# Patient Record
Sex: MALE | Race: BLACK OR AFRICAN AMERICAN | NOT HISPANIC OR LATINO | Employment: OTHER | URBAN - METROPOLITAN AREA
[De-identification: names, ages, dates, MRNs, and addresses within clinical notes are randomized per-mention and may not be internally consistent; named-entity substitution may affect disease eponyms.]

---

## 2018-12-13 ENCOUNTER — APPOINTMENT (OUTPATIENT)
Dept: LAB | Facility: HOSPITAL | Age: 68
End: 2018-12-13
Payer: COMMERCIAL

## 2018-12-13 ENCOUNTER — TELEPHONE (OUTPATIENT)
Dept: INTERNAL MEDICINE CLINIC | Facility: CLINIC | Age: 68
End: 2018-12-13

## 2018-12-13 ENCOUNTER — OFFICE VISIT (OUTPATIENT)
Dept: INTERNAL MEDICINE CLINIC | Facility: CLINIC | Age: 68
End: 2018-12-13
Payer: COMMERCIAL

## 2018-12-13 VITALS
TEMPERATURE: 97.7 F | HEIGHT: 65 IN | SYSTOLIC BLOOD PRESSURE: 130 MMHG | DIASTOLIC BLOOD PRESSURE: 100 MMHG | BODY MASS INDEX: 30.19 KG/M2 | WEIGHT: 181.22 LBS | HEART RATE: 80 BPM

## 2018-12-13 DIAGNOSIS — Z12.5 PROSTATE CANCER SCREENING: ICD-10-CM

## 2018-12-13 DIAGNOSIS — R03.0 ELEVATED BLOOD PRESSURE READING IN OFFICE WITHOUT DIAGNOSIS OF HYPERTENSION: ICD-10-CM

## 2018-12-13 DIAGNOSIS — R10.11 RIGHT UPPER QUADRANT PAIN: Primary | ICD-10-CM

## 2018-12-13 DIAGNOSIS — R73.01 ELEVATED FASTING GLUCOSE: Primary | ICD-10-CM

## 2018-12-13 DIAGNOSIS — E78.5 BORDERLINE HYPERLIPIDEMIA: ICD-10-CM

## 2018-12-13 DIAGNOSIS — E66.09 CLASS 1 OBESITY DUE TO EXCESS CALORIES WITHOUT SERIOUS COMORBIDITY WITH BODY MASS INDEX (BMI) OF 30.0 TO 30.9 IN ADULT: ICD-10-CM

## 2018-12-13 PROBLEM — E66.811 CLASS 1 OBESITY DUE TO EXCESS CALORIES WITHOUT SERIOUS COMORBIDITY WITH BODY MASS INDEX (BMI) OF 30.0 TO 30.9 IN ADULT: Status: ACTIVE | Noted: 2018-12-13

## 2018-12-13 LAB
ALBUMIN SERPL BCP-MCNC: 4 G/DL (ref 3.5–5)
ALP SERPL-CCNC: 50 U/L (ref 46–116)
ALT SERPL W P-5'-P-CCNC: 26 U/L (ref 12–78)
ANION GAP SERPL CALCULATED.3IONS-SCNC: 5 MMOL/L (ref 4–13)
AST SERPL W P-5'-P-CCNC: 19 U/L (ref 5–45)
BILIRUB SERPL-MCNC: 0.73 MG/DL (ref 0.2–1)
BUN SERPL-MCNC: 12 MG/DL (ref 5–25)
CALCIUM SERPL-MCNC: 9.8 MG/DL (ref 8.3–10.1)
CHLORIDE SERPL-SCNC: 104 MMOL/L (ref 100–108)
CHOLEST SERPL-MCNC: 196 MG/DL (ref 50–200)
CO2 SERPL-SCNC: 29 MMOL/L (ref 21–32)
CREAT SERPL-MCNC: 1.12 MG/DL (ref 0.6–1.3)
GFR SERPL CREATININE-BSD FRML MDRD: 79 ML/MIN/1.73SQ M
GLUCOSE P FAST SERPL-MCNC: 105 MG/DL (ref 65–99)
HDLC SERPL-MCNC: 40 MG/DL (ref 40–60)
LDLC SERPL CALC-MCNC: 126 MG/DL (ref 0–100)
POTASSIUM SERPL-SCNC: 4.4 MMOL/L (ref 3.5–5.3)
PROT SERPL-MCNC: 8.5 G/DL (ref 6.4–8.2)
PSA SERPL-MCNC: 2.2 NG/ML (ref 0–4)
SODIUM SERPL-SCNC: 138 MMOL/L (ref 136–145)
TRIGL SERPL-MCNC: 149 MG/DL

## 2018-12-13 PROCEDURE — 80053 COMPREHEN METABOLIC PANEL: CPT | Performed by: PHYSICIAN ASSISTANT

## 2018-12-13 PROCEDURE — 99204 OFFICE O/P NEW MOD 45 MIN: CPT | Performed by: PHYSICIAN ASSISTANT

## 2018-12-13 PROCEDURE — 36415 COLL VENOUS BLD VENIPUNCTURE: CPT | Performed by: PHYSICIAN ASSISTANT

## 2018-12-13 PROCEDURE — 80061 LIPID PANEL: CPT | Performed by: PHYSICIAN ASSISTANT

## 2018-12-13 PROCEDURE — G0103 PSA SCREENING: HCPCS

## 2018-12-13 NOTE — PATIENT INSTRUCTIONS
Get the labs completed  Sched the ultrasound for pain Right side  As we discussed today there is a possibility that this recurrent pain could be related to gallbladder issues  The ultrasound will determine if this is a concern  As discussed limit all caffeine and salty foods and Blood pressure slightly high in the office today  Will re evaluate when return for testing results    As discussed you have declined all vaccines  Heart Healthy Diet   AMBULATORY CARE:   A heart healthy diet  is an eating plan low in total fat, unhealthy fats, and sodium (salt)  A heart healthy diet helps decrease your risk for heart disease and stroke  Limit the amount of fat you eat to 25% to 35% of your total daily calories  Limit sodium to less than 2,300 mg each day  Healthy fats:  Healthy fats can help improve cholesterol levels  The risk for heart disease is decreased when cholesterol levels are normal  Choose healthy fats, such as the following:  · Unsaturated fat  is found in foods such as soybean, canola, olive, corn, and safflower oils  It is also found in soft tub margarine that is made with liquid vegetable oil  · Omega-3 fat  is found in certain fish, such as salmon, tuna, and trout, and in walnuts and flaxseed  Unhealthy fats:  Unhealthy fats can cause unhealthy cholesterol levels in your blood and increase your risk of heart disease  Limit unhealthy fats, such as the following:  · Cholesterol  is found in animal foods, such as eggs and lobster, and in dairy products made from whole milk  Limit cholesterol to less than 300 milligrams (mg) each day  You may need to limit cholesterol to 200 mg each day if you have heart disease  · Saturated fat  is found in meats, such as becerril and hamburger  It is also found in chicken or turkey skin, whole milk, and butter  Limit saturated fat to less than 7% of your total daily calories   Limit saturated fat to less than 6% if you have heart disease or are at increased risk for it  · Trans fat  is found in packaged foods, such as potato chips and cookies  It is also in hard margarine, some fried foods, and shortening  Avoid trans fats as much as possible    Heart healthy foods and drinks to include:  Ask your dietitian or healthcare provider how many servings to have from each of the following food groups:  · Grains:      ¨ Whole-wheat breads, cereals, and pastas, and brown rice    ¨ Low-fat, low-sodium crackers and chips    · Vegetables:      ¨ Broccoli, green beans, green peas, and spinach    ¨ Collards, kale, and lima beans    ¨ Carrots, sweet potatoes, tomatoes, and peppers    ¨ Canned vegetables with no salt added    · Fruits:      ¨ Bananas, peaches, pears, and pineapple    ¨ Grapes, raisins, and dates    ¨ Oranges, tangerines, grapefruit, orange juice, and grapefruit juice    ¨ Apricots, mangoes, melons, and papaya    ¨ Raspberries and strawberries    ¨ Canned fruit with no added sugar    · Low-fat dairy products:      ¨ Nonfat (skim) milk, 1% milk, and low-fat almond, cashew, or soy milks fortified with calcium    ¨ Low-fat cheese, regular or frozen yogurt, and cottage cheese    · Meats and proteins , such as lean cuts of beef and pork (loin, leg, round), skinless chicken and turkey, legumes, soy products, egg whites, and nuts  Foods and drinks to limit or avoid:  Ask your dietitian or healthcare provider about these and other foods that are high in unhealthy fat, sodium, and sugar:  · Snack or packaged foods , such as frozen dinners, cookies, macaroni and cheese, and cereals with more than 300 mg of sodium per serving    · Canned or dry mixes  for cakes, soups, sauces, or gravies    · Vegetables with added sodium , such as instant potatoes, vegetables with added sauces, or regular canned vegetables    · Other foods high in sodium , such as ketchup, barbecue sauce, salad dressing, pickles, olives, soy sauce, and miso    · High-fat dairy foods  such as whole or 2% milk, cream cheese, or sour cream, and cheeses     · High-fat protein foods  such as high-fat cuts of beef (T-bone steaks, ribs), chicken or turkey with skin, and organ meats, such as liver    · Cured or smoked meats , such as hot dogs, becerril, and sausage    · Unhealthy fats and oils , such as butter, stick margarine, shortening, and cooking oils such as coconut or palm oil    · Food and drinks high in sugar , such as soft drinks (soda), sports drinks, sweetened tea, candy, cake, cookies, pies, and doughnuts  Other diet guidelines to follow:   · Eat more foods containing omega-3 fats  Eat fish high in omega-3 fats at least 2 times a week  · Limit alcohol  Too much alcohol can damage your heart and raise your blood pressure  Women should limit alcohol to 1 drink a day  Men should limit alcohol to 2 drinks a day  A drink of alcohol is 12 ounces of beer, 5 ounces of wine, or 1½ ounces of liquor  · Choose low-sodium foods  High-sodium foods can lead to high blood pressure  Add little or no salt to food you prepare  Use herbs and spices in place of salt  · Eat more fiber  to help lower cholesterol levels  Eat at least 5 servings of fruits and vegetables each day  Eat 3 ounces of whole-grain foods each day  Legumes (beans) are also a good source of fiber  Lifestyle guidelines:   · Do not smoke  Nicotine and other chemicals in cigarettes and cigars can cause lung and heart damage  Ask your healthcare provider for information if you currently smoke and need help to quit  E-cigarettes or smokeless tobacco still contain nicotine  Talk to your healthcare provider before you use these products  · Exercise regularly  to help you maintain a healthy weight and improve your blood pressure and cholesterol levels  Ask your healthcare provider about the best exercise plan for you  Do not start an exercise program without asking your healthcare provider     Follow up with your healthcare provider as directed:  Write down your questions so you remember to ask them during your visits  © 2017 [employer] Information is for End User's use only and may not be sold, redistributed or otherwise used for commercial purposes  All illustrations and images included in CareNotes® are the copyrighted property of [employer]  or Nir [employer]  The above information is an  only  It is not intended as medical advice for individual conditions or treatments  Talk to your doctor, nurse or pharmacist before following any medical regimen to see if it is safe and effective for you

## 2018-12-13 NOTE — PROGRESS NOTES
Assessment/Plan:    No problem-specific Assessment & Plan notes found for this encounter  Diagnoses and all orders for this visit:    Right upper quadrant pain  -     US right upper quadrant; Future    Elevated blood pressure reading in office without diagnosis of hypertension    Class 1 obesity due to excess calories without serious comorbidity with body mass index (BMI) of 30 0 to 30 9 in adult  -     Comprehensive metabolic panel  -     Lipid Panel with Direct LDL reflex    Borderline hyperlipidemia  -     Comprehensive metabolic panel  -     Lipid Panel with Direct LDL reflex    Prostate cancer screening  -     PSA, Total Screen; Future          Subjective:      Patient ID: Israel Kwan is a 79 y o  male  Pt here as new patient  Reports no medical care in about 14 years  Last was for muscle pain from lifting heavy boxes thinks  was 2003  Pt denies any prior / known chronic medical conditions    Positive FH 2 siblings with DM    Never a smoker, no drug or alcohol  Never had colonoscopy   No FH colon or prostate cancer    C/O RUQ pain on off past 3 months will get about 3x a week  Will last about 5 minutes  Then goes away  Denies any N/V  No constipation or diarrhea    States sometime if stretches helps the pain  Has not noticed if associated with eating or not eating  Describes as cramp not stabbing  Sometimes arthritic pain to fingers knees has always done physical work and played sports not limiting ADL    Discussed slightly elevated diastolic blood pressure reading  Patient states he has checked his blood pressure in the stores and he reports the 1st number ranges 120s to 130s and states that his bottom number is typically 80s  He denies ever having any readings above 90    Patient declines all vaccines  When asked why he states that he rarely gets sick and if he does he gets well very quickly    Discussed that while that is very good for him we want to make sure that we prevent any chronic conditions or complications however patient declines vaccines  The following portions of the patient's history were reviewed and updated as appropriate: allergies, current medications, past family history, past medical history, past social history, past surgical history and problem list     Review of Systems   Constitutional: Negative  Negative for chills, fatigue and unexpected weight change  HENT: Negative  No dental care in decades, denies any pain   Eyes: Negative  Reading glasses last eye exam never   Respiratory: Negative  Negative for cough, chest tightness and shortness of breath  Cardiovascular: Negative  Negative for chest pain and palpitations  Gastrointestinal: Positive for abdominal pain  Negative for abdominal distention, constipation, diarrhea, nausea and vomiting  Patient reporting pain RUQ on/ off past 3 months   Endocrine: Negative for cold intolerance and heat intolerance  Genitourinary: Negative  Negative for dysuria, frequency and urgency  No nocturia   Musculoskeletal: Positive for arthralgias  Some generalized arthritis  Denies trauma or injury   Skin: Negative  Negative for rash  Allergic/Immunologic: Negative  Neurological: Negative  Negative for dizziness, seizures and weakness  Psychiatric/Behavioral: Negative  Objective:      /100 (BP Location: Left arm, Patient Position: Sitting, Cuff Size: Standard)   Pulse 80   Temp 97 7 °F (36 5 °C) (Oral)   Ht 5' 4 5" (1 638 m)   Wt 82 2 kg (181 lb 3 5 oz)   BMI 30 63 kg/m²          Physical Exam   Constitutional: He is oriented to person, place, and time  He appears well-developed and well-nourished  HENT:   Head: Normocephalic and atraumatic  Fair dentition   Eyes: Pupils are equal, round, and reactive to light  Conjunctivae are normal    Neck: Normal range of motion  Neck supple  Carotid bruit is not present  No thyromegaly present     Cardiovascular: Normal rate and regular rhythm  No murmur heard  Pulmonary/Chest: Effort normal and breath sounds normal  He has no wheezes  Abdominal: Soft  Bowel sounds are normal  He exhibits no distension  There is no tenderness  There is no rigidity, no rebound, no guarding and negative Weathers's sign  No hernia  Diastasis recti   Musculoskeletal: Normal range of motion  He exhibits no edema or tenderness  Neurological: He is alert and oriented to person, place, and time  Skin: Skin is warm and dry  No rash noted  Generalized dry skin   Psychiatric: He has a normal mood and affect  His behavior is normal      BMI Counseling: Body mass index is 30 63 kg/m²  Discussed the patient's BMI with him  The BMI is above average  BMI counseling and education was provided to the patient  Nutrition recommendations include decreasing overall calorie intake, 3-5 servings of fruits/vegetables daily, reducing fast food intake, decreasing soda and/or juice intake, moderation in carbohydrate intake, increasing intake of lean protein and reducing intake of cholesterol

## 2018-12-19 ENCOUNTER — HOSPITAL ENCOUNTER (OUTPATIENT)
Dept: RADIOLOGY | Facility: HOSPITAL | Age: 68
Discharge: HOME | End: 2018-12-19
Payer: COMMERCIAL

## 2018-12-19 DIAGNOSIS — R10.11 RIGHT UPPER QUADRANT PAIN: ICD-10-CM

## 2018-12-19 PROCEDURE — 76705 ECHO EXAM OF ABDOMEN: CPT

## 2019-01-15 NOTE — PROGRESS NOTES
OVERDUE RESULTS REMINDER  Left message for patient reminding him to get labs done ordered in December by Alvaro Kaminski and to call if has questions       HEMOGLOBIN A1C; GLUCOSE, FASTING

## 2019-04-16 ENCOUNTER — TELEPHONE (OUTPATIENT)
Dept: INTERNAL MEDICINE CLINIC | Facility: CLINIC | Age: 69
End: 2019-04-16

## 2019-04-16 ENCOUNTER — APPOINTMENT (OUTPATIENT)
Dept: LAB | Facility: CLINIC | Age: 69
End: 2019-04-16
Payer: COMMERCIAL

## 2019-04-16 DIAGNOSIS — R73.01 ELEVATED FASTING GLUCOSE: ICD-10-CM

## 2019-04-16 LAB
EST. AVERAGE GLUCOSE BLD GHB EST-MCNC: 177 MG/DL
GLUCOSE P FAST SERPL-MCNC: 110 MG/DL (ref 65–99)
HBA1C MFR BLD: 7.8 % (ref 4.2–6.3)

## 2019-04-16 PROCEDURE — 82947 ASSAY GLUCOSE BLOOD QUANT: CPT

## 2019-04-16 PROCEDURE — 36415 COLL VENOUS BLD VENIPUNCTURE: CPT

## 2019-04-16 PROCEDURE — 83036 HEMOGLOBIN GLYCOSYLATED A1C: CPT

## 2019-04-17 ENCOUNTER — OFFICE VISIT (OUTPATIENT)
Dept: INTERNAL MEDICINE CLINIC | Facility: CLINIC | Age: 69
End: 2019-04-17

## 2019-04-17 ENCOUNTER — OFFICE VISIT (OUTPATIENT)
Dept: LAB | Facility: HOSPITAL | Age: 69
End: 2019-04-17
Payer: COMMERCIAL

## 2019-04-17 ENCOUNTER — APPOINTMENT (OUTPATIENT)
Dept: LAB | Facility: HOSPITAL | Age: 69
End: 2019-04-17
Payer: COMMERCIAL

## 2019-04-17 VITALS
HEART RATE: 60 BPM | TEMPERATURE: 97.3 F | WEIGHT: 181.22 LBS | SYSTOLIC BLOOD PRESSURE: 146 MMHG | HEIGHT: 64 IN | BODY MASS INDEX: 30.94 KG/M2 | DIASTOLIC BLOOD PRESSURE: 84 MMHG

## 2019-04-17 DIAGNOSIS — Z12.11 SCREENING FOR COLON CANCER: ICD-10-CM

## 2019-04-17 DIAGNOSIS — I10 ESSENTIAL HYPERTENSION: Chronic | ICD-10-CM

## 2019-04-17 DIAGNOSIS — K76.0 HEPATIC STEATOSIS: Chronic | ICD-10-CM

## 2019-04-17 DIAGNOSIS — E66.09 CLASS 1 OBESITY DUE TO EXCESS CALORIES WITHOUT SERIOUS COMORBIDITY WITH BODY MASS INDEX (BMI) OF 30.0 TO 30.9 IN ADULT: ICD-10-CM

## 2019-04-17 DIAGNOSIS — E78.00 ELEVATED LDL CHOLESTEROL LEVEL: Chronic | ICD-10-CM

## 2019-04-17 DIAGNOSIS — E11.65 UNCONTROLLED TYPE 2 DIABETES MELLITUS WITH HYPERGLYCEMIA (HCC): Primary | Chronic | ICD-10-CM

## 2019-04-17 PROBLEM — R10.11 RIGHT UPPER QUADRANT PAIN: Status: RESOLVED | Noted: 2018-12-13 | Resolved: 2019-04-17

## 2019-04-17 PROBLEM — E78.5 BORDERLINE HYPERLIPIDEMIA: Status: RESOLVED | Noted: 2018-12-13 | Resolved: 2019-04-17

## 2019-04-17 PROBLEM — R03.0 ELEVATED BLOOD PRESSURE READING IN OFFICE WITHOUT DIAGNOSIS OF HYPERTENSION: Status: RESOLVED | Noted: 2018-12-13 | Resolved: 2019-04-17

## 2019-04-17 PROBLEM — R73.01 ELEVATED FASTING GLUCOSE: Status: RESOLVED | Noted: 2018-12-13 | Resolved: 2019-04-17

## 2019-04-17 LAB — HEMOCCULT STL QL IA: NEGATIVE

## 2019-04-17 PROCEDURE — 93005 ELECTROCARDIOGRAM TRACING: CPT

## 2019-04-17 PROCEDURE — G0328 FECAL BLOOD SCRN IMMUNOASSAY: HCPCS

## 2019-04-17 PROCEDURE — 1101F PT FALLS ASSESS-DOCD LE1/YR: CPT | Performed by: INTERNAL MEDICINE

## 2019-04-17 PROCEDURE — 99213 OFFICE O/P EST LOW 20 MIN: CPT | Performed by: INTERNAL MEDICINE

## 2019-04-17 RX ORDER — AMLODIPINE BESYLATE 5 MG/1
5 TABLET ORAL DAILY
Qty: 90 TABLET | Refills: 1 | Status: SHIPPED | OUTPATIENT
Start: 2019-04-17 | End: 2019-10-12 | Stop reason: SDUPTHER

## 2019-04-18 ENCOUNTER — TELEPHONE (OUTPATIENT)
Dept: INTERNAL MEDICINE CLINIC | Facility: CLINIC | Age: 69
End: 2019-04-18

## 2019-04-18 DIAGNOSIS — I44.0 FIRST DEGREE AV BLOCK: Primary | Chronic | ICD-10-CM

## 2019-04-18 LAB
ATRIAL RATE: 54 BPM
P AXIS: 36 DEGREES
PR INTERVAL: 276 MS
QRS AXIS: -51 DEGREES
QRSD INTERVAL: 120 MS
QT INTERVAL: 430 MS
QTC INTERVAL: 407 MS
T WAVE AXIS: 44 DEGREES
VENTRICULAR RATE: 54 BPM

## 2019-04-18 PROCEDURE — 93010 ELECTROCARDIOGRAM REPORT: CPT | Performed by: INTERNAL MEDICINE

## 2019-05-07 ENCOUNTER — CONSULT (OUTPATIENT)
Dept: CARDIOLOGY CLINIC | Facility: CLINIC | Age: 69
End: 2019-05-07
Payer: COMMERCIAL

## 2019-05-07 VITALS
BODY MASS INDEX: 30.88 KG/M2 | HEIGHT: 64 IN | SYSTOLIC BLOOD PRESSURE: 134 MMHG | HEART RATE: 68 BPM | DIASTOLIC BLOOD PRESSURE: 88 MMHG | WEIGHT: 180.9 LBS

## 2019-05-07 DIAGNOSIS — R94.31 ABNORMAL EKG: ICD-10-CM

## 2019-05-07 DIAGNOSIS — E78.00 ELEVATED LDL CHOLESTEROL LEVEL: Chronic | ICD-10-CM

## 2019-05-07 DIAGNOSIS — I10 ESSENTIAL HYPERTENSION: Chronic | ICD-10-CM

## 2019-05-07 DIAGNOSIS — I44.0 FIRST DEGREE AV BLOCK: Primary | Chronic | ICD-10-CM

## 2019-05-07 PROCEDURE — 99243 OFF/OP CNSLTJ NEW/EST LOW 30: CPT | Performed by: INTERNAL MEDICINE

## 2019-06-07 ENCOUNTER — HOSPITAL ENCOUNTER (OUTPATIENT)
Dept: NON INVASIVE DIAGNOSTICS | Facility: CLINIC | Age: 69
Discharge: HOME | End: 2019-06-07
Payer: COMMERCIAL

## 2019-06-07 DIAGNOSIS — R94.31 ABNORMAL EKG: ICD-10-CM

## 2019-06-07 DIAGNOSIS — I44.0 FIRST DEGREE AV BLOCK: ICD-10-CM

## 2019-06-07 DIAGNOSIS — I44.0 FIRST DEGREE AV BLOCK: Chronic | ICD-10-CM

## 2019-06-07 LAB
CHEST PAIN STATEMENT: NORMAL
MAX DIASTOLIC BP: 78 MMHG
MAX HEART RATE: 166 BPM
MAX PREDICTED HEART RATE: 153 BPM
MAX. SYSTOLIC BP: 202 MMHG
PROTOCOL NAME: NORMAL
TARGET HR FORMULA: NORMAL
TEST INDICATION: NORMAL
TIME IN EXERCISE PHASE: NORMAL

## 2019-06-07 PROCEDURE — 93225 XTRNL ECG REC<48 HRS REC: CPT

## 2019-06-07 PROCEDURE — 93016 CV STRESS TEST SUPVJ ONLY: CPT | Performed by: INTERNAL MEDICINE

## 2019-06-07 PROCEDURE — 93018 CV STRESS TEST I&R ONLY: CPT | Performed by: INTERNAL MEDICINE

## 2019-06-07 PROCEDURE — 93306 TTE W/DOPPLER COMPLETE: CPT

## 2019-06-07 PROCEDURE — 93306 TTE W/DOPPLER COMPLETE: CPT | Performed by: INTERNAL MEDICINE

## 2019-06-07 PROCEDURE — 93017 CV STRESS TEST TRACING ONLY: CPT

## 2019-06-07 PROCEDURE — 93226 XTRNL ECG REC<48 HR SCAN A/R: CPT

## 2019-06-11 PROCEDURE — 93227 XTRNL ECG REC<48 HR R&I: CPT | Performed by: INTERNAL MEDICINE

## 2019-07-22 ENCOUNTER — OFFICE VISIT (OUTPATIENT)
Dept: INTERNAL MEDICINE CLINIC | Facility: CLINIC | Age: 69
End: 2019-07-22

## 2019-07-22 VITALS
SYSTOLIC BLOOD PRESSURE: 154 MMHG | BODY MASS INDEX: 29.36 KG/M2 | DIASTOLIC BLOOD PRESSURE: 86 MMHG | HEART RATE: 70 BPM | WEIGHT: 171.96 LBS | TEMPERATURE: 98.2 F | HEIGHT: 64 IN

## 2019-07-22 DIAGNOSIS — I44.0 FIRST DEGREE AV BLOCK: Chronic | ICD-10-CM

## 2019-07-22 DIAGNOSIS — B35.3 TINEA PEDIS OF BOTH FEET: Chronic | ICD-10-CM

## 2019-07-22 DIAGNOSIS — Z12.11 SCREENING FOR COLON CANCER: ICD-10-CM

## 2019-07-22 DIAGNOSIS — E11.65 UNCONTROLLED TYPE 2 DIABETES MELLITUS WITH HYPERGLYCEMIA (HCC): Primary | Chronic | ICD-10-CM

## 2019-07-22 DIAGNOSIS — I10 ESSENTIAL HYPERTENSION: Chronic | ICD-10-CM

## 2019-07-22 PROBLEM — R94.31 ABNORMAL EKG: Status: RESOLVED | Noted: 2019-05-07 | Resolved: 2019-07-22

## 2019-07-22 PROCEDURE — 99213 OFFICE O/P EST LOW 20 MIN: CPT | Performed by: PHYSICIAN ASSISTANT

## 2019-07-22 RX ORDER — KETOCONAZOLE 20 MG/G
CREAM TOPICAL DAILY
Qty: 15 G | Refills: 2 | Status: SHIPPED | OUTPATIENT
Start: 2019-07-22 | End: 2020-06-08 | Stop reason: SDUPTHER

## 2019-07-22 NOTE — PROGRESS NOTES
Assessment/Plan:    No problem-specific Assessment & Plan notes found for this encounter  Diagnoses and all orders for this visit:    Uncontrolled type 2 diabetes mellitus with hyperglycemia ([address] )  -     Ambulatory referral to Ophthalmology; Future    Essential hypertension    Tinea pedis of both feet  -     ketoconazole (NIZORAL) 2 % cream; Apply topically daily    Screening for colon cancer  -     Ambulatory referral to Gastroenterology; Future    First degree AV block          Subjective:      Patient ID: Matt Xavier is a 81 y o  male  Pt here for follow up   States did not get his labs completed    States will get them completed    States has been watching his diet  And has lost 9 Lbs to date  Admits had been drinking a lot of sugary drinks    States has been checking his BP at the pharmacy but left the log at home  States has been in the 120's  On recheck after sitting for a number of minutes cooling down blood pressure reading 144/86    Did see cardio for heart block completed  ECHO, stress and Holter, a few PVC but neg for ischemia and EF 60%  Patient initially reported to me that he was told his blood pressure was normal during his cardiac testing however I did reviewed with him that at the stress test before the test started and after recovery his blood pressure was elevated however not unexpectedly as people are often nervous during this type of test for the heart  Patient is aware that the stress test was normal     Patient now reports that he will be leaving for [place] in 2-3 weeks and will not be returning until sometime in November  He states he has the opportunity to work as a   Patient states he does not do any heavy lifting  Patient continues to be resistant to recommendations for treatment  Advised patient that he needs to bring in his blood pressure log so we can review this in the office  As noted patient declined all diabetic medications with his A1c being 7 8%  Patient feels he has made a lot changes that his sugar should be good  The following portions of the patient's history were reviewed and updated as appropriate: allergies, current medications, past family history, past medical history, past social history, past surgical history and problem list     Review of Systems   Constitutional: Negative  Negative for diaphoresis, fever and unexpected weight change  HENT: Negative  Respiratory: Negative  Negative for cough and shortness of breath  Cardiovascular: Negative  Negative for chest pain, palpitations and leg swelling  Gastrointestinal: Negative  Endocrine: Negative  Negative for polydipsia, polyphagia and polyuria  Genitourinary: Negative  Musculoskeletal: Negative  Negative for back pain  Skin: Positive for rash  Patient initially denied itching in his feet however on examination he does have tinea pedis between his toes and patient then does admit he does get some itching  Patient reports he has had this on and off throughout his life as he used to play a lot of sports  Neurological: Negative  Negative for dizziness, light-headedness, numbness and headaches  Patient denies any numbness and tingling to hands or feet   Psychiatric/Behavioral: Negative  Objective:      /86   Pulse 70   Temp 98 2 °F (36 8 °C)   Ht 5' 4" (1 626 m)   Wt 78 kg (171 lb 15 3 oz)   BMI 29 52 kg/m²          Physical Exam   Constitutional: He appears well-developed and well-nourished  HENT:   Head: Normocephalic and atraumatic  Eyes: Pupils are equal, round, and reactive to light  Conjunctivae are normal    Cardiovascular: Normal rate, regular rhythm and normal heart sounds  Pulses are no weak pulses  Pulses:       Dorsalis pedis pulses are 2+ on the right side, and 2+ on the left side  Pulmonary/Chest: Effort normal and breath sounds normal  He has no wheezes  Abdominal: Soft   Bowel sounds are normal  Musculoskeletal: He exhibits deformity (Pes planus bilateral)  He exhibits no edema  Feet:   Right Foot:   Skin Integrity: Positive for skin breakdown and dry skin  Negative for ulcer, erythema, warmth or callus  Left Foot:   Skin Integrity: Positive for skin breakdown and dry skin  Negative for ulcer, erythema, warmth or callus  Neurological: He displays normal reflexes  No sensory deficit  He exhibits normal muscle tone  Skin: Skin is warm and dry  Rash noted  Psychiatric: He has a normal mood and affect  His behavior is normal    Nursing note and vitals reviewed  Patient's shoes and socks removed  Right Foot/Ankle   Right Foot Inspection  Skin Exam: skin normal, skin intact, dry skin and maceration no warmth, no callus, no erythema, no abnormal color, no pre-ulcer, no ulcer and no callus                          Toe Exam: ROM and strength within normal limits  Sensory   Vibration: intact    Monofilament testing: intact  Vascular    The right DP pulse is 2+  Left Foot/Ankle  Left Foot Inspection  Skin Exam: skin normal, skin intact, dry skin and macerationno warmth, no erythema, normal color, no pre-ulcer, no ulcer and no callus                         Toe Exam: ROM and strength within normal limits                   Sensory   Vibration: intact    Monofilament: intact  Vascular    The left DP pulse is 2+  Assign Risk Category:  No deformity present; No loss of protective sensation;  No weak pulses       Risk: 0

## 2019-07-22 NOTE — PATIENT INSTRUCTIONS
As per our discussion you will get the labs completed before you leave the state and we will call you with those results  Did review that your blood pressure continues to be elevated on medical office visits but you report your blood pressure  log show normal readings in the 120s you will bring this to the office  Diabetic foot exam completed today  Diabetic eye exam referral placed and aware that you will not have an appointment scheduled until after you return in November  As discussed I have sent a cream to the pharmacy for the fungal infection between her toes  You may apply small amount once or twice daily as needed until rash in itching resolved  Reviewed importance of good foot care and to make sure you dry between your toes after bathing    Also bring an extra para socks so if your feet get hot or sweaty you have a dry pair of socks to mariscal into

## 2019-07-26 ENCOUNTER — APPOINTMENT (OUTPATIENT)
Dept: LAB | Facility: CLINIC | Age: 69
End: 2019-07-26
Payer: COMMERCIAL

## 2019-07-26 ENCOUNTER — TELEPHONE (OUTPATIENT)
Dept: INTERNAL MEDICINE CLINIC | Facility: CLINIC | Age: 69
End: 2019-07-26

## 2019-07-26 DIAGNOSIS — E78.00 ELEVATED LDL CHOLESTEROL LEVEL: Chronic | ICD-10-CM

## 2019-07-26 DIAGNOSIS — E11.65 UNCONTROLLED TYPE 2 DIABETES MELLITUS WITH HYPERGLYCEMIA (HCC): Chronic | ICD-10-CM

## 2019-07-26 DIAGNOSIS — I10 ESSENTIAL HYPERTENSION: Chronic | ICD-10-CM

## 2019-07-26 DIAGNOSIS — E11.9 TYPE 2 DIABETES, DIET CONTROLLED (HCC): Primary | Chronic | ICD-10-CM

## 2019-07-26 LAB
ALBUMIN SERPL BCP-MCNC: 4 G/DL (ref 3.5–5)
ALP SERPL-CCNC: 53 U/L (ref 46–116)
ALT SERPL W P-5'-P-CCNC: 17 U/L (ref 12–78)
ANION GAP SERPL CALCULATED.3IONS-SCNC: 3 MMOL/L (ref 4–13)
AST SERPL W P-5'-P-CCNC: 16 U/L (ref 5–45)
BILIRUB SERPL-MCNC: 0.7 MG/DL (ref 0.2–1)
BUN SERPL-MCNC: 14 MG/DL (ref 5–25)
CALCIUM SERPL-MCNC: 9.3 MG/DL (ref 8.3–10.1)
CHLORIDE SERPL-SCNC: 109 MMOL/L (ref 100–108)
CHOLEST SERPL-MCNC: 175 MG/DL (ref 50–200)
CO2 SERPL-SCNC: 32 MMOL/L (ref 21–32)
CREAT SERPL-MCNC: 1.02 MG/DL (ref 0.6–1.3)
CREAT UR-MCNC: 171 MG/DL
EST. AVERAGE GLUCOSE BLD GHB EST-MCNC: 131 MG/DL
GFR SERPL CREATININE-BSD FRML MDRD: 88 ML/MIN/1.73SQ M
GLUCOSE P FAST SERPL-MCNC: 87 MG/DL (ref 65–99)
HBA1C MFR BLD: 6.2 % (ref 4.2–6.3)
HDLC SERPL-MCNC: 61 MG/DL (ref 40–60)
LDLC SERPL CALC-MCNC: 99 MG/DL (ref 0–100)
MICROALBUMIN UR-MCNC: 7.2 MG/L (ref 0–20)
MICROALBUMIN/CREAT 24H UR: 4 MG/G CREATININE (ref 0–30)
POTASSIUM SERPL-SCNC: 4 MMOL/L (ref 3.5–5.3)
PROT SERPL-MCNC: 7.9 G/DL (ref 6.4–8.2)
SODIUM SERPL-SCNC: 144 MMOL/L (ref 136–145)
TRIGL SERPL-MCNC: 77 MG/DL

## 2019-07-26 PROCEDURE — 36415 COLL VENOUS BLD VENIPUNCTURE: CPT

## 2019-07-26 PROCEDURE — 82570 ASSAY OF URINE CREATININE: CPT

## 2019-07-26 PROCEDURE — 80053 COMPREHEN METABOLIC PANEL: CPT

## 2019-07-26 PROCEDURE — 3061F NEG MICROALBUMINURIA REV: CPT | Performed by: PHYSICIAN ASSISTANT

## 2019-07-26 PROCEDURE — 82043 UR ALBUMIN QUANTITATIVE: CPT

## 2019-07-26 PROCEDURE — 83036 HEMOGLOBIN GLYCOSYLATED A1C: CPT

## 2019-07-26 PROCEDURE — 80061 LIPID PANEL: CPT

## 2019-07-26 NOTE — TELEPHONE ENCOUNTER
Patient brought a blood pressure reading log from 6/1/19-7/23/19  Please review        Copy will be scanned under this encounter

## 2019-08-20 ENCOUNTER — TELEPHONE (OUTPATIENT)
Dept: INTERNAL MEDICINE CLINIC | Facility: CLINIC | Age: 69
End: 2019-08-20

## 2019-08-20 NOTE — TELEPHONE ENCOUNTER
Patient has an appointment coming up on 9/9/19 with Dr Jose Carlos Joya and needs insurance referral

## 2019-08-30 NOTE — TELEPHONE ENCOUNTER
[facility] ISSUED INS REF FOR DOS 09/09 AND FAXED TO SPECIALIST     I WILL BE FOLLOWING UP ON THIS TO MAKE SURE WE RECEIVE RECORDS FOR THIS VISIT

## 2019-10-12 DIAGNOSIS — I10 ESSENTIAL HYPERTENSION: Chronic | ICD-10-CM

## 2019-10-14 RX ORDER — AMLODIPINE BESYLATE 5 MG/1
5 TABLET ORAL DAILY
Qty: 90 TABLET | Refills: 0 | Status: SHIPPED | OUTPATIENT
Start: 2019-10-14 | End: 2020-01-09

## 2019-11-19 LAB
LEFT EYE DIABETIC RETINOPATHY: NORMAL
RIGHT EYE DIABETIC RETINOPATHY: NORMAL

## 2019-11-20 ENCOUNTER — OFFICE VISIT (OUTPATIENT)
Dept: GASTROENTEROLOGY | Facility: CLINIC | Age: 69
End: 2019-11-20
Payer: COMMERCIAL

## 2019-11-20 VITALS
WEIGHT: 176 LBS | HEART RATE: 53 BPM | BODY MASS INDEX: 29.32 KG/M2 | HEIGHT: 65 IN | TEMPERATURE: 97.6 F | SYSTOLIC BLOOD PRESSURE: 160 MMHG | DIASTOLIC BLOOD PRESSURE: 93 MMHG

## 2019-11-20 DIAGNOSIS — Z12.11 SCREENING FOR COLON CANCER: ICD-10-CM

## 2019-11-20 DIAGNOSIS — Z11.59 NEED FOR HEPATITIS C SCREENING TEST: ICD-10-CM

## 2019-11-20 DIAGNOSIS — Z12.11 COLON CANCER SCREENING: ICD-10-CM

## 2019-11-20 DIAGNOSIS — Z11.59 NEED FOR HEPATITIS B SCREENING TEST: Primary | ICD-10-CM

## 2019-11-20 PROCEDURE — 99243 OFF/OP CNSLTJ NEW/EST LOW 30: CPT | Performed by: INTERNAL MEDICINE

## 2019-11-20 NOTE — PROGRESS NOTES
[facility] - Outpatient Consultation  Rosmery Junior 81 y o  male MRN: [id number]  Encounter: [id number]    ASSESSMENT AND PLAN:    Rosmery Junior is a 81 y o  old male with PMH: DM2 not on therapy, HTN who presents for colon cancer screening  #Screening Colonoscopy  Patient to undergo age appropriate screening colonoscopy  Patient with hx of diabetes previously, most recent A1c WNL so will try and schedule earlier in day  Not on any blood thinners, antiplatelet agents  Patient at average risk for colon cancer with no high risk features endorsed  Patient has never had any previous colon cancer screening (invasive or non-invasive)  Has never had conscious sedation previously  Plan:  -Currently average risk for colonoscopy, other options for colorectal cancer screening were discussed with the patient, will perform colonoscopy, risk and benefits of the procedure were discussed with the patient including but not limited to bleeding, infection, perforation and missing an adenoma  -colonoscopy ordered today    #Mild Hepatic Steatosis  Was having RUQ pain last year, underwent RUQ US and was found to have mild hepatitc steatosis  Patient has lost 10lbs since that time  -increased vegetable and fruit intake per patient  -encouraged exercise 5x weekly at least 30 minutes  -food journal      HCV Screening  Patient born in 1965, per new guidelines all patients should be screened, and has not been screened for hep C  Denies IVDU but will get one time screening  Given patient has migrated from [place] he is at high risk for hepatitis-B so will screen for that as well    -f/u Hep C Ab   -f/u Hep B serologies     ______________________________________________________________________    HPI:    Patient reffered by Davie Vila PA-C  Patient presenting for colonoscopy evaluation in the setting of colon cancer screening  Patient  states that he moved from [place] in 1977    Since that time he was mostly in [place] and occasionally got medical care but states that he recently moved here and since then has started to undergo healthcare and passing more time to his health  States that he does not have any significant illness and feels well overall  About a year ago patient states he was having some intermittent right upper quadrant abdominal pain  Had RUQ US at that time which showed hepatic steaosis  States since he was told he had evidence of fatty liver he has been exercising more increases vegetable intake  He is trying to eat healthier and states he has lost 10 lbs  Patient denies any hematemesis, melena, or hematochezia  Does not endorse any recent N/V/F/C  Denies any change in bowel habits, no new constipation or diarrhea  Family history:  No hx of colon cancer in family  Last EGD: Never  Last colonoscopy: Never    REVIEW OF SYSTEMS:    CONSTITUTIONAL: Denies any fever, chills, rigors, and weight loss  HEENT: No earache or tinnitus  Denies hearing loss or visual disturbances  CARDIOVASCULAR: No chest pain or palpitations  RESPIRATORY: Denies any cough, hemoptysis, shortness of breath or dyspnea on exertion  GASTROINTESTINAL: As noted in the History of Present Illness  GENITOURINARY: No problems with urination  Denies any hematuria or dysuria  NEUROLOGIC: No dizziness or vertigo, denies headaches  MUSCULOSKELETAL: Denies any muscle or joint pain  SKIN: Denies skin rashes or itching  ENDOCRINE: Denies excessive thirst  Denies intolerance to heat or cold  PSYCHOSOCIAL: Denies depression or anxiety  Denies any recent memory loss  Historical Information   No past medical history on file  No past surgical history on file    Social History   Social History     Substance and Sexual Activity   Alcohol Use No     Social History     Substance and Sexual Activity   Drug Use No     Social History     Tobacco Use   Smoking Status Never Smoker   Smokeless Tobacco Never Used     Family History   Problem Relation Age of Onset    No Known Problems Mother     No Known Problems Father     Diabetes Sister     Diabetes Brother     No Known Problems Son     No Known Problems Daughter     No Known Problems Sister     No Known Problems Sister     No Known Problems Sister     No Known Problems Sister     No Known Problems Brother     No Known Problems Brother     No Known Problems Brother     No Known Problems Son     No Known Problems Son     No Known Problems Daughter        Meds/Allergies       Current Outpatient Medications:     amLODIPine (NORVASC) 5 mg tablet    ketoconazole (NIZORAL) 2 % cream  No Known Allergies    Objective     There were no vitals taken for this visit  There is no height or weight on file to calculate BMI  PHYSICAL EXAM:      General Appearance:   Well-appearing healthy male who is alert, cooperative, no distress   HEENT:   Normocephalic, atraumatic, anicteric   Neck:  Supple, symmetrical, trachea midline   Lungs:   Clear to auscultation bilaterally; no rales, rhonchi or wheezing; respirations unlabored    Heart:   Regular rate and rhythm; no murmur, rub, or gallop  Abdomen:   Soft, non-tender, non-distended; normal bowel sounds; no masses, no organomegaly    Genitalia:   Deferred    Rectal:   Deferred    Extremities:  No cyanosis, clubbing or edema    Pulses:  2+ and symmetric    Skin:  No jaundice, rashes, or lesions    Lymph nodes:  No palpable cervical lymphadenopathy      Lab Results:   No visits with results within 1 Day(s) from this visit     Latest known visit with results is:   Appointment on 07/26/2019   Component Date Value    Sodium 07/26/2019 144     Potassium 07/26/2019 4 0     Chloride 07/26/2019 109*    CO2 07/26/2019 32     ANION GAP 07/26/2019 3*    BUN 07/26/2019 14     Creatinine 07/26/2019 1 02     Glucose, Fasting 07/26/2019 87     Calcium 07/26/2019 9 3     AST 07/26/2019 16     ALT 07/26/2019 17     Alkaline Phosphatase 07/26/2019 53     Total Protein 07/26/2019 7 9     Albumin 07/26/2019 4 0     Total Bilirubin 07/26/2019 0 70     eGFR 07/26/2019 88     Hemoglobin A1C 07/26/2019 6 2     EAG 07/26/2019 131     Cholesterol 07/26/2019 175     Triglycerides 07/26/2019 77     HDL, Direct 07/26/2019 61*    LDL Calculated 07/26/2019 99     Creatinine, Ur 07/26/2019 171 0     Microalbum  ,U,Random 07/26/2019 7 2     Microalb Creat Ratio 07/26/2019 4      Radiology Results:   RUQ US - reviewed     ---------------------------------------------------  Note Electronically Signed By:    MD [facility] PGY-4  [address] #: [id number]

## 2019-11-20 NOTE — PATIENT INSTRUCTIONS
Patient scheduled with Dr Joshua Scott at [facility] on 1/9/20  Golytely/Dulcolax instructions given to patient

## 2019-12-03 DIAGNOSIS — Z12.11 COLON CANCER SCREENING: Primary | ICD-10-CM

## 2019-12-27 ENCOUNTER — APPOINTMENT (OUTPATIENT)
Dept: LAB | Facility: CLINIC | Age: 69
End: 2019-12-27
Payer: COMMERCIAL

## 2019-12-27 DIAGNOSIS — E11.9 TYPE 2 DIABETES, DIET CONTROLLED (HCC): Chronic | ICD-10-CM

## 2019-12-27 DIAGNOSIS — Z11.59 NEED FOR HEPATITIS C SCREENING TEST: ICD-10-CM

## 2019-12-27 DIAGNOSIS — I10 ESSENTIAL HYPERTENSION: Chronic | ICD-10-CM

## 2019-12-27 DIAGNOSIS — Z11.59 NEED FOR HEPATITIS B SCREENING TEST: ICD-10-CM

## 2019-12-27 LAB
CREAT UR-MCNC: 219 MG/DL
HBV CORE AB SER QL: NORMAL
HBV SURFACE AB SER-ACNC: <3.1 MIU/ML
HBV SURFACE AG SER QL: NORMAL
HCV AB SER QL: NORMAL
MICROALBUMIN UR-MCNC: 55 MG/L (ref 0–20)
MICROALBUMIN/CREAT 24H UR: 25 MG/G CREATININE (ref 0–30)

## 2019-12-27 PROCEDURE — 86704 HEP B CORE ANTIBODY TOTAL: CPT

## 2019-12-27 PROCEDURE — 82570 ASSAY OF URINE CREATININE: CPT

## 2019-12-27 PROCEDURE — 82043 UR ALBUMIN QUANTITATIVE: CPT

## 2019-12-27 PROCEDURE — 86803 HEPATITIS C AB TEST: CPT

## 2019-12-27 PROCEDURE — 86706 HEP B SURFACE ANTIBODY: CPT

## 2019-12-27 PROCEDURE — 87340 HEPATITIS B SURFACE AG IA: CPT

## 2019-12-27 PROCEDURE — 36415 COLL VENOUS BLD VENIPUNCTURE: CPT

## 2020-01-08 ENCOUNTER — ANESTHESIA EVENT (OUTPATIENT)
Dept: GASTROENTEROLOGY | Facility: HOSPITAL | Age: 70
End: 2020-01-08

## 2020-01-08 DIAGNOSIS — Z12.11 COLON CANCER SCREENING: Primary | ICD-10-CM

## 2020-01-08 NOTE — TELEPHONE ENCOUNTER
Sample dispense  [facility]#[id number]   EXP-11/21      Patient will  suprep sample today at [facility]

## 2020-01-08 NOTE — TELEPHONE ENCOUNTER
Patient picked up his sample of Suprep and written instructions and directions to the GI Lab were given to patient

## 2020-01-09 ENCOUNTER — HOSPITAL ENCOUNTER (OUTPATIENT)
Dept: GASTROENTEROLOGY | Facility: HOSPITAL | Age: 70
Setting detail: OUTPATIENT SURGERY
Discharge: HOME | End: 2020-01-09
Attending: INTERNAL MEDICINE | Admitting: INTERNAL MEDICINE
Payer: COMMERCIAL

## 2020-01-09 ENCOUNTER — ANESTHESIA (OUTPATIENT)
Dept: GASTROENTEROLOGY | Facility: HOSPITAL | Age: 70
End: 2020-01-09

## 2020-01-09 VITALS
HEIGHT: 65 IN | HEART RATE: 58 BPM | RESPIRATION RATE: 20 BRPM | SYSTOLIC BLOOD PRESSURE: 140 MMHG | WEIGHT: 176 LBS | OXYGEN SATURATION: 96 % | TEMPERATURE: 96.9 F | DIASTOLIC BLOOD PRESSURE: 80 MMHG | BODY MASS INDEX: 29.32 KG/M2

## 2020-01-09 DIAGNOSIS — Z12.11 SCREENING FOR COLON CANCER: ICD-10-CM

## 2020-01-09 PROCEDURE — G0121 COLON CA SCRN NOT HI RSK IND: HCPCS | Performed by: INTERNAL MEDICINE

## 2020-01-09 RX ORDER — LIDOCAINE HYDROCHLORIDE 10 MG/ML
INJECTION, SOLUTION EPIDURAL; INFILTRATION; INTRACAUDAL; PERINEURAL AS NEEDED
Status: DISCONTINUED | OUTPATIENT
Start: 2020-01-09 | End: 2020-01-09 | Stop reason: SURG

## 2020-01-09 RX ORDER — PROPOFOL 10 MG/ML
INJECTION, EMULSION INTRAVENOUS AS NEEDED
Status: DISCONTINUED | OUTPATIENT
Start: 2020-01-09 | End: 2020-01-09 | Stop reason: SURG

## 2020-01-09 RX ORDER — SODIUM CHLORIDE 9 MG/ML
125 INJECTION, SOLUTION INTRAVENOUS CONTINUOUS
Status: CANCELLED | OUTPATIENT
Start: 2020-01-09

## 2020-01-09 RX ORDER — SODIUM CHLORIDE, SODIUM LACTATE, POTASSIUM CHLORIDE, CALCIUM CHLORIDE 600; 310; 30; 20 MG/100ML; MG/100ML; MG/100ML; MG/100ML
INJECTION, SOLUTION INTRAVENOUS CONTINUOUS PRN
Status: DISCONTINUED | OUTPATIENT
Start: 2020-01-09 | End: 2020-01-09 | Stop reason: SURG

## 2020-01-09 RX ADMIN — PROPOFOL 30 MG: 10 INJECTION, EMULSION INTRAVENOUS at 10:23

## 2020-01-09 RX ADMIN — PROPOFOL 120 MG: 10 INJECTION, EMULSION INTRAVENOUS at 10:14

## 2020-01-09 RX ADMIN — LIDOCAINE HYDROCHLORIDE 80 MG: 10 INJECTION, SOLUTION EPIDURAL; INFILTRATION; INTRACAUDAL; PERINEURAL at 10:13

## 2020-01-09 RX ADMIN — SODIUM CHLORIDE, SODIUM LACTATE, POTASSIUM CHLORIDE, AND CALCIUM CHLORIDE: .6; .31; .03; .02 INJECTION, SOLUTION INTRAVENOUS at 09:57

## 2020-01-09 RX ADMIN — PROPOFOL 30 MG: 10 INJECTION, EMULSION INTRAVENOUS at 10:27

## 2020-01-09 RX ADMIN — PROPOFOL 20 MG: 10 INJECTION, EMULSION INTRAVENOUS at 10:19

## 2020-01-09 RX ADMIN — PROPOFOL 30 MG: 10 INJECTION, EMULSION INTRAVENOUS at 10:17

## 2020-01-09 RX ADMIN — PROPOFOL 30 MG: 10 INJECTION, EMULSION INTRAVENOUS at 10:35

## 2020-01-09 RX ADMIN — PHENYLEPHRINE HYDROCHLORIDE 100 MCG: 10 INJECTION INTRAVENOUS at 10:19

## 2020-01-09 RX ADMIN — PROPOFOL 30 MG: 10 INJECTION, EMULSION INTRAVENOUS at 10:31

## 2020-01-09 NOTE — ANESTHESIA PREPROCEDURE EVALUATION
Review of Systems/Medical History  Patient summary reviewed  Chart reviewed  No history of anesthetic complications     Cardiovascular  Exercise tolerance (METS): >4,  Hypertension , Dysrhythmias , 1st degree block,    Pulmonary  Not a smoker ,        GI/Hepatic    Liver disease (fatty liver disease) , Bowel prep            Endo/Other  No diabetes ,      GYN       Hematology   Musculoskeletal       Neurology   Psychology           Physical Exam    Airway    Mallampati score: II  TM Distance: >3 FB  Neck ROM: full     Dental   No notable dental hx     Cardiovascular  Cardiovascular exam normal    Pulmonary  Pulmonary exam normal     Other Findings        Anesthesia Plan  ASA Score- 2     Anesthesia Type- IV sedation with anesthesia with ASA Monitors  Additional Monitors:   Airway Plan:         Plan Factors-    Induction- intravenous  Postoperative Plan-     Informed Consent- Anesthetic plan and risks discussed with patient  I personally reviewed this patient with the CRNA  Discussed and agreed on the Anesthesia Plan with the CRNA  Martha Whaley

## 2020-01-09 NOTE — ANESTHESIA POSTPROCEDURE EVALUATION
Post-Op Assessment Note    CV Status:  Stable  Pain Score: 0    Pain management: adequate     Mental Status:  Awake   Hydration Status:  Stable   PONV Controlled:  Controlled   Airway Patency:  Patent   Post Op Vitals Reviewed: Yes      Staff: CRNA           /75 (01/09/20 1040)    Temp     Pulse 68 (01/09/20 1040)   Resp 18 (01/09/20 1040)    SpO2 99 % (01/09/20 1040)

## 2020-01-09 NOTE — H&P
History and Physical -  [facility]  Moon Dumont 81 y o  male MRN: [id number]                  HPI: Moon Dumont is a 81y o  year old male who presents for screening colonoscopy  This is patient's index colonoscopy  REVIEW OF SYSTEMS: Per the HPI, and otherwise unremarkable  Historical Information   History reviewed  No pertinent past medical history  History reviewed  No pertinent surgical history  Social History   Social History     Substance and Sexual Activity   Alcohol Use No     Social History     Substance and Sexual Activity   Drug Use No     Social History     Tobacco Use   Smoking Status Never Smoker   Smokeless Tobacco Never Used     Family History   Problem Relation Age of Onset    No Known Problems Mother     No Known Problems Father     Diabetes Sister     Diabetes Brother     No Known Problems Son     No Known Problems Daughter     No Known Problems Sister     No Known Problems Sister     No Known Problems Sister     No Known Problems Sister     No Known Problems Brother     No Known Problems Brother     No Known Problems Brother     No Known Problems Son     No Known Problems Son     No Known Problems Daughter        Meds/Allergies       (Not in a hospital admission)    No Known Allergies    Objective     Blood pressure 154/73, pulse 61, temperature (!) 96 9 °F (36 1 °C), temperature source Tympanic, resp  rate 18, height 5' 5" (1 651 m), weight 79 8 kg (176 lb), SpO2 100 %  PHYSICAL EXAM    Gen: NAD  CV: RRR  CHEST: Clear  ABD: soft, NT/ND  EXT: no edema      ASSESSMENT/PLAN:  This is a 81y o  year old male here for screening colonoscopy  Patient is stable and optimized for procedure      PLAN:   Procedure:  Colonoscopy

## 2020-01-17 ENCOUNTER — TELEPHONE (OUTPATIENT)
Dept: INTERNAL MEDICINE CLINIC | Facility: CLINIC | Age: 70
End: 2020-01-17

## 2020-01-21 ENCOUNTER — CLINICAL SUPPORT (OUTPATIENT)
Dept: INTERNAL MEDICINE CLINIC | Facility: CLINIC | Age: 70
End: 2020-01-21

## 2020-01-21 DIAGNOSIS — Z23 NEED FOR HEPATITIS B VACCINATION: Primary | ICD-10-CM

## 2020-01-21 PROCEDURE — 90746 HEPB VACCINE 3 DOSE ADULT IM: CPT | Performed by: INTERNAL MEDICINE

## 2020-01-21 PROCEDURE — 90471 IMMUNIZATION ADMIN: CPT | Performed by: INTERNAL MEDICINE

## 2020-01-21 NOTE — PROGRESS NOTES
Patient was here today for 1st Hep-B vaccine  Patient was administered Hep-B in Left Deltoid patient tolerated well  Made patient aware to schedule a nurse visit for 1-2 months for second Hep- B   Patient verbalized understanding

## 2020-01-30 ENCOUNTER — APPOINTMENT (OUTPATIENT)
Dept: LAB | Facility: CLINIC | Age: 70
End: 2020-01-30
Payer: COMMERCIAL

## 2020-01-30 DIAGNOSIS — I10 ESSENTIAL HYPERTENSION: Chronic | ICD-10-CM

## 2020-01-30 DIAGNOSIS — E11.9 TYPE 2 DIABETES, DIET CONTROLLED (HCC): Chronic | ICD-10-CM

## 2020-01-30 LAB
ALBUMIN SERPL BCP-MCNC: 3.9 G/DL (ref 3.5–5)
ALP SERPL-CCNC: 49 U/L (ref 46–116)
ALT SERPL W P-5'-P-CCNC: 19 U/L (ref 12–78)
ANION GAP SERPL CALCULATED.3IONS-SCNC: 2 MMOL/L (ref 4–13)
AST SERPL W P-5'-P-CCNC: 13 U/L (ref 5–45)
BILIRUB SERPL-MCNC: 0.7 MG/DL (ref 0.2–1)
BUN SERPL-MCNC: 13 MG/DL (ref 5–25)
CALCIUM SERPL-MCNC: 9.3 MG/DL (ref 8.3–10.1)
CHLORIDE SERPL-SCNC: 106 MMOL/L (ref 100–108)
CHOLEST SERPL-MCNC: 187 MG/DL (ref 50–200)
CO2 SERPL-SCNC: 30 MMOL/L (ref 21–32)
CREAT SERPL-MCNC: 1.08 MG/DL (ref 0.6–1.3)
EST. AVERAGE GLUCOSE BLD GHB EST-MCNC: 151 MG/DL
GFR SERPL CREATININE-BSD FRML MDRD: 82 ML/MIN/1.73SQ M
GLUCOSE P FAST SERPL-MCNC: 103 MG/DL (ref 65–99)
HBA1C MFR BLD: 6.9 % (ref 4.2–6.3)
HDLC SERPL-MCNC: 51 MG/DL
LDLC SERPL CALC-MCNC: 113 MG/DL (ref 0–100)
POTASSIUM SERPL-SCNC: 4 MMOL/L (ref 3.5–5.3)
PROT SERPL-MCNC: 8.3 G/DL (ref 6.4–8.2)
SODIUM SERPL-SCNC: 138 MMOL/L (ref 136–145)
TRIGL SERPL-MCNC: 113 MG/DL

## 2020-01-30 PROCEDURE — 80053 COMPREHEN METABOLIC PANEL: CPT

## 2020-01-30 PROCEDURE — 36415 COLL VENOUS BLD VENIPUNCTURE: CPT

## 2020-01-30 PROCEDURE — 83036 HEMOGLOBIN GLYCOSYLATED A1C: CPT

## 2020-01-30 PROCEDURE — 80061 LIPID PANEL: CPT

## 2020-02-03 ENCOUNTER — TELEPHONE (OUTPATIENT)
Dept: INTERNAL MEDICINE CLINIC | Facility: CLINIC | Age: 70
End: 2020-02-03

## 2020-02-03 NOTE — TELEPHONE ENCOUNTER
----- Message from Pete Cristina PA-C sent at 1/31/2020  3:40 PM EST -----  Please call pt, reviewed lab results and his A1C test for diabetes is unfortunately higher now, was 6 2, now 6 9  I would advise he schedule appt with Kwabena Gordon to address test results, as he is also due for his 6 month f/u now as well   TY

## 2020-02-25 ENCOUNTER — CLINICAL SUPPORT (OUTPATIENT)
Dept: INTERNAL MEDICINE CLINIC | Facility: CLINIC | Age: 70
End: 2020-02-25

## 2020-02-25 DIAGNOSIS — Z23 NEED FOR HEPATITIS B VACCINATION: Primary | ICD-10-CM

## 2020-02-25 PROCEDURE — 90471 IMMUNIZATION ADMIN: CPT | Performed by: INTERNAL MEDICINE

## 2020-02-25 PROCEDURE — 90746 HEPB VACCINE 3 DOSE ADULT IM: CPT | Performed by: INTERNAL MEDICINE

## 2020-04-08 ENCOUNTER — TELEMEDICINE (OUTPATIENT)
Dept: INTERNAL MEDICINE CLINIC | Facility: CLINIC | Age: 70
End: 2020-04-08

## 2020-04-08 DIAGNOSIS — K57.30 SIGMOID DIVERTICULOSIS: ICD-10-CM

## 2020-04-08 DIAGNOSIS — E11.9 TYPE 2 DIABETES, DIET CONTROLLED (HCC): Primary | Chronic | ICD-10-CM

## 2020-04-08 DIAGNOSIS — E78.00 ELEVATED LDL CHOLESTEROL LEVEL: Chronic | ICD-10-CM

## 2020-04-08 PROCEDURE — G2012 BRIEF CHECK IN BY MD/QHP: HCPCS | Performed by: PHYSICIAN ASSISTANT

## 2020-05-07 ENCOUNTER — APPOINTMENT (OUTPATIENT)
Dept: LAB | Facility: HOSPITAL | Age: 70
End: 2020-05-07
Payer: COMMERCIAL

## 2020-05-07 DIAGNOSIS — E11.9 TYPE 2 DIABETES, DIET CONTROLLED (HCC): Chronic | ICD-10-CM

## 2020-05-07 DIAGNOSIS — E78.00 ELEVATED LDL CHOLESTEROL LEVEL: Chronic | ICD-10-CM

## 2020-05-07 LAB
ALBUMIN SERPL BCP-MCNC: 3.9 G/DL (ref 3.5–5)
ALP SERPL-CCNC: 47 U/L (ref 46–116)
ALT SERPL W P-5'-P-CCNC: 23 U/L (ref 12–78)
ANION GAP SERPL CALCULATED.3IONS-SCNC: 5 MMOL/L (ref 4–13)
AST SERPL W P-5'-P-CCNC: 22 U/L (ref 5–45)
BILIRUB SERPL-MCNC: 0.65 MG/DL (ref 0.2–1)
BUN SERPL-MCNC: 15 MG/DL (ref 5–25)
CALCIUM SERPL-MCNC: 9 MG/DL (ref 8.3–10.1)
CHLORIDE SERPL-SCNC: 109 MMOL/L (ref 100–108)
CHOLEST SERPL-MCNC: 159 MG/DL (ref 50–200)
CO2 SERPL-SCNC: 29 MMOL/L (ref 21–32)
CREAT SERPL-MCNC: 1.02 MG/DL (ref 0.6–1.3)
EST. AVERAGE GLUCOSE BLD GHB EST-MCNC: 143 MG/DL
GFR SERPL CREATININE-BSD FRML MDRD: 88 ML/MIN/1.73SQ M
GLUCOSE P FAST SERPL-MCNC: 77 MG/DL (ref 65–99)
HBA1C MFR BLD: 6.6 %
HDLC SERPL-MCNC: 42 MG/DL
LDLC SERPL CALC-MCNC: 100 MG/DL (ref 0–100)
POTASSIUM SERPL-SCNC: 4.1 MMOL/L (ref 3.5–5.3)
PROT SERPL-MCNC: 8 G/DL (ref 6.4–8.2)
SODIUM SERPL-SCNC: 143 MMOL/L (ref 136–145)
TRIGL SERPL-MCNC: 86 MG/DL

## 2020-05-07 PROCEDURE — 36415 COLL VENOUS BLD VENIPUNCTURE: CPT

## 2020-05-07 PROCEDURE — 80053 COMPREHEN METABOLIC PANEL: CPT

## 2020-05-07 PROCEDURE — 83036 HEMOGLOBIN GLYCOSYLATED A1C: CPT

## 2020-05-07 PROCEDURE — 3044F HG A1C LEVEL LT 7.0%: CPT | Performed by: PHYSICIAN ASSISTANT

## 2020-05-07 PROCEDURE — 80061 LIPID PANEL: CPT

## 2020-06-05 ENCOUNTER — TELEPHONE (OUTPATIENT)
Dept: INTERNAL MEDICINE CLINIC | Facility: CLINIC | Age: 70
End: 2020-06-05

## 2020-06-08 ENCOUNTER — OFFICE VISIT (OUTPATIENT)
Dept: INTERNAL MEDICINE CLINIC | Facility: CLINIC | Age: 70
End: 2020-06-08

## 2020-06-08 VITALS
WEIGHT: 178.57 LBS | HEIGHT: 65 IN | SYSTOLIC BLOOD PRESSURE: 140 MMHG | HEART RATE: 58 BPM | BODY MASS INDEX: 29.75 KG/M2 | TEMPERATURE: 96.8 F | OXYGEN SATURATION: 97 % | DIASTOLIC BLOOD PRESSURE: 80 MMHG

## 2020-06-08 DIAGNOSIS — I10 ESSENTIAL HYPERTENSION: Chronic | ICD-10-CM

## 2020-06-08 DIAGNOSIS — B35.3 TINEA PEDIS OF BOTH FEET: Chronic | ICD-10-CM

## 2020-06-08 DIAGNOSIS — E11.9 TYPE 2 DIABETES, DIET CONTROLLED (HCC): Primary | Chronic | ICD-10-CM

## 2020-06-08 DIAGNOSIS — Z12.5 PROSTATE CANCER SCREENING: ICD-10-CM

## 2020-06-08 DIAGNOSIS — E66.3 OVERWEIGHT: Chronic | ICD-10-CM

## 2020-06-08 PROBLEM — E66.09 CLASS 1 OBESITY DUE TO EXCESS CALORIES WITHOUT SERIOUS COMORBIDITY WITH BODY MASS INDEX (BMI) OF 30.0 TO 30.9 IN ADULT: Status: RESOLVED | Noted: 2018-12-13 | Resolved: 2020-06-08

## 2020-06-08 PROBLEM — E66.811 CLASS 1 OBESITY DUE TO EXCESS CALORIES WITHOUT SERIOUS COMORBIDITY WITH BODY MASS INDEX (BMI) OF 30.0 TO 30.9 IN ADULT: Status: RESOLVED | Noted: 2018-12-13 | Resolved: 2020-06-08

## 2020-06-08 PROCEDURE — 99213 OFFICE O/P EST LOW 20 MIN: CPT | Performed by: PHYSICIAN ASSISTANT

## 2020-06-08 PROCEDURE — 1036F TOBACCO NON-USER: CPT | Performed by: PHYSICIAN ASSISTANT

## 2020-06-08 PROCEDURE — 1101F PT FALLS ASSESS-DOCD LE1/YR: CPT | Performed by: PHYSICIAN ASSISTANT

## 2020-06-08 PROCEDURE — 3077F SYST BP >= 140 MM HG: CPT | Performed by: PHYSICIAN ASSISTANT

## 2020-06-08 PROCEDURE — 3008F BODY MASS INDEX DOCD: CPT | Performed by: PHYSICIAN ASSISTANT

## 2020-06-08 PROCEDURE — 3288F FALL RISK ASSESSMENT DOCD: CPT | Performed by: PHYSICIAN ASSISTANT

## 2020-06-08 PROCEDURE — 3044F HG A1C LEVEL LT 7.0%: CPT | Performed by: PHYSICIAN ASSISTANT

## 2020-06-08 PROCEDURE — 2022F DILAT RTA XM EVC RTNOPTHY: CPT | Performed by: PHYSICIAN ASSISTANT

## 2020-06-08 PROCEDURE — 3079F DIAST BP 80-89 MM HG: CPT | Performed by: PHYSICIAN ASSISTANT

## 2020-06-08 PROCEDURE — 1160F RVW MEDS BY RX/DR IN RCRD: CPT | Performed by: PHYSICIAN ASSISTANT

## 2020-06-08 RX ORDER — KETOCONAZOLE 20 MG/G
CREAM TOPICAL DAILY
Qty: 15 G | Refills: 2 | Status: SHIPPED | OUTPATIENT
Start: 2020-06-08 | End: 2021-02-11 | Stop reason: HOSPADM

## 2020-07-07 ENCOUNTER — TELEPHONE (OUTPATIENT)
Dept: INTERNAL MEDICINE CLINIC | Facility: CLINIC | Age: 70
End: 2020-07-07

## 2020-07-07 NOTE — TELEPHONE ENCOUNTER
Received request from [address] [employer] for progress note regarding his blood pressure machine Faxed over last couple of progress notes  Received confirmation  Scanned request into chart

## 2021-02-05 ENCOUNTER — TELEMEDICINE (OUTPATIENT)
Dept: INTERNAL MEDICINE CLINIC | Facility: CLINIC | Age: 71
End: 2021-02-05

## 2021-02-05 DIAGNOSIS — R05.3 CHRONIC COUGH: ICD-10-CM

## 2021-02-05 DIAGNOSIS — M54.2 ACUTE NECK PAIN: ICD-10-CM

## 2021-02-05 DIAGNOSIS — J30.9 ALLERGIC RHINITIS, UNSPECIFIED SEASONALITY, UNSPECIFIED TRIGGER: Primary | ICD-10-CM

## 2021-02-05 PROCEDURE — G2025 DIS SITE TELE SVCS RHC/FQHC: HCPCS | Performed by: PHYSICIAN ASSISTANT

## 2021-02-05 RX ORDER — FLUTICASONE PROPIONATE 50 MCG
2 SPRAY, SUSPENSION (ML) NASAL DAILY
Qty: 1 BOTTLE | Refills: 0 | Status: SHIPPED | OUTPATIENT
Start: 2021-02-05

## 2021-02-05 RX ORDER — BENZONATATE 200 MG/1
200 CAPSULE ORAL 3 TIMES DAILY PRN
Qty: 30 CAPSULE | Refills: 1 | Status: SHIPPED | OUTPATIENT
Start: 2021-02-05 | End: 2021-03-11 | Stop reason: ALTCHOICE

## 2021-02-05 RX ORDER — MELOXICAM 15 MG/1
15 TABLET ORAL DAILY
Qty: 30 TABLET | Refills: 0 | Status: SHIPPED | OUTPATIENT
Start: 2021-02-05 | End: 2021-03-11 | Stop reason: ALTCHOICE

## 2021-02-05 RX ORDER — METHOCARBAMOL 500 MG/1
500 TABLET, FILM COATED ORAL
Qty: 30 TABLET | Refills: 0 | Status: SHIPPED | OUTPATIENT
Start: 2021-02-05 | End: 2022-01-27 | Stop reason: ALTCHOICE

## 2021-02-05 RX ORDER — LORATADINE 10 MG/1
10 TABLET ORAL DAILY
Qty: 30 TABLET | Refills: 0 | Status: SHIPPED | OUTPATIENT
Start: 2021-02-05 | End: 2021-03-11 | Stop reason: ALTCHOICE

## 2021-02-05 NOTE — PROGRESS NOTES
Virtual Brief Visit    Assessment/Plan:    Problem List Items Addressed This Visit     None      Visit Diagnoses     Allergic rhinitis, unspecified seasonality, unspecified trigger    -  Primary    Relevant Medications    loratadine (CLARITIN) 10 mg tablet    fluticasone (FLONASE) 50 mcg/act nasal spray    meloxicam (MOBIC) 15 mg tablet    Chronic cough        Relevant Medications    benzonatate (TESSALON) 200 MG capsule    Acute neck pain        Relevant Medications    meloxicam (MOBIC) 15 mg tablet    methocarbamol (ROBAXIN) 500 mg tablet         79-year-old male presenting for tele med visit reportedly for cough, runny nose and ear symptoms however after further discussion with patient he states this is something that has been going on intermittently for the past 6 months and just restarted again almost daily for the past 2 or 3 months  He also is concerned about 2 months of new onset neck pain without injury  His treatment today is solely based on patient's reported sxs, as there was poor reception to conduct video visit and he was in his car  Regarding his neck, suspect arthritis based on his age, however does not seem to have any concerning sxs warranting imaging  Will trial combination of meloxicam 15mg w/ food in AM, with robaxin 500mg QHS x 2 weeks, in addition to regular neck stretches found online, as well as heat compresses  If no better after consistent treatment, may need to consider xray cervical spine and PT  Will need in office examination as well  For his URI sxs, less likely suspect covid as these sxs are reportedly chronic and no different than his previous experience with sxs, also no known covid contact  Will tx symptomatically for 2-3 weeks  Pt to start fluticasone nasal spray 2 sprays each nostril daily, in addition to loratadine 10mg daily x 2-3 weeks  Benzonatate cough pills also prescribed TID for cough control prn      Pt advised to contact office in a few weeks for in office assessment if medication and treatment plans are not helping  He expresses understanding of our discussion and is aware medications were sent in electronically to his [email or web address]  Recommended level OV: 18872          Reason for visit is   Chief Complaint   Patient presents with    Virtual Brief Visit     cough with white phlemg,no other symptoms    COVID-19    Virtual Brief Visit        Encounter provider Raffi Rodarte PA-C    Provider located at [facility]-[address]  [facility] [address]  [address] [id number] [phone]    Recent Visits  No visits were found meeting these conditions  Showing recent visits within past 7 days and meeting all other requirements     Today's Visits  Date Type Provider Dept   02/05/21 Telemedicine Raffi Rodarte, [facility] today's visits and meeting all other requirements     Future Appointments  No visits were found meeting these conditions  Showing future appointments within next 150 days and meeting all other requirements        After connecting through telephone, the patient was identified by name and date of birth  Geovanna Ibarra was informed that this is a telemedicine visit and that the visit is being conducted through telephone  My office door was closed  No one else was in the room  He acknowledged consent and understanding of privacy and security of the platform  The patient has agreed to participate and understands he can discontinue the visit at any time  Patient is aware this is a billable service  Subjective    Geovanna Ibarra is a 78 y o  male presenting today for multiple complaints  69y/o male here today for virtual visit for multiple concerns  Patient in car pulled over for visit, poor reception, could not get video or doximity telecall to work, I called from regular phone       States he has had a cough and runny nose off and on for past 6 months, states it comes and goes, most recently symptoms almost daily past 2-3 months  Reports a dry cough, sometimes productive with clear or white phlegm  Also associated runny nose and sneezing, itchy throat  Denies chest pains or SOB, denies chest tightness or SOB  Denies eye sxs  Denies fevers or chills, headaches or body aches, loss of taste or smell  Denies any covid contacts  Reports sxs are no worse or different than what he has been experiencing  He has not taken anything for the sxs the entire 6 months he has had sxs and has not been seen anywhere else prior for evaluation of the sxs  No hx of allergies  He is also c/o of 2 months of neck pain "in the cartilage"  Denies any known injury at onset of the pain  States pain is in the middle of over the spine  Denies radiation of pain into UE's, denies N/T or weakness in his UE's  States the pain is primarily worse with turning his head  Pain occurring almost daily  New occurrence,  denies neck pain or injury in the past       Pain at present, pain level is a 6/10 in severity  History reviewed  No pertinent past medical history  History reviewed  No pertinent surgical history      Current Outpatient Medications   Medication Sig Dispense Refill    benzonatate (TESSALON) 200 MG capsule Take 1 capsule (200 mg total) by mouth 3 (three) times a day as needed for cough 30 capsule 1    Blood Pressure Monitoring (BLOOD PRESSURE MONITOR/M CUFF) MISC by Does not apply route daily (Patient not taking: Reported on 2/5/2021) 1 each 0    fluticasone (FLONASE) 50 mcg/act nasal spray 2 sprays into each nostril daily 1 Bottle 0    ketoconazole (NIZORAL) 2 % cream Apply topically daily (Patient not taking: Reported on 2/5/2021) 15 g 2    loratadine (CLARITIN) 10 mg tablet Take 1 tablet (10 mg total) by mouth daily 30 tablet 0    meloxicam (MOBIC) 15 mg tablet Take 1 tablet (15 mg total) by mouth daily In the morning with food for neck pain 30 tablet 0    methocarbamol (ROBAXIN) 500 mg tablet Take 1 tablet (500 mg total) by mouth daily at bedtime For neck pain 30 tablet 0     No current facility-administered medications for this visit  No Known Allergies    Review of Systems   Constitutional: Negative  Negative for appetite change, chills, diaphoresis, fatigue and fever  HENT:        As in HPI   Eyes: Negative  Respiratory: Negative for choking, chest tightness, shortness of breath and wheezing  As in HPI   Cardiovascular: Negative  Gastrointestinal: Negative  Genitourinary: Negative  Musculoskeletal:        As in HPI   Neurological: Negative  Vitals:     No exam available as pt did not have good reception to do video, was not working an no clear audio  I spent 15 minutes directly with the patient during this visit    VIRTUAL VISIT DISCLAIMER    Armani Bridges acknowledges that he has consented to an online visit or consultation  He understands that the online visit is based solely on information provided by him, and that, in the absence of a face-to-face physical evaluation by the physician, the diagnosis he receives is both limited and provisional in terms of accuracy and completeness  This is not intended to replace a full medical face-to-face evaluation by the physician  Armani Bridges understands and accepts these terms

## 2021-02-07 ENCOUNTER — HOSPITAL ENCOUNTER (INPATIENT)
Facility: HOSPITAL | Age: 71
LOS: 4 days | Discharge: HOME | DRG: 177 | End: 2021-02-11
Attending: EMERGENCY MEDICINE | Admitting: INTERNAL MEDICINE
Payer: MEDICARE

## 2021-02-07 ENCOUNTER — APPOINTMENT (EMERGENCY)
Dept: RADIOLOGY | Facility: HOSPITAL | Age: 71
DRG: 177 | End: 2021-02-07
Payer: MEDICARE

## 2021-02-07 DIAGNOSIS — J12.82 PNEUMONIA DUE TO COVID-19 VIRUS: ICD-10-CM

## 2021-02-07 DIAGNOSIS — R31.9 HEMATURIA: Primary | ICD-10-CM

## 2021-02-07 DIAGNOSIS — N17.9 AKI (ACUTE KIDNEY INJURY) (HCC): ICD-10-CM

## 2021-02-07 DIAGNOSIS — U07.1 PNEUMONIA DUE TO COVID-19 VIRUS: ICD-10-CM

## 2021-02-07 PROBLEM — E11.9 DM (DIABETES MELLITUS), TYPE 2 (HCC): Status: ACTIVE | Noted: 2021-02-07

## 2021-02-07 PROBLEM — J30.9 ALLERGIC RHINITIS: Status: ACTIVE | Noted: 2021-02-07

## 2021-02-07 PROBLEM — R05.3 CHRONIC COUGH: Status: ACTIVE | Noted: 2021-02-07

## 2021-02-07 PROBLEM — M54.2 NECK PAIN: Status: ACTIVE | Noted: 2021-02-07

## 2021-02-07 LAB
ABO GROUP BLD: NORMAL
ALBUMIN SERPL BCP-MCNC: 3.3 G/DL (ref 3.5–5)
ALP SERPL-CCNC: 37 U/L (ref 46–116)
ALT SERPL W P-5'-P-CCNC: 39 U/L (ref 12–78)
AMORPH URATE CRY URNS QL MICRO: ABNORMAL /HPF
ANION GAP SERPL CALCULATED.3IONS-SCNC: 6 MMOL/L (ref 4–13)
AST SERPL W P-5'-P-CCNC: 76 U/L (ref 5–45)
ATRIAL RATE: 40 BPM
BACTERIA UR QL AUTO: ABNORMAL /HPF
BASOPHILS # BLD AUTO: 0 THOUSANDS/ΜL (ref 0–0.1)
BASOPHILS NFR BLD AUTO: 0 % (ref 0–1)
BILIRUB SERPL-MCNC: 0.62 MG/DL (ref 0.2–1)
BILIRUB UR QL STRIP: NEGATIVE
BUN SERPL-MCNC: 21 MG/DL (ref 5–25)
CA-I BLD-SCNC: 1.06 MMOL/L (ref 1.12–1.32)
CALCIUM ALBUM COR SERPL-MCNC: 9.4 MG/DL (ref 8.3–10.1)
CALCIUM SERPL-MCNC: 8.8 MG/DL (ref 8.3–10.1)
CHLORIDE SERPL-SCNC: 105 MMOL/L (ref 100–108)
CK MB SERPL-MCNC: 2.4 NG/ML (ref 0–5)
CK MB SERPL-MCNC: <1 % (ref 0–2.5)
CK SERPL-CCNC: 984 U/L (ref 39–308)
CLARITY UR: CLEAR
CO2 SERPL-SCNC: 29 MMOL/L (ref 21–32)
COARSE GRAN CASTS URNS QL MICRO: ABNORMAL /LPF
COLOR UR: ABNORMAL
CREAT SERPL-MCNC: 1.42 MG/DL (ref 0.6–1.3)
CRP SERPL QL: 97.2 MG/L
D DIMER PPP FEU-MCNC: 5.46 UG/ML FEU
EOSINOPHIL # BLD AUTO: 0 THOUSAND/ΜL (ref 0–0.61)
EOSINOPHIL NFR BLD AUTO: 0 % (ref 0–6)
ERYTHROCYTE [DISTWIDTH] IN BLOOD BY AUTOMATED COUNT: 12.8 % (ref 11.6–15.1)
EST. AVERAGE GLUCOSE BLD GHB EST-MCNC: 151 MG/DL
FERRITIN SERPL-MCNC: 966 NG/ML (ref 8–388)
FLUAV RNA RESP QL NAA+PROBE: NEGATIVE
FLUBV RNA RESP QL NAA+PROBE: NEGATIVE
GFR SERPL CREATININE-BSD FRML MDRD: 58 ML/MIN/1.73SQ M
GLUCOSE SERPL-MCNC: 124 MG/DL (ref 65–140)
GLUCOSE UR STRIP-MCNC: NEGATIVE MG/DL
HBA1C MFR BLD: 6.9 %
HBV CORE AB SER QL: NORMAL
HBV CORE IGM SER QL: NORMAL
HBV SURFACE AG SER QL: NORMAL
HCT VFR BLD AUTO: 43.7 % (ref 36.5–49.3)
HCV AB SER QL: NORMAL
HGB BLD-MCNC: 14 G/DL (ref 12–17)
HGB UR QL STRIP.AUTO: ABNORMAL
HIV 1+2 AB+HIV1 P24 AG SERPL QL IA: NORMAL
HIV1 P24 AG SER QL: NORMAL
IMM GRANULOCYTES # BLD AUTO: 0.02 THOUSAND/UL (ref 0–0.2)
IMM GRANULOCYTES NFR BLD AUTO: 0 % (ref 0–2)
KETONES UR STRIP-MCNC: NEGATIVE MG/DL
LACTATE SERPL-SCNC: 0.9 MMOL/L (ref 0.5–2)
LEUKOCYTE ESTERASE UR QL STRIP: NEGATIVE
LYMPHOCYTES # BLD AUTO: 0.64 THOUSANDS/ΜL (ref 0.6–4.47)
LYMPHOCYTES NFR BLD AUTO: 13 % (ref 14–44)
MCH RBC QN AUTO: 25.6 PG (ref 26.8–34.3)
MCHC RBC AUTO-ENTMCNC: 32 G/DL (ref 31.4–37.4)
MCV RBC AUTO: 80 FL (ref 82–98)
MONOCYTES # BLD AUTO: 0.21 THOUSAND/ΜL (ref 0.17–1.22)
MONOCYTES NFR BLD AUTO: 4 % (ref 4–12)
NEUTROPHILS # BLD AUTO: 3.93 THOUSANDS/ΜL (ref 1.85–7.62)
NEUTS SEG NFR BLD AUTO: 83 % (ref 43–75)
NITRITE UR QL STRIP: NEGATIVE
NON-SQ EPI CELLS URNS QL MICRO: ABNORMAL /HPF
NRBC BLD AUTO-RTO: 0 /100 WBCS
NT-PROBNP SERPL-MCNC: 9 PG/ML
PH UR STRIP.AUTO: 5.5 [PH] (ref 4.5–8)
PLATELET # BLD AUTO: 151 THOUSANDS/UL (ref 149–390)
PMV BLD AUTO: 10.9 FL (ref 8.9–12.7)
POTASSIUM SERPL-SCNC: 4.1 MMOL/L (ref 3.5–5.3)
PROCALCITONIN SERPL-MCNC: 0.51 NG/ML
PROT SERPL-MCNC: 8.6 G/DL (ref 6.4–8.2)
PROT UR STRIP-MCNC: >=300 MG/DL
QRS AXIS: -84 DEGREES
QRSD INTERVAL: 104 MS
QT INTERVAL: 352 MS
QTC INTERVAL: 403 MS
RBC # BLD AUTO: 5.47 MILLION/UL (ref 3.88–5.62)
RBC #/AREA URNS AUTO: ABNORMAL /HPF
RH BLD: POSITIVE
RSV RNA RESP QL NAA+PROBE: NEGATIVE
SARS-COV-2 RNA RESP QL NAA+PROBE: POSITIVE
SODIUM SERPL-SCNC: 140 MMOL/L (ref 136–145)
SP GR UR STRIP.AUTO: >=1.03 (ref 1–1.03)
T WAVE AXIS: 50 DEGREES
TROPONIN I SERPL-MCNC: <0.02 NG/ML
TSH SERPL DL<=0.05 MIU/L-ACNC: 2.4 UIU/ML (ref 0.36–3.74)
UROBILINOGEN UR QL STRIP.AUTO: 0.2 E.U./DL
VENTRICULAR RATE: 79 BPM
WBC # BLD AUTO: 4.8 THOUSAND/UL (ref 4.31–10.16)
WBC #/AREA URNS AUTO: ABNORMAL /HPF

## 2021-02-07 PROCEDURE — 93010 ELECTROCARDIOGRAM REPORT: CPT | Performed by: INTERNAL MEDICINE

## 2021-02-07 PROCEDURE — 0241U HB NFCT DS VIR RESP RNA 4 TRGT: CPT | Performed by: EMERGENCY MEDICINE

## 2021-02-07 PROCEDURE — NC001 PR NO CHARGE: Performed by: INTERNAL MEDICINE

## 2021-02-07 PROCEDURE — 99285 EMERGENCY DEPT VISIT HI MDM: CPT

## 2021-02-07 PROCEDURE — 82553 CREATINE MB FRACTION: CPT | Performed by: STUDENT IN AN ORGANIZED HEALTH CARE EDUCATION/TRAINING PROGRAM

## 2021-02-07 PROCEDURE — 87806 HIV AG W/HIV1&2 ANTB W/OPTIC: CPT | Performed by: STUDENT IN AN ORGANIZED HEALTH CARE EDUCATION/TRAINING PROGRAM

## 2021-02-07 PROCEDURE — 74177 CT ABD & PELVIS W/CONTRAST: CPT

## 2021-02-07 PROCEDURE — 87340 HEPATITIS B SURFACE AG IA: CPT | Performed by: STUDENT IN AN ORGANIZED HEALTH CARE EDUCATION/TRAINING PROGRAM

## 2021-02-07 PROCEDURE — 71260 CT THORAX DX C+: CPT

## 2021-02-07 PROCEDURE — 96360 HYDRATION IV INFUSION INIT: CPT

## 2021-02-07 PROCEDURE — G1004 CDSM NDSC: HCPCS

## 2021-02-07 PROCEDURE — 86140 C-REACTIVE PROTEIN: CPT | Performed by: STUDENT IN AN ORGANIZED HEALTH CARE EDUCATION/TRAINING PROGRAM

## 2021-02-07 PROCEDURE — 72125 CT NECK SPINE W/O DYE: CPT

## 2021-02-07 PROCEDURE — 70450 CT HEAD/BRAIN W/O DYE: CPT

## 2021-02-07 PROCEDURE — 3066F NEPHROPATHY DOC TX: CPT | Performed by: PHYSICIAN ASSISTANT

## 2021-02-07 PROCEDURE — 83036 HEMOGLOBIN GLYCOSYLATED A1C: CPT | Performed by: STUDENT IN AN ORGANIZED HEALTH CARE EDUCATION/TRAINING PROGRAM

## 2021-02-07 PROCEDURE — 86900 BLOOD TYPING SEROLOGIC ABO: CPT | Performed by: STUDENT IN AN ORGANIZED HEALTH CARE EDUCATION/TRAINING PROGRAM

## 2021-02-07 PROCEDURE — 86901 BLOOD TYPING SEROLOGIC RH(D): CPT | Performed by: STUDENT IN AN ORGANIZED HEALTH CARE EDUCATION/TRAINING PROGRAM

## 2021-02-07 PROCEDURE — 93005 ELECTROCARDIOGRAM TRACING: CPT

## 2021-02-07 PROCEDURE — 96361 HYDRATE IV INFUSION ADD-ON: CPT

## 2021-02-07 PROCEDURE — 84145 PROCALCITONIN (PCT): CPT | Performed by: STUDENT IN AN ORGANIZED HEALTH CARE EDUCATION/TRAINING PROGRAM

## 2021-02-07 PROCEDURE — 86704 HEP B CORE ANTIBODY TOTAL: CPT | Performed by: STUDENT IN AN ORGANIZED HEALTH CARE EDUCATION/TRAINING PROGRAM

## 2021-02-07 PROCEDURE — 85379 FIBRIN DEGRADATION QUANT: CPT | Performed by: STUDENT IN AN ORGANIZED HEALTH CARE EDUCATION/TRAINING PROGRAM

## 2021-02-07 PROCEDURE — 83880 ASSAY OF NATRIURETIC PEPTIDE: CPT | Performed by: STUDENT IN AN ORGANIZED HEALTH CARE EDUCATION/TRAINING PROGRAM

## 2021-02-07 PROCEDURE — 84484 ASSAY OF TROPONIN QUANT: CPT | Performed by: EMERGENCY MEDICINE

## 2021-02-07 PROCEDURE — 99285 EMERGENCY DEPT VISIT HI MDM: CPT | Performed by: EMERGENCY MEDICINE

## 2021-02-07 PROCEDURE — 81001 URINALYSIS AUTO W/SCOPE: CPT

## 2021-02-07 PROCEDURE — 84443 ASSAY THYROID STIM HORMONE: CPT | Performed by: EMERGENCY MEDICINE

## 2021-02-07 PROCEDURE — 86803 HEPATITIS C AB TEST: CPT | Performed by: STUDENT IN AN ORGANIZED HEALTH CARE EDUCATION/TRAINING PROGRAM

## 2021-02-07 PROCEDURE — 82728 ASSAY OF FERRITIN: CPT | Performed by: STUDENT IN AN ORGANIZED HEALTH CARE EDUCATION/TRAINING PROGRAM

## 2021-02-07 PROCEDURE — 87040 BLOOD CULTURE FOR BACTERIA: CPT | Performed by: STUDENT IN AN ORGANIZED HEALTH CARE EDUCATION/TRAINING PROGRAM

## 2021-02-07 PROCEDURE — 36415 COLL VENOUS BLD VENIPUNCTURE: CPT | Performed by: EMERGENCY MEDICINE

## 2021-02-07 PROCEDURE — 83605 ASSAY OF LACTIC ACID: CPT | Performed by: STUDENT IN AN ORGANIZED HEALTH CARE EDUCATION/TRAINING PROGRAM

## 2021-02-07 PROCEDURE — 82330 ASSAY OF CALCIUM: CPT | Performed by: EMERGENCY MEDICINE

## 2021-02-07 PROCEDURE — 86705 HEP B CORE ANTIBODY IGM: CPT | Performed by: STUDENT IN AN ORGANIZED HEALTH CARE EDUCATION/TRAINING PROGRAM

## 2021-02-07 PROCEDURE — 82550 ASSAY OF CK (CPK): CPT | Performed by: STUDENT IN AN ORGANIZED HEALTH CARE EDUCATION/TRAINING PROGRAM

## 2021-02-07 PROCEDURE — 80053 COMPREHEN METABOLIC PANEL: CPT | Performed by: EMERGENCY MEDICINE

## 2021-02-07 PROCEDURE — 85025 COMPLETE CBC W/AUTO DIFF WBC: CPT | Performed by: EMERGENCY MEDICINE

## 2021-02-07 RX ORDER — ACETAMINOPHEN 325 MG/1
650 TABLET ORAL ONCE
Status: COMPLETED | OUTPATIENT
Start: 2021-02-07 | End: 2021-02-07

## 2021-02-07 RX ORDER — ZINC SULFATE 50(220)MG
220 CAPSULE ORAL DAILY
Status: DISCONTINUED | OUTPATIENT
Start: 2021-02-08 | End: 2021-02-11 | Stop reason: HOSPADM

## 2021-02-07 RX ORDER — HEPARIN SODIUM 5000 [USP'U]/ML
5000 INJECTION, SOLUTION INTRAVENOUS; SUBCUTANEOUS EVERY 8 HOURS SCHEDULED
Status: DISCONTINUED | OUTPATIENT
Start: 2021-02-07 | End: 2021-02-07

## 2021-02-07 RX ORDER — BENZONATATE 100 MG/1
200 CAPSULE ORAL 3 TIMES DAILY PRN
Status: DISCONTINUED | OUTPATIENT
Start: 2021-02-07 | End: 2021-02-11 | Stop reason: HOSPADM

## 2021-02-07 RX ORDER — MELATONIN
2000 DAILY
Status: DISCONTINUED | OUTPATIENT
Start: 2021-02-08 | End: 2021-02-11 | Stop reason: HOSPADM

## 2021-02-07 RX ORDER — SODIUM CHLORIDE, SODIUM GLUCONATE, SODIUM ACETATE, POTASSIUM CHLORIDE, MAGNESIUM CHLORIDE, SODIUM PHOSPHATE, DIBASIC, AND POTASSIUM PHOSPHATE .53; .5; .37; .037; .03; .012; .00082 G/100ML; G/100ML; G/100ML; G/100ML; G/100ML; G/100ML; G/100ML
100 INJECTION, SOLUTION INTRAVENOUS CONTINUOUS
Status: DISCONTINUED | OUTPATIENT
Start: 2021-02-07 | End: 2021-02-08

## 2021-02-07 RX ORDER — FLUTICASONE PROPIONATE 50 MCG
2 SPRAY, SUSPENSION (ML) NASAL DAILY
Status: DISCONTINUED | OUTPATIENT
Start: 2021-02-08 | End: 2021-02-11 | Stop reason: HOSPADM

## 2021-02-07 RX ORDER — MULTIVITAMIN/IRON/FOLIC ACID 18MG-0.4MG
1 TABLET ORAL DAILY
Status: DISCONTINUED | OUTPATIENT
Start: 2021-02-15 | End: 2021-02-11 | Stop reason: HOSPADM

## 2021-02-07 RX ORDER — LORATADINE 10 MG/1
10 TABLET ORAL DAILY
Status: DISCONTINUED | OUTPATIENT
Start: 2021-02-08 | End: 2021-02-11 | Stop reason: HOSPADM

## 2021-02-07 RX ORDER — ASCORBIC ACID 500 MG
1000 TABLET ORAL EVERY 12 HOURS SCHEDULED
Status: DISCONTINUED | OUTPATIENT
Start: 2021-02-07 | End: 2021-02-11 | Stop reason: HOSPADM

## 2021-02-07 RX ORDER — METHOCARBAMOL 500 MG/1
500 TABLET, FILM COATED ORAL
Status: DISCONTINUED | OUTPATIENT
Start: 2021-02-07 | End: 2021-02-11 | Stop reason: HOSPADM

## 2021-02-07 RX ADMIN — SODIUM CHLORIDE 1000 ML: 0.9 INJECTION, SOLUTION INTRAVENOUS at 14:27

## 2021-02-07 RX ADMIN — ACETAMINOPHEN 650 MG: 325 TABLET, FILM COATED ORAL at 14:35

## 2021-02-07 RX ADMIN — OXYCODONE HYDROCHLORIDE AND ACETAMINOPHEN 1000 MG: 500 TABLET ORAL at 21:08

## 2021-02-07 RX ADMIN — IOHEXOL 100 ML: 350 INJECTION, SOLUTION INTRAVENOUS at 15:43

## 2021-02-07 RX ADMIN — REMDESIVIR 200 MG: 100 INJECTION, POWDER, LYOPHILIZED, FOR SOLUTION INTRAVENOUS at 19:40

## 2021-02-07 RX ADMIN — METHOCARBAMOL 500 MG: 500 TABLET ORAL at 21:09

## 2021-02-07 RX ADMIN — SODIUM CHLORIDE, SODIUM GLUCONATE, SODIUM ACETATE, POTASSIUM CHLORIDE, MAGNESIUM CHLORIDE, SODIUM PHOSPHATE, DIBASIC, AND POTASSIUM PHOSPHATE 100 ML/HR: .53; .5; .37; .037; .03; .012; .00082 INJECTION, SOLUTION INTRAVENOUS at 20:26

## 2021-02-07 NOTE — ASSESSMENT & PLAN NOTE
Currently stable   Recently started on flonase and claritin by PCP    Plan:  -Continue flonase and claritin

## 2021-02-07 NOTE — ASSESSMENT & PLAN NOTE
Chronic neck pain described as stiffness/tightness x several months without any known injury or inciting event  Denies radiation of pain or weakness in his upper extremities  States the pain is primarily worse with turning his head  Recently started on meloxicam and robaxin for next pain, starting taking these medications 1 day PTA  Imaging:  CT cervical spine without any  fracture or traumatic subluxation  Straightening of the cervical lordosis could suggest component of muscle spasm  Small to moderate multilevel spondylosis  No high-grade neural impingement or significant change since 2014      Plan:  -Meloxicam held in the setting of CASE -> will restart at DC  -Continue robaxin  -Likely would benefit from neck stretches and possibly outpatient PT

## 2021-02-07 NOTE — ASSESSMENT & PLAN NOTE
CASE with Cr of 1 42 on admission (baseline Cr 1) likely in the setting of decreased PO intake x 2 days and possibly with component of obstruction as CT imaging showing nodular prostatic hypertrophy with median lobe nodules causing significant mass effect on the urinary bladder      Plan:  -Resolved  -S/p 1L bolus of NS in ED  -Completed IV fluids with isolyte 100cc/hr x 15 hrs   -Monitor BMP

## 2021-02-07 NOTE — ED NOTES
Pt's son states, "there's a couple of things  He has pain in his neck and his blood sugars have been high  I checked them several times over the past few days and they were 178, 205, 148  He saw his doctor for a cough yesterday   And I had covid 3 weeks ago "      Nataly Thomas, MATTI  02/07/21 2207

## 2021-02-07 NOTE — PROGRESS NOTES
INTERNAL MEDICINE RESIDENCY SENIOR ADMISSION NOTE     Name: Carson Johnson   Age & Sex: 78 y o  male   MRN: [id number]  Unit/Bed#: ED 15   Encounter: [id number]  Primary Care Provider: Dhaval Mireles PA-C    Admit to team: SOD Team A    Patient seen and examined  Reviewed H&P per Dr Viri Martinez    Agree with the assessment and plan with any exception/addition as noted below:    Agree with resident's note    Principal Problem:    Pneumonia due to COVID-19 virus  Active Problems:    Hematuria    CASE (acute kidney injury) ([facility] [address] )    Allergic rhinitis    Chronic cough    Neck pain      Code Status: Level 1 - Full Code  Admission Status: INPATIENT   Disposition: Patient requires Med/Surg  Expected Length of Stay: 2 days

## 2021-02-07 NOTE — ED ATTENDING ATTESTATION
2/7/2021  I, Isai Tavarez MD, saw and evaluated the patient  I have discussed the patient with the resident and agree with the resident's findings, Plan of Care, and MDM as documented in the resident's note, unless otherwise documented below  All available laboratory and imaging studies were reviewed by myself  I was present for key portions of any procedure(s) performed by the resident and I was immediately available to provide assistance  I agree with the current assessment done in the Emergency Department  I have conducted an independent evaluation of this patient  78 y o  male with PMH DM, HTN, presenting with generalized fatigue, mild nonproductive cough, dizziness and tremulousness, as well as hematuria  Patient reports that over the past 1-2 weeks, he has had increased fatigue  He has also had a mild nonproductive cough going on for close to 1 month  Patient reports good p o  Intake of both fluids and solids, however, it appears that he may have had some decreased p o  Intake according to patient's son  Patient notes bilateral cervical neck pain that has also been going on for the past week or so  On Thursday, 3 days ago, patient tripped and fell, striking his left forehead/eyebrow on the floor  He did not lose consciousness  He is not on blood thinners  His neck pain predates the fall  Today, patient noticed some blood in urine  Yesterday, his urine appeared darker and cloudy  He has also had increased urinary frequency  His blood glucose has been running higher than usual   Patient is accompanied by his son today  Patient's son had COVID-19 earlier this year, however, they had no contact with each other  Patient did travel to [place] via bus to visit with his sisters coming back earlier this week      ROS:  Constitutional: denies fevers, chills, reports generalized fatigue  Visual/Eyes: no changes in vision  HENT: no rhinorrhea, no sore throat  Cardiac: no chest pain, no lower extremity edema  Respiratory: no shortness of breath, reports cough as above  GI: no abdominal pain, nausea, vomiting, or diarrhea  Possible decreased p o  Intake  :  Urinary frequency, no burning with urination, some blood in urine today  Urine has been cloudy  Heme/Onc: no easy bruising  Endocrine:  History of diet-controlled diabetes  Neuro: no focal weakness or numbness, no headaches, neck pain as above  Neck pain is worsened with movement  Ten systems reviewed and negative unless otherwise noted in HPI and above      Physical Exam  Vitals:    02/07/21 1338   BP: 135/73   TempSrc: Oral   Pulse: 86   Resp: 20   Temp: 99 6 °F (37 6 °C)     SPO2 RA Rest      ED from 2/7/2021 in [address] [place] [address] Emergency Department   SpO2  94 %   SpO2 Activity  At Rest   O2 Device  None (Room air)   O2 Flow Rate  --        Constitutional:  Awake, alert, oriented  Appears fatigued and slightly diaphoretic, but nontoxic  HEENT:  Normocephalic, atraumatic  Sclera anicteric, conjunctiva not injected  Moist oral mucosa  Cardiac:  Regular rate and rhythm, no murmurs, rubs, or gallops  2+ radial pulses  Respiratory:  Lungs are clear to auscultation bilaterally, soft by basilar crackles are present posteriorly  Abdomen:  Nondistended  Bowel sounds present  No tenderness to palpation  No rebound or guarding  Musculoskeletal:  No deformities, no edema  Integument:  No rashes over exposed areas, cap refill less than 2 seconds  Neurologic:  Awake, alert, and oriented x3  Extraocular movements are intact  Nonfocal exam   Psychiatric:  Normal affect    Labs  Labs Reviewed   COVID19, INFLUENZA A/B, RSV PCR, [facility] - Abnormal       Result Value Ref Range Status    SARS-CoV-2 Positive (*) Negative Final    INFLUENZA A PCR Negative  Negative Final    INFLUENZA B PCR Negative  Negative Final    RSV PCR Negative  Negative Final    Narrative:       This test has been authorized by FDA under an EUA (Emergency Use Assay) for use by authorized laboratories  Clinical caution and judgement should be used with the interpretation of these results with consideration of the clinical impression and other laboratory testing  Testing reported as "Positive" or "Negative" has been proven to be accurate according to standard laboratory validation requirements  All testing is performed with control materials showing appropriate reactivity at standard intervals     URINE MICROSCOPIC - Abnormal    RBC, UA 20-30 (*) None Seen, 2-4 /hpf Final    WBC, UA 2-4  None Seen, 2-4 /hpf Final    Epithelial Cells None Seen  None Seen, Occasional /hpf Final    Bacteria, UA Occasional  None Seen, Occasional /hpf Final    COARSE GRANULAR CASTS 5-10  /lpf Final    AMORPH URATES Occasional  /hpf Final   CBC AND DIFFERENTIAL - Abnormal    WBC 4 80  4 31 - 10 16 Thousand/uL Final    RBC 5 47  3 88 - 5 62 Million/uL Final    Hemoglobin 14 0  12 0 - 17 0 g/dL Final    Hematocrit 43 7  36 5 - 49 3 % Final    MCV 80 (*) 82 - 98 fL Final    MCH 25 6 (*) 26 8 - 34 3 pg Final    MCHC 32 0  31 4 - 37 4 g/dL Final    RDW 12 8  11 6 - 15 1 % Final    MPV 10 9  8 9 - 12 7 fL Final    Platelets 429  733 - 390 Thousands/uL Final    nRBC 0  /100 WBCs Final    Neutrophils Relative 83 (*) 43 - 75 % Final    Immat GRANS % 0  0 - 2 % Final    Lymphocytes Relative 13 (*) 14 - 44 % Final    Monocytes Relative 4  4 - 12 % Final    Eosinophils Relative 0  0 - 6 % Final    Basophils Relative 0  0 - 1 % Final    Neutrophils Absolute 3 93  1 85 - 7 62 Thousands/µL Final    Immature Grans Absolute 0 02  0 00 - 0 20 Thousand/uL Final    Lymphocytes Absolute 0 64  0 60 - 4 47 Thousands/µL Final    Monocytes Absolute 0 21  0 17 - 1 22 Thousand/µL Final    Eosinophils Absolute 0 00  0 00 - 0 61 Thousand/µL Final    Basophils Absolute 0 00  0 00 - 0 10 Thousands/µL Final   COMPREHENSIVE METABOLIC PANEL - Abnormal    Sodium 140  136 - 145 mmol/L Final    Potassium 4 1  3 5 - 5 3 mmol/L Final    Chloride 105  100 - 108 mmol/L Final    CO2 29  21 - 32 mmol/L Final    ANION GAP 6  4 - 13 mmol/L Final    BUN 21  5 - 25 mg/dL Final    Creatinine 1 42 (*) 0 60 - 1 30 mg/dL Final    Comment: Standardized to IDMS reference method    Glucose 124  65 - 140 mg/dL Final    Comment: If the patient is fasting, the ADA then defines impaired fasting glucose as > 100 mg/dL and diabetes as > or equal to 123 mg/dL  Specimen collection should occur prior to Sulfasalazine administration due to the potential for falsely depressed results  Specimen collection should occur prior to Sulfapyridine administration due to the potential for falsely elevated results  Calcium 8 8  8 3 - 10 1 mg/dL Final    Corrected Calcium 9 4  8 3 - 10 1 mg/dL Final    AST 76 (*) 5 - 45 U/L Final    Comment: Specimen collection should occur prior to Sulfasalazine administration due to the potential for falsely depressed results  ALT 39  12 - 78 U/L Final    Comment: Specimen collection should occur prior to Sulfasalazine and/or Sulfapyridine administration due to the potential for falsely depressed results  Alkaline Phosphatase 37 (*) 46 - 116 U/L Final    Total Protein 8 6 (*) 6 4 - 8 2 g/dL Final    Albumin 3 3 (*) 3 5 - 5 0 g/dL Final    Total Bilirubin 0 62  0 20 - 1 00 mg/dL Final    Comment: Use of this assay is not recommended for patients undergoing treatment with eltrombopag due to the potential for falsely elevated results      eGFR 58  ml/min/1 73sq m Final    Narrative:     [place] guidelines for Chronic Kidney Disease (CKD):     Stage 1 with normal or high GFR (GFR > 90 mL/min/1 73 square meters)    Stage 2 Mild CKD (GFR = 60-89 mL/min/1 73 square meters)    Stage 3A Moderate CKD (GFR = 45-59 mL/min/1 73 square meters)    Stage 3B Moderate CKD (GFR = 30-44 mL/min/1 73 square meters)    Stage 4 Severe CKD (GFR = 15-29 mL/min/1 73 square meters)    Stage 5 End Stage CKD (GFR <15 mL/min/1 73 square meters)  Note: GFR calculation is accurate only with a steady state creatinine   CALCIUM, IONIZED - Abnormal    Calcium, Ionized 1 06 (*) 1 12 - 1 32 mmol/L Final   URINE MACROSCOPIC, POC - Abnormal    Color, UA Nathalia   Final    Clarity, UA Clear   Final    pH, UA 5 5  4 5 - 8 0 Final    Leukocytes, UA Negative  Negative Final    Nitrite, UA Negative  Negative Final    Protein, UA >=300 (*) Negative mg/dl Final    Glucose, UA Negative  Negative mg/dl Final    Ketones, UA Negative  Negative mg/dl Final    Urobilinogen, UA 0 2  0 2, 1 0 E U /dl E U /dl Final    Bilirubin, UA Negative  Negative Final    Blood, UA Large (*) Negative Final    Specific Gravity, UA >=1 030  1 003 - 1 030 Final    Narrative:     CLINITEK RESULT   TROPONIN I - Normal    Troponin I <0 02  <=0 04 ng/mL Final    Comment: Siemens Chemistry analyzer 99% cutoff is > 0 04 ng/mL in network labs     o cTnI 99% cutoff is useful only when applied to patients in the clinical setting of myocardial ischemia   o cTnI 99% cutoff should be interpreted in the context of clinical history, ECG findings and possibly cardiac imaging to establish correct diagnosis  o cTnI 99% cutoff may be suggestive but clearly not indicative of a coronary event without the clinical setting of myocardial ischemia  TSH, 3RD GENERATION WITH FREE T4 REFLEX - Normal    TSH 3RD GENERATON 2 400  0 358 - 3 740 uIU/mL Final    Narrative:     Patients undergoing fluorescein dye angiography may retain small amounts of fluorescein in the body for 48-72 hours post procedure  Samples containing fluorescein can produce falsely depressed TSH values  If the patient had this procedure,a specimen should be resubmitted post fluorescein clearance  Tests  CT chest abdomen pelvis w contrast   Final Result      1  Multifocal peripheral opacities through without lungs   In the setting of clinically suspected COVID-19, the above lung parenchymal findings on CT indicate intermediate to high confidence level for COVID-19 pneumonia  Parenchymal findings are moderate    in degree  2   No acute findings in the abdomen or pelvis  The study was marked in [facility] for immediate notification  Workstation performed: [id number]         CT head without contrast   Final Result      No acute intracranial abnormality  Workstation performed: [id number]         CT spine cervical without contrast   Final Result   Addendum 1 of 1   ADDENDUM:      Comparison made to cervical spine CT from 11/21/2014  Final          Procedures  ECG 12 Lead Documentation Only    Date/Time: 2/7/2021 2:24 PM  Performed by: Jennetta Dakins, MD  Authorized by: Jennetta Dakins, MD     Comments:      Normal sinus rhythm, ventricular rate 79, OK interval peers to be grossly within limits, QRS 1 4, QTC 4 3, left axis deviation, ST segment analysis is somewhat limited by wondering baseline, no obvious STEMI  Left axis deviation was present on EKG dated 04/17/2019  ED Course  Medications   acetaminophen (TYLENOL) tablet 650 mg (650 mg Oral Given 2/7/21 1435)   sodium chloride 0 9 % bolus 1,000 mL (1,000 mL Intravenous New Bag 2/7/21 1427)   iohexol (OMNIPAQUE) 350 MG/ML injection (MULTI-DOSE) 100 mL (100 mL Intravenous Given 2/7/21 1543)       78year old gentleman presenting with generalized weakness, decreased PO intake, headache, mild non-productive cough, and urinary frequency/ blood in urine  VS reviewed, afebrile, temp 99 6°, not tachycardic, saturating 94% on room air, within normal limits otherwise  Differential diagnosis is broad, including viral illness such as COVID-19 or influenza, bacterial infection such as urinary tract infection, pyelonephritis, sepsis, pneumonia, with other etiologies such as malignancy given decreased p o  intake and hematuria considered      EKG obtained, is reviewed by me, as above, somewhat limited by quality of the tracing, however, there is no obvious STEMI  Labs reveal CMP with creatinine of 1 42 concerning for acute kidney injury (prior creatinine 1 02), AST is mildly elevated but of unclear clinical significance, troponin x1 is within normal limits, CBCs without leukocytosis but with neutrophil predominance, there is no absolute lymphopenia  UA is with negative nitrites, but with large blood, and occasional bacteria  Overall, equivocal versus not consistent with urinary tract infection  Given the patient fell 3 days ago, CT head and CT C-spine obtained, revealing no acute intracranial abnormality, no cervical spine fracture or dislocation, other findings are as per formal report  Given cough, or planning on pursuing x-ray of chest, however, given hematuria, or pursuing CT of the abdomen and pelvis and will obtain CT of the chest as well  CT reveals multifocal peripheral opacities throughout the lungs concerning for COVID-19  Patient's COVID-19 PCR returns as positive  Of note, patient does have nodular prostatic hypertrophy with median lobe having mass effect on the urinary bladder  Patient may benefit from PSA and further evaluation  Given patient's generalized weakness, to the point where he is very tremulous even with sitting up during exam, we will admit the patient to the hospital for further therapies      Clinical Impression  Final diagnoses:   Hematuria   Pneumonia due to COVID-19 virus   CASE (acute kidney injury) ([facility] [address] )

## 2021-02-08 ENCOUNTER — TELEPHONE (OUTPATIENT)
Dept: UROLOGY | Facility: AMBULATORY SURGERY CENTER | Age: 71
End: 2021-02-08

## 2021-02-08 LAB
ABO GROUP BLD: NORMAL
ANION GAP SERPL CALCULATED.3IONS-SCNC: 5 MMOL/L (ref 4–13)
BLD GP AB SCN SERPL QL: NEGATIVE
BUN SERPL-MCNC: 19 MG/DL (ref 5–25)
CALCIUM SERPL-MCNC: 8.2 MG/DL (ref 8.3–10.1)
CHLORIDE SERPL-SCNC: 108 MMOL/L (ref 100–108)
CO2 SERPL-SCNC: 27 MMOL/L (ref 21–32)
CREAT SERPL-MCNC: 1.11 MG/DL (ref 0.6–1.3)
ERYTHROCYTE [DISTWIDTH] IN BLOOD BY AUTOMATED COUNT: 12.9 % (ref 11.6–15.1)
GFR SERPL CREATININE-BSD FRML MDRD: 78 ML/MIN/1.73SQ M
GLUCOSE SERPL-MCNC: 113 MG/DL (ref 65–140)
GLUCOSE SERPL-MCNC: 141 MG/DL (ref 65–140)
GLUCOSE SERPL-MCNC: 178 MG/DL (ref 65–140)
GLUCOSE SERPL-MCNC: 306 MG/DL (ref 65–140)
HCT VFR BLD AUTO: 37.9 % (ref 36.5–49.3)
HGB BLD-MCNC: 12.2 G/DL (ref 12–17)
MCH RBC QN AUTO: 25.4 PG (ref 26.8–34.3)
MCHC RBC AUTO-ENTMCNC: 32.2 G/DL (ref 31.4–37.4)
MCV RBC AUTO: 79 FL (ref 82–98)
PLATELET # BLD AUTO: 150 THOUSANDS/UL (ref 149–390)
PMV BLD AUTO: 11 FL (ref 8.9–12.7)
POTASSIUM SERPL-SCNC: 3.9 MMOL/L (ref 3.5–5.3)
PROCALCITONIN SERPL-MCNC: 0.49 NG/ML
RBC # BLD AUTO: 4.8 MILLION/UL (ref 3.88–5.62)
RH BLD: POSITIVE
SODIUM SERPL-SCNC: 140 MMOL/L (ref 136–145)
SPECIMEN EXPIRATION DATE: NORMAL
WBC # BLD AUTO: 3.6 THOUSAND/UL (ref 4.31–10.16)

## 2021-02-08 PROCEDURE — 84145 PROCALCITONIN (PCT): CPT | Performed by: STUDENT IN AN ORGANIZED HEALTH CARE EDUCATION/TRAINING PROGRAM

## 2021-02-08 PROCEDURE — 80048 BASIC METABOLIC PNL TOTAL CA: CPT | Performed by: STUDENT IN AN ORGANIZED HEALTH CARE EDUCATION/TRAINING PROGRAM

## 2021-02-08 PROCEDURE — 99221 1ST HOSP IP/OBS SF/LOW 40: CPT | Performed by: INTERNAL MEDICINE

## 2021-02-08 PROCEDURE — 86900 BLOOD TYPING SEROLOGIC ABO: CPT | Performed by: STUDENT IN AN ORGANIZED HEALTH CARE EDUCATION/TRAINING PROGRAM

## 2021-02-08 PROCEDURE — NC001 PR NO CHARGE: Performed by: INTERNAL MEDICINE

## 2021-02-08 PROCEDURE — 82948 REAGENT STRIP/BLOOD GLUCOSE: CPT

## 2021-02-08 PROCEDURE — 86850 RBC ANTIBODY SCREEN: CPT | Performed by: STUDENT IN AN ORGANIZED HEALTH CARE EDUCATION/TRAINING PROGRAM

## 2021-02-08 PROCEDURE — 99254 IP/OBS CNSLTJ NEW/EST MOD 60: CPT | Performed by: PHYSICIAN ASSISTANT

## 2021-02-08 PROCEDURE — 36415 COLL VENOUS BLD VENIPUNCTURE: CPT | Performed by: STUDENT IN AN ORGANIZED HEALTH CARE EDUCATION/TRAINING PROGRAM

## 2021-02-08 PROCEDURE — 86901 BLOOD TYPING SEROLOGIC RH(D): CPT | Performed by: STUDENT IN AN ORGANIZED HEALTH CARE EDUCATION/TRAINING PROGRAM

## 2021-02-08 PROCEDURE — 85027 COMPLETE CBC AUTOMATED: CPT | Performed by: STUDENT IN AN ORGANIZED HEALTH CARE EDUCATION/TRAINING PROGRAM

## 2021-02-08 RX ORDER — ACETAMINOPHEN 325 MG/1
650 TABLET ORAL EVERY 6 HOURS PRN
Status: DISCONTINUED | OUTPATIENT
Start: 2021-02-08 | End: 2021-02-11 | Stop reason: HOSPADM

## 2021-02-08 RX ORDER — DEXAMETHASONE SODIUM PHOSPHATE 4 MG/ML
6 INJECTION, SOLUTION INTRA-ARTICULAR; INTRALESIONAL; INTRAMUSCULAR; INTRAVENOUS; SOFT TISSUE EVERY 24 HOURS
Status: DISCONTINUED | OUTPATIENT
Start: 2021-02-08 | End: 2021-02-11 | Stop reason: HOSPADM

## 2021-02-08 RX ORDER — DOXYCYCLINE HYCLATE 100 MG/1
100 CAPSULE ORAL EVERY 12 HOURS SCHEDULED
Status: DISCONTINUED | OUTPATIENT
Start: 2021-02-08 | End: 2021-02-11 | Stop reason: HOSPADM

## 2021-02-08 RX ORDER — ATORVASTATIN CALCIUM 40 MG/1
40 TABLET, FILM COATED ORAL
Status: DISCONTINUED | OUTPATIENT
Start: 2021-02-08 | End: 2021-02-11 | Stop reason: HOSPADM

## 2021-02-08 RX ORDER — FAMOTIDINE 20 MG/1
20 TABLET, FILM COATED ORAL DAILY
Status: DISCONTINUED | OUTPATIENT
Start: 2021-02-08 | End: 2021-02-11 | Stop reason: HOSPADM

## 2021-02-08 RX ORDER — ACETAMINOPHEN 325 MG/1
650 TABLET ORAL EVERY 6 HOURS PRN
Status: DISCONTINUED | OUTPATIENT
Start: 2021-02-08 | End: 2021-02-08

## 2021-02-08 RX ADMIN — CEFTRIAXONE SODIUM 1000 MG: 10 INJECTION, POWDER, FOR SOLUTION INTRAVENOUS at 05:11

## 2021-02-08 RX ADMIN — OXYCODONE HYDROCHLORIDE AND ACETAMINOPHEN 1000 MG: 500 TABLET ORAL at 08:37

## 2021-02-08 RX ADMIN — DEXAMETHASONE SODIUM PHOSPHATE 6 MG: 4 INJECTION, SOLUTION INTRA-ARTICULAR; INTRALESIONAL; INTRAMUSCULAR; INTRAVENOUS; SOFT TISSUE at 05:11

## 2021-02-08 RX ADMIN — Medication 2000 UNITS: at 08:37

## 2021-02-08 RX ADMIN — ENOXAPARIN SODIUM 80 MG: 80 INJECTION SUBCUTANEOUS at 20:58

## 2021-02-08 RX ADMIN — ZINC SULFATE 220 MG (50 MG) CAPSULE 220 MG: CAPSULE at 08:38

## 2021-02-08 RX ADMIN — METHOCARBAMOL 500 MG: 500 TABLET ORAL at 21:00

## 2021-02-08 RX ADMIN — REMDESIVIR 100 MG: 100 INJECTION, POWDER, LYOPHILIZED, FOR SOLUTION INTRAVENOUS at 20:58

## 2021-02-08 RX ADMIN — DOXYCYCLINE 100 MG: 100 CAPSULE ORAL at 20:58

## 2021-02-08 RX ADMIN — INSULIN LISPRO 4 UNITS: 100 INJECTION, SOLUTION INTRAVENOUS; SUBCUTANEOUS at 12:00

## 2021-02-08 RX ADMIN — LORATADINE 10 MG: 10 TABLET ORAL at 08:38

## 2021-02-08 RX ADMIN — ENOXAPARIN SODIUM 80 MG: 80 INJECTION SUBCUTANEOUS at 08:40

## 2021-02-08 RX ADMIN — INSULIN LISPRO 1 UNITS: 100 INJECTION, SOLUTION INTRAVENOUS; SUBCUTANEOUS at 17:14

## 2021-02-08 RX ADMIN — OXYCODONE HYDROCHLORIDE AND ACETAMINOPHEN 1000 MG: 500 TABLET ORAL at 20:59

## 2021-02-08 RX ADMIN — ACETAMINOPHEN 650 MG: 325 TABLET, FILM COATED ORAL at 04:01

## 2021-02-08 RX ADMIN — FLUTICASONE PROPIONATE 2 SPRAY: 50 SPRAY, METERED NASAL at 08:38

## 2021-02-08 RX ADMIN — FAMOTIDINE 20 MG: 20 TABLET ORAL at 08:38

## 2021-02-08 RX ADMIN — ATORVASTATIN CALCIUM 40 MG: 40 TABLET, FILM COATED ORAL at 17:15

## 2021-02-08 RX ADMIN — DOXYCYCLINE 100 MG: 100 CAPSULE ORAL at 08:38

## 2021-02-08 NOTE — CONSULTS
[address] [facility] [address] NOTE   Admission Date: 2/7/2021    Patient Identifiers: Angy Walker (MRN: [id number])  Service Requesting Consultation: Kate Selby MD  Service Providing Consultation:  Urology, Apple Meyer PA-C  Consults  Date of Service: 2/8/2021    Reason for Consultation:  Gross hematuria    History of Present Illness:     Angy Walker is a 78 y o  male with no prior urologic problems presented with worsening cough x1 week fatigue dizziness and tremors  He had an episode of gross hematuria  He had no flank pain suprapubic pain or prior history of hematuria  He denies any recent trauma  CT scan abdomen and pelvis was unremarkable showing no cause for blood in his urine  Creatinine 1 11   H&H 12 2 and 37 9  His urine at the bedside is clear yellow  Past Medical, Past Surgical History:     Past Medical History:   Diagnosis Date    Prediabetes    :    History reviewed   No pertinent surgical history :    Medications, Allergies:     Current Facility-Administered Medications:     acetaminophen (TYLENOL) tablet 650 mg, 650 mg, Oral, Q6H PRN, Yazmin Thomas MD    ascorbic acid (VITAMIN C) tablet 1,000 mg, 1,000 mg, Oral, Q12H [address], Moon Lui MD, 1,000 mg at 02/08/21 0837    atorvastatin (LIPITOR) tablet 40 mg, 40 mg, Oral, Daily With Miles Jenkins MD    benzonatate (TESSALON PERLES) capsule 200 mg, 200 mg, Oral, TID PRN, Moon Lui MD    ceftriaxone (ROCEPHIN) 1 g/50 mL in dextrose IVPB, 1,000 mg, Intravenous, Q24H, Indiana Kinney MD, Last Rate: 100 mL/hr at 02/08/21 0511, 1,000 mg at 02/08/21 0511    cholecalciferol (VITAMIN D3) tablet 2,000 Units, 2,000 Units, Oral, Daily, Moon Lui MD, 2,000 Units at 02/08/21 0837    dexamethasone (DECADRON) injection 6 mg, 6 mg, Intravenous, Q24H, Yazmin Thomas MD, 6 mg at 02/08/21 0511    doxycycline hyclate (VIBRAMYCIN) capsule 100 mg, 100 mg, Oral, Q12H [address], Indiana Kinney MD, 100 mg at 02/08/21 0838    famotidine (PEPCID) tablet 20 mg, 20 mg, Oral, Daily, Papo Rowland MD, 20 mg at 02/08/21 0838    fluticasone (FLONASE) 50 mcg/act nasal spray 2 spray, 2 spray, Nasal, Daily, Andre Zarate MD, 2 spray at 02/08/21 8632    insulin lispro (HumaLOG) 100 units/mL subcutaneous injection 1-6 Units, 1-6 Units, Subcutaneous, TID AC **AND** Fingerstick Glucose (POCT), , , TID AC, Andre Zarate MD    loratadine (CLARITIN) tablet 10 mg, 10 mg, Oral, Daily, Andre Zarate MD, 10 mg at 02/08/21 9609    methocarbamol (ROBAXIN) tablet 500 mg, 500 mg, Oral, HS, Andre Zarate MD, 500 mg at 02/07/21 2109    zinc sulfate (ZINCATE) capsule 220 mg, 220 mg, Oral, Daily, 220 mg at 02/08/21 0838 **FOLLOWED BY** [START ON 2/15/2021] multivitamin-minerals (CENTRUM ADULTS) tablet 1 tablet, 1 tablet, Oral, Daily, Andre Zarate MD    [COMPLETED] remdesivir Unrulyviji Cohen) 200 mg in sodium chloride 0 9 % 250 mL IVPB, 200 mg, Intravenous, Q24H, Stopped at 02/07/21 2015 **FOLLOWED BY** remdesivir (Veklury) 100 mg in sodium chloride 0 9 % 250 mL IVPB, 100 mg, Intravenous, Q24H, Andre Zarate MD    Current Outpatient Medications:     benzonatate (TESSALON) 200 MG capsule, Take 1 capsule (200 mg total) by mouth 3 (three) times a day as needed for cough, Disp: 30 capsule, Rfl: 1    Blood Pressure Monitoring (BLOOD PRESSURE MONITOR/M CUFF) MISC, by Does not apply route daily (Patient not taking: Reported on 2/5/2021), Disp: 1 each, Rfl: 0    fluticasone (FLONASE) 50 mcg/act nasal spray, 2 sprays into each nostril daily, Disp: 1 Bottle, Rfl: 0    ketoconazole (NIZORAL) 2 % cream, Apply topically daily (Patient not taking: Reported on 2/5/2021), Disp: 15 g, Rfl: 2    loratadine (CLARITIN) 10 mg tablet, Take 1 tablet (10 mg total) by mouth daily, Disp: 30 tablet, Rfl: 0    meloxicam (MOBIC) 15 mg tablet, Take 1 tablet (15 mg total) by mouth daily In the morning with food for neck pain, Disp: 30 tablet, Rfl: 0   methocarbamol (ROBAXIN) 500 mg tablet, Take 1 tablet (500 mg total) by mouth daily at bedtime For neck pain, Disp: 30 tablet, Rfl: 0    Allergies:  No Known Allergies:    Social and Family History:   Social History:   Social History     Tobacco Use    Smoking status: Never Smoker    Smokeless tobacco: Never Used   Substance Use Topics    Alcohol use: No    Drug use: No        Social History     Tobacco Use   Smoking Status Never Smoker   Smokeless Tobacco Never Used       Family History:  Family History   Problem Relation Age of Onset    No Known Problems Mother     No Known Problems Father     Diabetes Sister     Diabetes Brother     No Known Problems Son     No Known Problems Daughter     No Known Problems Sister     No Known Problems Sister     No Known Problems Sister     No Known Problems Sister     No Known Problems Brother     No Known Problems Brother     No Known Problems Brother     No Known Problems Son     No Known Problems Son     No Known Problems Daughter    :     Review of Systems:     General: Fever, chills, or night sweats: negative  Cardiac: Negative for chest pain  Pulmonary: Negative for shortness of breath  Gastrointestinal: Abdominal pain negative  Nausea, vomiting, or diarrhea negative,  Genitourinary: See HPI above  Patient does not have hematuria  All other systems queried were negative  Physical Exam:   General: Patient is pleasant and in NAD  Awake and alert  /66 (BP Location: Right arm)   Pulse 72   Temp 98 9 °F (37 2 °C) (Oral)   Resp 16   SpO2 97%   HEENT:  Constitutional:  pleasant and cooperative     no apparent distress  Cardiac: Peripheral edema: negative  Pulmonary: Non-labored breathing  Abdomen: Soft, non-tender, non-distended  No surgical scars  No masses, tenderness, hernias noted  Genitourinary: Negative CVA tenderness, negative suprapubic tenderness  Extremities:  Neurological:CNII-XII intact  No numbness or tingling   Essentially non focal neurologic exam  Psychiatric:mood affect and behavior normal      Labs:     Lab Results   Component Value Date    HGB 12 2 02/08/2021    HCT 37 9 02/08/2021    WBC 3 60 (L) 02/08/2021     02/08/2021   ]    Lab Results   Component Value Date    K 3 9 02/08/2021     02/08/2021    CO2 27 02/08/2021    BUN 19 02/08/2021    CREATININE 1 11 02/08/2021    CALCIUM 8 2 (L) 02/08/2021   ]    Imaging:   I personally reviewed the images and report of the following studies, and reviewed them with the patient:  CT CHEST, ABDOMEN AND PELVIS WITH IV CONTRAST   IMPRESSION:     1   Multifocal peripheral opacities through without lungs  In the setting of clinically suspected COVID-19, the above lung parenchymal findings on CT indicate intermediate to high confidence level for COVID-19 pneumonia  Parenchymal findings are moderate   in degree  2   No acute findings in the abdomen or pelvis      ASSESSMENT:   1  Gross hematuria  2  COVID-19 pneumonia  3  Acute kidney injury-resolved    PLAN:   -urine has cleared with only microscopic blood  -will arrange for outpatient follow-up and cystoscopy in the office      Thank you for allowing me to participate in this patients care  Please do not hesitate to call with any additional questions    Tatyana Doe PA-C

## 2021-02-08 NOTE — ASSESSMENT & PLAN NOTE
Pt with hx of chronic cough likely from post nasal drip, cough worsening this past week with increased fatigue, lightheadedness and decreased PO intake  Tested positive for COVID 19 on 02/07 with CT chest remarkable for multifocal peripheral opacities through without lungs consistent with COVID 19 pneumonia   Afebrile and hemodynamically stable on exam with SpO2 of 94% on RA, except for mild tachypnea    Risk factors: age >71, DM2    Plan:  - Plasma and Blood type & screen ordered 2/8  - Cardiac markers:Troponin <0 02, , BNP 9  - Inflammatory markers: CRP 97 2, D-Dimer 5 46, ferritin 966  - Vit D3 2000 IU daily, Vitamin C 1 g PO Q12H, and Zinc 220 mg PO daily x 7 days, followed by multivitamin  - Patient currently on 2 L nasal cannula -> continue to wean  - Continue famotidine and dexamethasone  - Received Remdesivir 200mg IV x 1 on 2/7, followed by 100mg IV daily x 4 days  - Spoke with Urology and restarted Lovenox 2/8  - OOB TID, ambulation & mobilization encouraged  - Encourage incentive spirometry use  - Home O2 eval ordered -> requires 3L O2  - Lower extremity VAS doppler ordered -> if negative will order CTA to rule out PE  - Plan for possible DC tomorrow if patient is stable

## 2021-02-08 NOTE — ASSESSMENT & PLAN NOTE
Lab Results   Component Value Date    HGBA1C 6 9 (H) 02/07/2021       Recent Labs     02/09/21  1608 02/09/21  2132 02/10/21  0712 02/10/21  1125   POCGLU 198* 197* 154* 253*       Blood Sugar Average: Last 72 hrs:  (P) 216 [id number]   Plan:  -Continue SSI and monitor BGL

## 2021-02-08 NOTE — PROGRESS NOTES
INTERNAL MEDICINE RESIDENCY PROGRESS NOTE     PATIENT INFORMATION     Name: Isabel Titus   Age & Sex: 78 y o  male   MRN: [id number]  Hospital Stay Days: 1  Unit/Bed#: ED 15   Encounter: [id number]  Team: SOD Team A    ASSESSMENT/PLAN     Principal Problem:    Pneumonia due to COVID-19 virus  Active Problems:    Hematuria    CASE (acute kidney injury) ([facility] )    Allergic rhinitis    Chronic cough    Neck pain    DM (diabetes mellitus), type 2 ([place] [address] )    DM (diabetes mellitus), type 2 ([place] [address] )  Assessment & Plan  Lab Results   Component Value Date    HGBA1C 6 9 (H) 02/07/2021       Recent Labs     02/08/21  0705 02/08/21  1143   POCGLU 141* 306*       Blood Sugar Average: Last 72 hrs:  (P) 223 5   Plan:  -Continue SSI and monitor BGL    Neck pain  Assessment & Plan  Chronic neck pain described as stiffness/tightness x several months without any known injury or inciting event  Denies radiation of pain or weakness in his upper extremities  States the pain is primarily worse with turning his head  Recently started on meloxicam and robaxin for next pain, starting taking these medications 1 day PTA  Imaging:  CT cervical spine without any  fracture or traumatic subluxation  Straightening of the cervical lordosis could suggest component of muscle spasm  Small to moderate multilevel spondylosis  No high-grade neural impingement or significant change since 2014  Plan:  -Meloxicam currently held in the setting of CASE  -Will continue robaxin  -Likely would benefit from neck stretches and possibly outpatient PT    Chronic cough  Assessment & Plan  Likely in the setting of post-nasal drip    Plan:  -Continue flonase and claritin    Allergic rhinitis  Assessment & Plan  Currently stable   Recently started on flonase and claritin by PCP    Plan:  -Continue flonase and claritin      CASE (acute kidney injury) ([facility] )  Assessment & Plan  CASE with Cr of 1 42 on admission (baseline Cr 1) likely in the setting of decreased PO intake x 2 days and possibly with component of obstruction as CT imaging showing nodular prostatic hypertrophy with median lobe nodules causing significant mass effect on the urinary bladder    Plan:  -Resolved -> Cr 1 11 today   -S/p 1L bolus of NS in ED  -IV fluids with isolyte 100cc/hr x 15 hrs -> completed  -Monitor BMP  -Urology following       Hematuria  Assessment & Plan  Episode of painless hematuria noted in the morning 2/7, not associated with fever, chills, dysuria, suprapubic or back pain     UA with figueroa colored urine with protein and large blood, no signs of infection  Hematuria possibly related to BPH vs COVID 19 infection    Plan:  -Urine was clear today  -Urology consulted -> will arrange outpatient follow up and future cystoscopy   -Hb was 14 on admission -> today 12 2 (received 1 L fluids in the ED)  -Continue to monitor Hb and for signs of hematuria    * Pneumonia due to COVID-19 virus  Assessment & Plan  Pt with hx of chronic cough likely from post nasal drip, cough worsening this past week with increased fatigue, lightheadedness and decreased PO intake  Tested positive for COVID 19 on 02/07 with CT chest remarkable for multifocal peripheral opacities through without lungs consistent with COVID 19 pneumonia   Afebrile and hemodynamically stable on exam with SpO2 of 94% on RA, except for mild tachypnea    Risk factors: age >71, DM2    Plan:  - Plasma ordered today  - Blood type & screen ordered  - Cardiac markers:Troponin <0 02, , BNP 9  - Check inflammatory markers: CRP 97 2, D-Dimer 5 46, ferritin 966  - Vit D3 2000 IU daily, Vitamin C 1 g PO Q12H, and Zinc 220 mg PO daily x 7 days, followed by multivitamin  - Patient currently on 4 L nasal cannula  - Continue famotidine and dexamethasone  - Received Remdesivir 200mg IV x 1 on 2/7, followed by 100mg IV daily x 4 days  - Spoke with Urology and okay to restart Lovenox in setting of hematuria  - OOB TID, ambulation & mobilization encouraged  - Encourage incentive spirometry use      Disposition: Inpatient     SUBJECTIVE     Patient seen and examined  No acute events overnight  Overall the patient states that he feels well  He reports that his cough and shortness of breath have improved compared to yesterday  He reports that he has not seen any blood in his urine since yesterday  Aside from this he denies any other acute complaints  OBJECTIVE     Vitals:    21 0345 21 0513 21 0630 21 1100   BP: 131/60  124/66 126/66   BP Location: Right arm  Right arm Right arm   Pulse: 76  72 62   Resp: 16  16 17   Temp: (!) 102 7 °F (39 3 °C) 98 9 °F (37 2 °C)     TempSrc: Oral Oral     SpO2: 93%  97% 98%      Temperature:   Temp (24hrs), Av 4 °F (38 °C), Min:98 9 °F (37 2 °C), Max:102 7 °F (39 3 °C)    Temperature: 98 9 °F (37 2 °C)  Intake & Output:  I/O        07 -  07 07 -  07 07 -  0700    IV Piggyback  250     Total Intake  250     Net  +250                  Intake/Output Summary (Last 24 hours) at 2021 1238  Last data filed at 2021 3685  Gross per 24 hour   Intake 450 ml   Output --   Net 450 ml     I/O this shift:  In: 200 [I V :200]  Out: -   Weights: There is no height or weight on file to calculate BMI  Weight (last 2 days)     None        Physical Exam  Constitutional:       Appearance: He is well-developed  HENT:      Head: Normocephalic and atraumatic  Nose: Nose normal    Eyes:      General:         Right eye: No discharge  Left eye: No discharge  Conjunctiva/sclera: Conjunctivae normal       Pupils: Pupils are equal, round, and reactive to light  Neck:      Thyroid: No thyromegaly  Cardiovascular:      Rate and Rhythm: Normal rate and regular rhythm  Heart sounds: Normal heart sounds  No murmur  No friction rub  No gallop  Pulmonary:      Effort: Pulmonary effort is normal  No respiratory distress  Breath sounds: No stridor  Rales present  No wheezing  Chest:      Chest wall: No tenderness  Abdominal:      General: Bowel sounds are normal  There is no distension  Palpations: Abdomen is soft  There is no mass  Tenderness: There is no abdominal tenderness  There is no guarding or rebound  Musculoskeletal:         General: No tenderness or deformity  Right lower leg: No edema  Left lower leg: No edema  Lymphadenopathy:      Cervical: No cervical adenopathy  Skin:     General: Skin is warm and dry  Neurological:      Mental Status: He is alert and oriented to person, place, and time  Psychiatric:         Mood and Affect: Mood normal          Behavior: Behavior normal          Thought Content: Thought content normal          Judgment: Judgment normal         LABORATORY DATA     Labs: I have personally reviewed pertinent reports  Results from last 7 days   Lab Units 02/08/21  0513 02/07/21  1424   WBC Thousand/uL 3 60* 4 80   HEMOGLOBIN g/dL 12 2 14 0   HEMATOCRIT % 37 9 43 7   PLATELETS Thousands/uL 150 151   NEUTROS PCT %  --  83*   MONOS PCT %  --  4      Results from last 7 days   Lab Units 02/08/21  0513 02/07/21  1424   POTASSIUM mmol/L 3 9 4 1   CHLORIDE mmol/L 108 105   CO2 mmol/L 27 29   BUN mg/dL 19 21   CREATININE mg/dL 1 11 1 42*   CALCIUM mg/dL 8 2* 8 8   ALK PHOS U/L  --  37*   ALT U/L  --  39   AST U/L  --  76*     Serum creatinine: 1 11 mg/dL 02/08/21 0513  Estimated creatinine clearance: 62 4 mL/min                Results from last 7 days   Lab Units 02/07/21  1938   LACTIC ACID mmol/L 0 9     Results from last 7 days   Lab Units 02/07/21  1424   TROPONIN I ng/mL <0 02       IMAGING & DIAGNOSTIC TESTING     Radiology Results: I have personally reviewed pertinent reports  Ct Head Without Contrast    Result Date: 2/7/2021  Impression: No acute intracranial abnormality   Workstation performed: [id number]     Ct Spine Cervical Without Contrast    Addendum Date: 2/7/2021    ADDENDUM: Comparison made to cervical spine CT from 11/21/2014  Result Date: 2/7/2021  Impression: 1  No cervical spine fracture or traumatic subluxation  2   Straightening of the cervical lordosis could suggest component of muscle spasm  3   Small to moderate multilevel spondylosis  No high-grade neural impingement or significant change since 2014  Workstation performed: [id number]     Ct Chest Abdomen Pelvis W Contrast    Result Date: 2/7/2021  Impression: 1  Multifocal peripheral opacities through without lungs  In the setting of clinically suspected COVID-19, the above lung parenchymal findings on CT indicate intermediate to high confidence level for COVID-19 pneumonia  Parenchymal findings are moderate  in degree  2   No acute findings in the abdomen or pelvis  The study was marked in [facility] for immediate notification  Workstation performed: [id number]     Other Diagnostic Testing: I have personally reviewed pertinent reports        ACTIVE MEDICATIONS     Current Facility-Administered Medications   Medication Dose Route Frequency    acetaminophen (TYLENOL) tablet 650 mg  650 mg Oral Q6H PRN    ascorbic acid (VITAMIN C) tablet 1,000 mg  1,000 mg Oral Q12H [facility]    atorvastatin (LIPITOR) tablet 40 mg  40 mg Oral Daily With Dinner    benzonatate (TESSALON PERLES) capsule 200 mg  200 mg Oral TID PRN    ceftriaxone (ROCEPHIN) 1 g/50 mL in dextrose IVPB  1,000 mg Intravenous Q24H    cholecalciferol (VITAMIN D3) tablet 2,000 Units  2,000 Units Oral Daily    dexamethasone (DECADRON) injection 6 mg  6 mg Intravenous Q24H    doxycycline hyclate (VIBRAMYCIN) capsule 100 mg  100 mg Oral Q12H [facility]    enoxaparin (LOVENOX) subcutaneous injection 80 mg  1 mg/kg Subcutaneous Q12H [facility]    famotidine (PEPCID) tablet 20 mg  20 mg Oral Daily    fluticasone (FLONASE) 50 mcg/act nasal spray 2 spray  2 spray Nasal Daily    insulin lispro (HumaLOG) 100 units/mL subcutaneous injection 1-6 Units  1-6 Units Subcutaneous TID AC    loratadine (CLARITIN) tablet 10 mg  10 mg Oral Daily    methocarbamol (ROBAXIN) tablet 500 mg  500 mg Oral HS    zinc sulfate (ZINCATE) capsule 220 mg  220 mg Oral Daily    Followed by   Venita Comer ON 2/15/2021] multivitamin-minerals (CENTRUM ADULTS) tablet 1 tablet  1 tablet Oral Daily    remdesivir (Veklury) 100 mg in sodium chloride 0 9 % 250 mL IVPB  100 mg Intravenous Q24H     VTE Pharmacologic Prophylaxis:  Lovenox   VTE Mechanical Prophylaxis: sequential compression device    ==  Lola Catalan MD  IM Resident, PGY-1  [facility]

## 2021-02-08 NOTE — H&P
INTERNAL MEDICINE RESIDENCY ADMISSION H&P     Name: Johny Crook   Age & Sex: 78 y o  male   MRN: [id number]  Unit/Bed#: ED 15   Encounter: [id number]  Primary Care Provider: Shakir Pierre PA-C    Code Status: Level 1 - Full Code  Admission Status: INPATIENT   Disposition: Patient requires Med/Surg    Admit to team: SOD Team A    ASSESSMENT/PLAN     Principal Problem:    Pneumonia due to COVID-19 virus  Active Problems:    CASE (acute kidney injury) ([place] [address] )    Hematuria    Allergic rhinitis    Chronic cough    Neck pain    DM (diabetes mellitus), type 2 ([place] [address] )      * Pneumonia due to COVID-19 virus  Assessment & Plan  Pt with hx of chronic cough likely from post nasal drip, cough worsening this past week with increased fatigue, lightheadedness and decreased PO intake  Tested positive for COVID 19 on 02/07 with CT chest remarkable for multifocal peripheral opacities through without lungs consistent with COVID 19 pneumonia   Afebrile and hemodynamically stable on exam with SpO2 of 94% on RA, except for mild tachypnea    Risk factors: age >71, DM2    Plan:  - Check blood type  - Check cardiac markers:Troponin, CK, BNP  - Check inflammatory markers: CRP, D-Dimer, ferritin  - Vit D3 2000 IU daily, Vitamin C 1 g PO Q12H, and Zinc 220 mg PO daily x 7 days, followed by multivitamen  - Will hold off on famotidine and dexamethasone as pt is not requiring supplemental oxygen   - Will start Remdesivir 200mg IV x 1, followed by 100mg IV daily x 4 days  - OOB TID, ambulation & mobilization encouraged  - Encourage incentive spirometry use    CASE (acute kidney injury) ([place] [address] )  Assessment & Plan  CASE with Cr of 1 42 on admission (baseline Cr 1) likely in the setting of decreased PO intake x 2 days and possibly with component of obstruction as CT imaging showing nodular prostatic hypertrophy with median lobe nodules causing significant mass effect on the urinary bladder    Plan:  -S/p 1L bolus of NS in ED  -Will continue IV fluids with isolyte 100cc/hr x 15 hrs  -Monitor BMP  -Urology consulted       Hematuria  Assessment & Plan  Episode of painless hematuria noted in the morning, not associated with fever, chills, dysuria, suprapubic or back pain  UA with figueroa colored urine with protein and large blood, no signs of infection  Hematuria possibly related to BPH vs COVID 19 infection    Plan:  -Urology consulted  -Will check CK to r/o rhabdo in the setting of fall at home  -Monitor Hg, currently stable at 14    DM (diabetes mellitus), type 2 ([facility] [address] )  Assessment & Plan  Lab Results   Component Value Date    HGBA1C 6 6 (H) 05/07/2020       No results for input(s): POCGLU in the last 72 hours  Blood Sugar Average: Last 72 hrs:     Plan:  -Will initiate SSI and monitor BGL  -Recheck A1C    Neck pain  Assessment & Plan  Chronic neck pain described as stiffness/tightness x several months without any known injury or inciting event  Denies radiation of pain or weakness in his upper extremities  States the pain is primarily worse with turning his head  Recently started on meloxicam and robaxin for next pain, starting taking these medications 1 day PTA  Imaging:  CT cervical spine without any  fracture or traumatic subluxation  Straightening of the cervical lordosis could suggest component of muscle spasm  Small to moderate multilevel spondylosis  No high-grade neural impingement or significant change since 2014  Plan:  -Will hold meloxicam in the setting of CASE  -Will continue robaxin  -Likely would benefit from neck stretches and possibly outpatient PT    Chronic cough  Assessment & Plan  Likely in the setting of post-nasal drip    Plan:  -Continue flonase and claritin    Allergic rhinitis  Assessment & Plan  Currently stable   Recently started on flonase and claritin by PCP    Plan:  -Continue flonase and claritin        VTE Pharmacologic Prophylaxis: holding heparin in the setting of hematuria  VTE Mechanical Prophylaxis: sequential compression device    CHIEF COMPLAINT     Chief Complaint   Patient presents with    Blood in Urine     pt presents ambulatory with c/o hematuria since this morning  denies dysuria/flank pain, reports "sometimes" suprapubic pain, +urinary urgency      HISTORY OF PRESENT ILLNESS     Tawana Jasso is a 77 yo M with hx of DM2 who presented to [facility] on 02/07 with complaints of worsening cough x 1 week associated with generalized fatigue, dizziness and tremulousness  States he has had a cough for several months now, but it increased in frequency this week  Pt is having productive cough with white sputum  No fever, chills, CP, or SOB  No smoking hx  Pt reports good PO intake of both fluids and solids, however, son at bedside disagrees and states he has not been eating well recently  Pt also endorses episode of hematuria early this morning with robin blood with clots noted during urination  Denies any pain associated with this, including any suprapubic or flank pain  No prior hx of hematuria  Denies recent trauma, however, he did trip and fall approximately 3 days ago striking his L forehead on the floor  No LOC  Not on blood thinner  He is complaining of neck pain which has been ongoing prior to this fall event  He was started on meloxicam and robaxin for his neck pain 2 days ago by PCP which has provided some relief  Patient was accompanied to the ED by his son  Patient's son had [phone] earlier this year, however, they had no contact with each other at that time  Of note, patient did travel to [place] via bus to visit with his sisters earlier this week  In ED, afebrile and hemodynamically stable on RA, except for mild tachypnea  Labwork notable for CASE with Cr of 1 42  UA with large blood and protein, however, Hg stable at 14  COVID 19 positive   CT chest/abdomen/pelvis showing multifocal peripheral opacities through without lungs consistent with COVID 19 pneumonia as well as nodular prostatic hypertrophy causing significant mass effect on the urinary bladder  CT head was negative trauma or any acute pathology  Pt admitted under mild COVID 23 pathway for CASE and hematuria  REVIEW OF SYSTEMS     Review of Systems   Constitutional: Positive for fatigue  Negative for chills, diaphoresis and fever  Respiratory: Positive for cough  Negative for chest tightness, shortness of breath and wheezing  Cardiovascular: Negative for chest pain, palpitations and leg swelling  Gastrointestinal: Negative for abdominal pain, diarrhea, nausea and vomiting  Genitourinary: Positive for hematuria  Negative for dysuria and flank pain  Musculoskeletal: Positive for neck pain  Negative for back pain and joint swelling  Neurological: Negative for dizziness, syncope, light-headedness and headaches  Psychiatric/Behavioral: Negative for confusion  OBJECTIVE     Vitals:    21 1500 21 1600 21 1700 21 1730   BP: 116/67 117/63 114/65 118/69   Pulse: 74 74 68 72   Resp: 20 22 (!) 32 (!) 32   Temp:       TempSrc:       SpO2: 93% 95% 97% 97%      Temperature:   Temp (24hrs), Av 6 °F (37 6 °C), Min:99 6 °F (37 6 °C), Max:99 6 °F (37 6 °C)    Temperature: 99 6 °F (37 6 °C)  Intake & Output:  I/O     None        Weights: There is no height or weight on file to calculate BMI  Weight (last 2 days)     None        Physical Exam  Constitutional:       General: He is not in acute distress  HENT:      Head: Normocephalic and atraumatic  Mouth/Throat:      Mouth: Mucous membranes are moist    Eyes:      Conjunctiva/sclera: Conjunctivae normal       Pupils: Pupils are equal, round, and reactive to light  Cardiovascular:      Rate and Rhythm: Normal rate and regular rhythm  Heart sounds: Normal heart sounds  Pulmonary:      Effort: Pulmonary effort is normal  No respiratory distress  Breath sounds: Rales present  Abdominal:      Palpations: Abdomen is soft  Tenderness:  There is no abdominal tenderness  There is no right CVA tenderness or left CVA tenderness  Musculoskeletal:      Right lower leg: No edema  Left lower leg: No edema  Skin:     General: Skin is warm  Neurological:      General: No focal deficit present  Mental Status: He is alert and oriented to person, place, and time  PAST MEDICAL HISTORY     Past Medical History:   Diagnosis Date    Prediabetes      PAST SURGICAL HISTORY   History reviewed  No pertinent surgical history  SOCIAL & FAMILY HISTORY     Social History     Substance and Sexual Activity   Alcohol Use No     Substance and Sexual Activity   Alcohol Use No        Substance and Sexual Activity   Drug Use No     Social History     Tobacco Use   Smoking Status Never Smoker   Smokeless Tobacco Never Used     Family History   Problem Relation Age of Onset    No Known Problems Mother     No Known Problems Father     Diabetes Sister     Diabetes Brother     No Known Problems Son     No Known Problems Daughter     No Known Problems Sister     No Known Problems Sister     No Known Problems Sister     No Known Problems Sister     No Known Problems Brother     No Known Problems Brother     No Known Problems Brother     No Known Problems Son     No Known Problems Son     No Known Problems Daughter      LABORATORY DATA     Labs: I have personally reviewed pertinent reports      Results from last 7 days   Lab Units 02/07/21  1424   WBC Thousand/uL 4 80   HEMOGLOBIN g/dL 14 0   HEMATOCRIT % 43 7   PLATELETS Thousands/uL 151   NEUTROS PCT % 83*   MONOS PCT % 4      Results from last 7 days   Lab Units 02/07/21  1424   POTASSIUM mmol/L 4 1   CHLORIDE mmol/L 105   CO2 mmol/L 29   BUN mg/dL 21   CREATININE mg/dL 1 42*   CALCIUM mg/dL 8 8   ALK PHOS U/L 37*   ALT U/L 39   AST U/L 76*                      Results from last 7 days   Lab Units 02/07/21  1424   TROPONIN I ng/mL <0 02     Micro:  No results found for: Lauren Patel, Dario Aguilar, SPUTUMCULTUR  IMAGING & DIAGNOSTIC TESTS     Imaging: I have personally reviewed pertinent reports  Ct Head Without Contrast    Result Date: 2/7/2021  Impression: No acute intracranial abnormality  Workstation performed: [id number]     Ct Spine Cervical Without Contrast    Addendum Date: 2/7/2021    ADDENDUM: Comparison made to cervical spine CT from 11/21/2014  Result Date: 2/7/2021  Impression: 1  No cervical spine fracture or traumatic subluxation  2   Straightening of the cervical lordosis could suggest component of muscle spasm  3   Small to moderate multilevel spondylosis  No high-grade neural impingement or significant change since 2014  Workstation performed: [id number]     Ct Chest Abdomen Pelvis W Contrast    Result Date: 2/7/2021  Impression: 1  Multifocal peripheral opacities through without lungs  In the setting of clinically suspected COVID-19, the above lung parenchymal findings on CT indicate intermediate to high confidence level for COVID-19 pneumonia  Parenchymal findings are moderate  in degree  2   No acute findings in the abdomen or pelvis  The study was marked in [facility] for immediate notification  Workstation performed: [id number]     EKG, Pathology, and Other Studies: I have personally reviewed pertinent reports  ALLERGIES   No Known Allergies  MEDICATIONS PRIOR TO ARRIVAL     Prior to Admission medications    Medication Sig Start Date End Date Taking?  Authorizing Provider   benzonatate (TESSALON) 200 MG capsule Take 1 capsule (200 mg total) by mouth 3 (three) times a day as needed for cough 2/5/21   Darian Tracy PA-C   Blood Pressure Monitoring (BLOOD PRESSURE MONITOR/M CUFF) MISC by Does not apply route daily  Patient not taking: Reported on 2/5/2021 6/8/20   Prakash Kwok PA-C   fluticasone [facility]) 50 mcg/act nasal spray 2 sprays into each nostril daily 2/5/21   Darian Tracy PA-C   ketoconazole (NIZORAL) 2 % cream Apply topically daily  Patient not taking: Reported on 2/5/2021 6/8/20   Marina Headley PA-C   loratadine (CLARITIN) 10 mg tablet Take 1 tablet (10 mg total) by mouth daily 2/5/21   Sienna Rings, PA-C   meloxicam (MOBIC) 15 mg tablet Take 1 tablet (15 mg total) by mouth daily In the morning with food for neck pain 2/5/21   Sienna Rings, PA-C   methocarbamol (ROBAXIN) 500 mg tablet Take 1 tablet (500 mg total) by mouth daily at bedtime For neck pain 2/5/21   Sienna Rings, PA-C     MEDICATIONS ADMINISTERED IN LAST 24 HOURS     Medication Administration - last 24 hours from 02/06/2021 2009 to 02/07/2021 2009       Date/Time Order Dose Route Action Action by     02/07/2021 1435 acetaminophen (TYLENOL) tablet 650 mg 650 mg Oral Given Refjose Thompson, MATTI     02/07/2021 1930 sodium chloride 0 9 % bolus 1,000 mL 0 mL Intravenous Stopped Refugia MATTI Thompson     02/07/2021 1427 sodium chloride 0 9 % bolus 1,000 mL 1,000 mL Intravenous [address] Refugia Donna, MATTI     02/07/2021 1543 iohexol (OMNIPAQUE) 350 MG/ML injection (MULTI-DOSE) 100 mL 100 mL Intravenous Given [facility]     02/07/2021 1940 remdesivir (Veklury) 200 mg in sodium chloride 0 9 % 250 mL IVPB 200 mg Intravenous New Bag Refugia Donna, RN        CURRENT MEDICATIONS     Current Facility-Administered Medications   Medication Dose Route Frequency Provider Last Rate    ascorbic acid  1,000 mg Oral Q12H Shirin Bolton MD      benzonatate  200 mg Oral TID PRN Taylor Gonzalez MD      [START ON 2/8/2021] cholecalciferol  2,000 Units Oral Daily Taylor Gonzalez MD      [START ON 2/8/2021] fluticasone  2 spray Nasal Daily Taylor Gonzalez MD      heparin (porcine)  5,000 Units Subcutaneous [facility] Taylor Gonzalez MD      [START ON 2/8/2021] insulin lispro  1-6 Units Subcutaneous TID [facility] Taylor Gonzalez MD      Rogenia Bolds ON 2/8/2021] loratadine  10 mg Oral Daily MD Param Riojasamol  500 mg Oral HS Taylor Gonzalez MD      multi-electrolyte  100 mL/hr Intravenous Continuous Fredrich Cordia MD Sofia Tariq Stacey De León ON 2/8/2021] zinc sulfate  220 mg Oral Daily Sravani Reyes MD      Followed by   Taryn Saldana ON 2/15/2021] multivitamin-minerals  1 tablet Oral Daily Sravani Reyes MD      remdesivir  200 mg Intravenous Q24H Sravani Reyes MD      Followed by   Taryn Saldana ON 2/8/2021] remdesivir  100 mg Intravenous Q24H Sravani Reyes MD       multi-electrolyte, 100 mL/hr      benzonatate, 200 mg, TID PRN        Admission Time  I spent 45 minutes admitting the patient  This involved direct patient contact where I performed a full history and physical, reviewing previous records, and reviewing laboratory and other diagnostic studies  Portions of the record may have been created with voice recognition software  Occasional wrong word or "sound a like" substitutions may have occurred due to the inherent limitations of voice recognition software    Read the chart carefully and recognize, using context, where substitutions have occurred     ==  Sravani Reyes MD  [address] [place] [address]  Internal Medicine Residency PGY-1

## 2021-02-08 NOTE — ASSESSMENT & PLAN NOTE
Episode of painless hematuria noted in the morning 2/7, not associated with fever, chills, dysuria, suprapubic or back pain     UA with figueroa colored urine with protein and large blood, no signs of infection  Hematuria possibly related to BPH vs COVID 19 infection    Plan:  -Urine has been clear since admission  -Urology consulted -> will arrange outpatient follow up and future cystoscopy   -Hb stable  -Continue to monitor Hb and for signs of hematuria

## 2021-02-08 NOTE — ED PROVIDER NOTES
History  Chief Complaint   Patient presents with    Blood in Urine     pt presents ambulatory with c/o hematuria since this morning  denies dysuria/flank pain, reports "sometimes" suprapubic pain, +urinary urgency       Blood in Urine  Pertinent negatives include no abdominal pain, dysuria, fever, flank pain or vomiting  Fatigue  Severity:  Mild  Onset quality:  Gradual  Duration:  1 week  Timing:  Constant  Progression:  Waxing and waning  Chronicity:  New  Associated symptoms: cough and headaches    Associated symptoms: no abdominal pain, no chest pain, no dysuria, no fever, no shortness of breath and no vomiting        Prior to Admission Medications   Prescriptions Last Dose Informant Patient Reported? Taking? Blood Pressure Monitoring (BLOOD PRESSURE MONITOR/M CUFF) MISC   No No   Sig: by Does not apply route daily   Patient not taking: Reported on 2021   benzonatate (TESSALON) 200 MG capsule   No No   Sig: Take 1 capsule (200 mg total) by mouth 3 (three) times a day as needed for cough   fluticasone (FLONASE) 50 mcg/act nasal spray   No No   Si sprays into each nostril daily   ketoconazole (NIZORAL) 2 % cream   No No   Sig: Apply topically daily   Patient not taking: Reported on 2021   loratadine (CLARITIN) 10 mg tablet   No No   Sig: Take 1 tablet (10 mg total) by mouth daily   meloxicam (MOBIC) 15 mg tablet   No No   Sig: Take 1 tablet (15 mg total) by mouth daily In the morning with food for neck pain   methocarbamol (ROBAXIN) 500 mg tablet   No No   Sig: Take 1 tablet (500 mg total) by mouth daily at bedtime For neck pain      Facility-Administered Medications: None       Past Medical History:   Diagnosis Date    Prediabetes        History reviewed  No pertinent surgical history      Family History   Problem Relation Age of Onset    No Known Problems Mother     No Known Problems Father     Diabetes Sister     Diabetes Brother     No Known Problems Son     No Known Problems Daughter  No Known Problems Sister     No Known Problems Sister     No Known Problems Sister     No Known Problems Sister     No Known Problems Brother     No Known Problems Brother     No Known Problems Brother     No Known Problems Son     No Known Problems Son     No Known Problems Daughter      I have reviewed and agree with the history as documented  E-Cigarette/Vaping    E-Cigarette Use Never User      E-Cigarette/Vaping Substances    Nicotine No     THC No     CBD No     Flavoring No     Other No     Unknown No      Social History     Tobacco Use    Smoking status: Never Smoker    Smokeless tobacco: Never Used   Substance Use Topics    Alcohol use: No    Drug use: No        Review of Systems   Constitutional: Positive for fatigue  Negative for fever  Respiratory: Positive for cough  Negative for shortness of breath  Cardiovascular: Negative for chest pain and leg swelling  Gastrointestinal: Negative for abdominal pain and vomiting  Genitourinary: Positive for hematuria  Negative for dysuria and flank pain  Musculoskeletal: Negative for neck pain and neck stiffness  Skin: Negative for rash  Neurological: Positive for weakness (generalized) and headaches  Negative for syncope and numbness  Psychiatric/Behavioral: Negative for confusion  All other systems reviewed and are negative        Physical Exam  ED Triage Vitals   Temperature Pulse Respirations Blood Pressure SpO2   02/07/21 1338 02/07/21 1338 02/07/21 1338 02/07/21 1338 02/07/21 1338   99 6 °F (37 6 °C) 86 20 135/73 94 %      Temp Source Heart Rate Source Patient Position - Orthostatic VS BP Location FiO2 (%)   02/07/21 1338 02/07/21 1338 02/07/21 2127 02/07/21 2127 --   Oral Monitor Lying Right arm       Pain Score       02/07/21 1750       No Pain             Orthostatic Vital Signs  Vitals:    02/08/21 0345 02/08/21 0630 02/08/21 1100 02/08/21 1304   BP: 131/60 124/66 126/66 116/68   Pulse: 76 72 62 72   Patient Position - Orthostatic VS: Lying Lying Lying Sitting       Physical Exam  Vitals signs and nursing note reviewed  Constitutional:       General: He is not in acute distress  Appearance: He is well-developed  He is ill-appearing  He is not toxic-appearing or diaphoretic  HENT:      Head: Normocephalic  Right Ear: External ear normal       Left Ear: External ear normal       Nose: Nose normal    Eyes:      Conjunctiva/sclera: Conjunctivae normal    Neck:      Musculoskeletal: Normal range of motion  No neck rigidity  Trachea: No tracheal deviation  Cardiovascular:      Rate and Rhythm: Normal rate  Pulses: Normal pulses  Pulmonary:      Effort: No respiratory distress  Breath sounds: No stridor  Comments: Mild tachypnea, no respiratory distress  Abdominal:      General: There is no distension  Tenderness: There is no abdominal tenderness  There is no right CVA tenderness or left CVA tenderness  Musculoskeletal:         General: No swelling or tenderness  Skin:     General: Skin is warm and dry  Findings: No rash  Neurological:      General: No focal deficit present  Mental Status: He is alert  Cranial Nerves: No cranial nerve deficit  Sensory: No sensory deficit  Motor: No abnormal muscle tone     Psychiatric:         Behavior: Behavior normal          ED Medications  Medications   benzonatate (TESSALON PERLES) capsule 200 mg (has no administration in time range)   fluticasone (FLONASE) 50 mcg/act nasal spray 2 spray (2 sprays Nasal Given 2/8/21 0838)   loratadine (CLARITIN) tablet 10 mg (10 mg Oral Given 2/8/21 0838)   methocarbamol (ROBAXIN) tablet 500 mg (500 mg Oral Given 2/7/21 2109)   cholecalciferol (VITAMIN D3) tablet 2,000 Units (2,000 Units Oral Given 2/8/21 0837)   ascorbic acid (VITAMIN C) tablet 1,000 mg (1,000 mg Oral Given 2/8/21 0837)   zinc sulfate (ZINCATE) capsule 220 mg (220 mg Oral Given 2/8/21 0838)     Followed by multivitamin-minerals (CENTRUM ADULTS) tablet 1 tablet (has no administration in time range)   remdesivir (Veklury) 200 mg in sodium chloride 0 9 % 250 mL IVPB (0 mg Intravenous Stopped 2/7/21 2015)     Followed by   remdesivir [place]y [place]) 100 mg in sodium chloride 0 9 % 250 mL IVPB (has no administration in time range)   insulin lispro (HumaLOG) 100 units/mL subcutaneous injection 1-6 Units (4 Units Subcutaneous Given 2/8/21 1200)   dexamethasone (DECADRON) injection 6 mg (6 mg Intravenous Given 2/8/21 0511)   famotidine (PEPCID) tablet 20 mg (20 mg Oral Given 2/8/21 0838)   acetaminophen (TYLENOL) tablet 650 mg (has no administration in time range)   atorvastatin (LIPITOR) tablet 40 mg (has no administration in time range)   ceftriaxone (ROCEPHIN) 1 g/50 mL in dextrose IVPB (1,000 mg Intravenous New Bag 2/8/21 0511)   doxycycline hyclate (VIBRAMYCIN) capsule 100 mg (100 mg Oral Given 2/8/21 0838)   enoxaparin (LOVENOX) subcutaneous injection 80 mg (has no administration in time range)   acetaminophen (TYLENOL) tablet 650 mg (650 mg Oral Given 2/7/21 1435)   sodium chloride 0 9 % bolus 1,000 mL (0 mL Intravenous Stopped 2/7/21 1930)   iohexol (OMNIPAQUE) 350 MG/ML injection (MULTI-DOSE) 100 mL (100 mL Intravenous Given 2/7/21 1543)       Diagnostic Studies  Results Reviewed     Procedure Component Value Units Date/Time    Fingerstick Glucose (POCT) [[id number]]  (Abnormal) Collected: 02/08/21 1143    Lab Status: Final result Updated: 02/08/21 1150     POC Glucose 306 mg/dl     Fingerstick Glucose (POCT) [[id number]]  (Abnormal) Collected: 02/08/21 0705    Lab Status: Final result Updated: 02/08/21 0706     POC Glucose 141 mg/dl     Procalcitonin Reflex [[id number]]  (Abnormal) Collected: 02/08/21 0513    Lab Status: Final result Specimen: Blood from Arm, Left Updated: 02/08/21 0622     Procalcitonin 0 49 ng/ml     Basic metabolic panel [[id number]]  (Abnormal) Collected: 02/08/21 0513    Lab Status: Final result Specimen: Blood from Arm, Left Updated: 02/08/21 0545     Sodium 140 mmol/L      Potassium 3 9 mmol/L      Chloride 108 mmol/L      CO2 27 mmol/L      ANION GAP 5 mmol/L      BUN 19 mg/dL      Creatinine 1 11 mg/dL      Glucose 113 mg/dL      Calcium 8 2 mg/dL      eGFR 78 ml/min/1 73sq m     Narrative:      [place] guidelines for Chronic Kidney Disease (CKD):     Stage 1 with normal or high GFR (GFR > 90 mL/min/1 73 square meters)    Stage 2 Mild CKD (GFR = 60-89 mL/min/1 73 square meters)    Stage 3A Moderate CKD (GFR = 45-59 mL/min/1 73 square meters)    Stage 3B Moderate CKD (GFR = 30-44 mL/min/1 73 square meters)    Stage 4 Severe CKD (GFR = 15-29 mL/min/1 73 square meters)    Stage 5 End Stage CKD (GFR <15 mL/min/1 73 square meters)  Note: GFR calculation is accurate only with a steady state creatinine    CBC (With Platelets) [[id number]]  (Abnormal) Collected: 02/08/21 0513    Lab Status: Final result Specimen: Blood from Arm, Left Updated: 02/08/21 0531     WBC 3 60 Thousand/uL      RBC 4 80 Million/uL      Hemoglobin 12 2 g/dL      Hematocrit 37 9 %      MCV 79 fL      MCH 25 4 pg      MCHC 32 2 g/dL      RDW 12 9 %      Platelets 282 Thousands/uL      MPV 11 0 fL     Blood culture #1 [[id number]] Collected: 02/07/21 1938    Lab Status: Preliminary result Specimen: Blood from Arm, Left Updated: 02/08/21 0002     Blood Culture Received in Microbiology Lab  Culture in Progress  Blood culture #2 [[id number]] Collected: 02/07/21 1938    Lab Status: Preliminary result Specimen: Blood from Arm, Right Updated: 02/08/21 0002     Blood Culture Received in Microbiology Lab  Culture in Progress      Procalcitonin with AM Reflex [[id number]]  (Abnormal) Collected: 02/07/21 1938    Lab Status: Final result Specimen: Blood from Arm, Right Updated: 02/07/21 2128     Procalcitonin 0 51 ng/ml     Chronic Hepatitis Panel [[id number]]  (Normal) Collected: 02/07/21 1938    Lab Status: Final result Specimen: Blood from Arm, Right Updated: 02/07/21 2103     Hepatitis B Surface Ag Non-reactive     Hepatitis C Ab Non-reactive     Hep B C IgM Non-reactive     Hep B Core Total Ab Non-reactive    D-dimer, quantitative [[id number]]  (Abnormal) Collected: 02/07/21 1938    Lab Status: Final result Specimen: Blood from Arm, Right Updated: 02/07/21 2030     D-Dimer, Quant 5 46 ug/ml FEU     CKMB [[id number]]  (Normal) Collected: 02/07/21 1424    Lab Status: Final result Specimen: Blood from Arm, Left Updated: 02/07/21 2028     CK-MB Index <1 0 %      CK-MB 2 4 ng/mL     Rapid HIV 1/2 AB-AG Combo [[id number]]  (Normal) Collected: 02/07/21 1938    Lab Status: Final result Specimen: Blood from Arm, Right Updated: 02/07/21 2022     Rapid HIV 1 AND 2 Non-Reactive     HIV-1 P24 Ag Screen Non-Reactive    Narrative:      Negative for HIV-1 p24 Antigen  Negative for HIV-1 and/or HIV-2 Antibody  Lactic acid, plasma [[id number]]  (Normal) Collected: 02/07/21 1938    Lab Status: Final result Specimen: Blood from Arm, Right Updated: 02/07/21 2019     LACTIC ACID 0 9 mmol/L     Narrative:      Result may be elevated if tourniquet was used during collection      Hemoglobin A1C w/ EAG Estimation [[id number]]  (Abnormal) Collected: 02/07/21 1424    Lab Status: Final result Specimen: Blood from Arm, Left Updated: 02/07/21 2012     Hemoglobin A1C 6 9 %       mg/dl     CK (with reflex to MB) [[id number]]  (Abnormal) Collected: 02/07/21 1424    Lab Status: Final result Specimen: Blood from Arm, Left Updated: 02/07/21 2006     Total  U/L     C-reactive protein [[id number]]  (Abnormal) Collected: 02/07/21 1424    Lab Status: Final result Specimen: Blood from Arm, Left Updated: 02/07/21 2006     CRP 97 2 mg/L     Ferritin [[id number]]  (Abnormal) Collected: 02/07/21 1424    Lab Status: Final result Specimen: Blood from Arm, Left Updated: 02/07/21 2006     Ferritin 966 ng/mL     NT-BNP PRO [[id number]]  (Normal) Collected: 02/07/21 1426 Lab Status: Final result Specimen: Blood from Arm, Left Updated: 02/07/21 2006     NT-proBNP 9 pg/mL     COVID19, Influenza A/B, RSV PCR, [facility] [[id number]]  (Abnormal) Collected: 02/07/21 1424    Lab Status: Final result Specimen: Nares from Nose Updated: 02/07/21 1612     SARS-CoV-2 Positive     INFLUENZA A PCR Negative     INFLUENZA B PCR Negative     RSV PCR Negative    Narrative: This test has been authorized by FDA under an EUA (Emergency Use Assay) for use by authorized laboratories  Clinical caution and judgement should be used with the interpretation of these results with consideration of the clinical impression and other laboratory testing  Testing reported as "Positive" or "Negative" has been proven to be accurate according to standard laboratory validation requirements  All testing is performed with control materials showing appropriate reactivity at standard intervals  TSH, 3rd generation with Free T4 reflex [[id number]]  (Normal) Collected: 02/07/21 1424    Lab Status: Final result Specimen: Blood from Arm, Left Updated: 02/07/21 1503     TSH 3RD GENERATON 2 400 uIU/mL     Narrative:      Patients undergoing fluorescein dye angiography may retain small amounts of fluorescein in the body for 48-72 hours post procedure  Samples containing fluorescein can produce falsely depressed TSH values  If the patient had this procedure,a specimen should be resubmitted post fluorescein clearance        Comprehensive metabolic panel [[id number]]  (Abnormal) Collected: 02/07/21 1424    Lab Status: Final result Specimen: Blood from Arm, Left Updated: 02/07/21 1457     Sodium 140 mmol/L      Potassium 4 1 mmol/L      Chloride 105 mmol/L      CO2 29 mmol/L      ANION GAP 6 mmol/L      BUN 21 mg/dL      Creatinine 1 42 mg/dL      Glucose 124 mg/dL      Calcium 8 8 mg/dL      Corrected Calcium 9 4 mg/dL      AST 76 U/L      ALT 39 U/L      Alkaline Phosphatase 37 U/L      Total Protein 8 6 g/dL      Albumin 3 3 g/dL      Total Bilirubin 0 62 mg/dL      eGFR 58 ml/min/1 73sq m     Narrative:      National Kid[employer] guidelines for Chronic Kidney Disease (CKD):     Stage 1 with normal or high GFR (GFR > 90 mL/min/1 73 square meters)    Stage 2 Mild CKD (GFR = 60-89 mL/min/1 73 square meters)    Stage 3A Moderate CKD (GFR = 45-59 mL/min/1 73 square meters)    Stage 3B Moderate CKD (GFR = 30-44 mL/min/1 73 square meters)    Stage 4 Severe CKD (GFR = 15-29 mL/min/1 73 square meters)    Stage 5 End Stage CKD (GFR <15 mL/min/1 73 square meters)  Note: GFR calculation is accurate only with a steady state creatinine    Troponin I [[id number]]  (Normal) Collected: 02/07/21 1424    Lab Status: Final result Specimen: Blood from Arm, Left Updated: 02/07/21 1457     Troponin I <0 02 ng/mL     Calcium, ionized [[id number]]  (Abnormal) Collected: 02/07/21 1424    Lab Status: Final result Specimen: Blood from Arm, Left Updated: 02/07/21 1440     Calcium, Ionized 1 06 mmol/L     CBC and differential [[id number]]  (Abnormal) Collected: 02/07/21 1424    Lab Status: Final result Specimen: Blood from Arm, Left Updated: 02/07/21 1439     WBC 4 80 Thousand/uL      RBC 5 47 Million/uL      Hemoglobin 14 0 g/dL      Hematocrit 43 7 %      MCV 80 fL      MCH 25 6 pg      MCHC 32 0 g/dL      RDW 12 8 %      MPV 10 9 fL      Platelets 914 Thousands/uL      nRBC 0 /100 WBCs      Neutrophils Relative 83 %      Immat GRANS % 0 %      Lymphocytes Relative 13 %      Monocytes Relative 4 %      Eosinophils Relative 0 %      Basophils Relative 0 %      Neutrophils Absolute 3 93 Thousands/µL      Immature Grans Absolute 0 02 Thousand/uL      Lymphocytes Absolute 0 64 Thousands/µL      Monocytes Absolute 0 21 Thousand/µL      Eosinophils Absolute 0 00 Thousand/µL      Basophils Absolute 0 00 Thousands/µL     Urine Microscopic [[id number]]  (Abnormal) Collected: 02/07/21 1339    Lab Status: Final result Specimen: Urine, Other Updated: 02/07/21 1420     RBC, UA 20-30 /hpf      WBC, UA 2-4 /hpf      Epithelial Cells None Seen /hpf      Bacteria, UA Occasional /hpf      COARSE GRANULAR CASTS 5-10 /lpf      AMORPH URATES Occasional /hpf     Urine Macroscopic, POC [[id number]]  (Abnormal) Collected: 02/07/21 1339    Lab Status: Final result Specimen: Urine Updated: 02/07/21 1340     Color, UA Nathalia     Clarity, UA Clear     pH, UA 5 5     Leukocytes, UA Negative     Nitrite, UA Negative     Protein, UA >=300 mg/dl      Glucose, UA Negative mg/dl      Ketones, UA Negative mg/dl      Urobilinogen, UA 0 2 E U /dl      Bilirubin, UA Negative     Blood, UA Large     Specific Gravity, UA >=1 030    Narrative:      CLINITEK RESULT                 CT chest abdomen pelvis w contrast   Final Result by Jb Odom MD (02/07 1627)      1  Multifocal peripheral opacities through without lungs  In the setting of clinically suspected COVID-19, the above lung parenchymal findings on CT indicate intermediate to high confidence level for COVID-19 pneumonia  Parenchymal findings are moderate    in degree  2   No acute findings in the abdomen or pelvis  The study was marked in [facility] for immediate notification  Workstation performed: [id number]         CT head without contrast   Final Result by Jb Odom MD (02/07 1600)      No acute intracranial abnormality  Workstation performed: [id number]         CT spine cervical without contrast   Final Result by  (02/08 1544)   Addendum 1 of 1 by Jb Odom MD (02/07 1610)   ADDENDUM:      Comparison made to cervical spine CT from 11/21/2014        Final            Procedures  Procedures      ED Course  ED Course as of Feb 08 1544   Lui Cool Feb 07, 2021   1414 Leukocytes, UA: Negative   1414 Nitrite, UA: Negative   1527 Creatinine(!): 1 42   1527 Bacteria, UA: Occasional   1527 WBC, UA: 2-4   1527 AST(!): 76   1527 TOTAL BILIRUBIN: 0 62   1544 Calcium, Ionized(!): 1 06   1659 SARS-COV-2(!): Positive   200 Pt and son updated on dx of covid, CASE, and plan for admission  Also updated on CT findings of renal cyst, enlarged prostate and that he should obtain future f/u for further evaluation including to r/o outlying possibility of malignancy  Pt and son both verbalized understanding  All questions answered to their satisfaction  SBIRT 22yo+      Most Recent Value   SBIRT (24 yo +)   In order to provide better care to our patients, we are screening all of our patients for alcohol and drug use  Would it be okay to ask you these screening questions? Yes Filed at: 02/08/2021 0031   Initial Alcohol Screen: US AUDIT-C    1  How often do you have a drink containing alcohol?  0 Filed at: 02/08/2021 0031   2  How many drinks containing alcohol do you have on a typical day you are drinking? 0 Filed at: 02/08/2021 0031   3b  FEMALE Any Age, or MALE 65+: How often do you have 4 or more drinks on one occassion? 0 Filed at: 02/08/2021 0031   Audit-C Score  0 Filed at: 02/08/2021 0031   DANIELLE: How many times in the past year have you    Used an illegal drug or used a prescription medication for non-medical reasons? Never Filed at: 02/08/2021 0031                MDM  Number of Diagnoses or Management Options  Diagnosis management comments: This is a 62-year-old male who presents for complaints of fatigue and generalized weakness as well as a report of blood in his urine, patient states that for the past month he has had generalized fatigue although the son states that in the past week the patient has had decreased p o  Intake and received worsen usual, patient does endorse a mild nonproductive cough, he has not had any fevers at home, son states that he had [place] recently but states he did isolate from the patient at that time, the patient also reports recently taking a bus ride to [facility]  Patient has not had any other GI symptoms except for decreased p o  Intake    He denies chest pain  Regarding the blood in his urine he states that it is painless, he has not had any dysuria, no flank pain, no history of kidney stones  Patient also reports of mild generalized headache with no vision changes, neck pain, fever, no recent fall or injury    I made the decision to obtain, review, and summarize prior medical records of the patient  Pertinent summary of chart review:  Recent prostate screening with order for PSA, no documented history of bladder cancer or other malignancies    Patient exam appears somewhat ill but nontoxic, he has stable vital signs with a pulse ox of 93-95% on room air, he is noted to be mildly tachypneic on my examination with a rate in the 30s but does not appear to be in significant respiratory distress  No CVA tenderness  Physical exam is otherwise unremarkable  Overall his symptoms are nonspecific, based off of history with painless hematuria as well as systemic symptoms of fatigue and weight loss and decreased p o , as well as his age is a risk factor, there is suspicion for potential underlying malignancy, will obtain a CT chest abdomen and pelvis to screen for lesions, will also obtain CT head and neck given new headache to rule out intracranial lesion in spina, will also rule out other pathology including kidney stone on abdomen pelvis  Given cough will also evaluate for pneumonia CT  Will screen for COVID-19  Will also obtain CBC, CMP to evaluate white count rule out anemia in to assess for kidney function and the biliary function, urinalysis to rule out UTI  Re-evaluation:  Patient found to be positive for COVID B with suspicious findings for viral pneumonia on CT  Also found to have an Virgil likely secondary to dehydration, given fluids  Will plan to admit for further management    Patient is noted to have enlarged prostate and renal cysts, counseled patient that he should have these further evaluated on the outlying possibility that this is a malignancy although less likely based off of Radiology reading  Procedure Note: EKG  Date/Time: 02/08/21 3:43 PM   Interpreted by: Roshni Crowe   Indications / Diagnosis:  Fatigue  EKG reviewed by me, the ED Provider: yes   The EKG demonstrates:  Rhythm: normal sinus  Intervals: normal intervals  Axis: normal axis  QRS/Blocks: normal QRS  ST Changes: No acute ST Changes, no STD/KAMILLE  Amount and/or Complexity of Data Reviewed  Clinical lab tests: ordered and reviewed  Tests in the radiology section of CPT®: ordered and reviewed  Tests in the medicine section of CPT®: ordered and reviewed  Obtain history from someone other than the patient: yes (Patient's son)  Independent visualization of images, tracings, or specimens: yes    Risk of Complications, Morbidity, and/or Mortality  Presenting problems: high        Disposition  Final diagnoses:   Hematuria     Time reflects when diagnosis was documented in both MDM as applicable and the Disposition within this note     Time User Action Codes Description Comment    2/7/2021  6:40 PM Gini Flatten [R31 9] Hematuria     2/7/2021  6:53 PM Glennon Runner, Harlo Grave Modify [R31 9] Hematuria       ED Disposition     None      Follow-up Information     Follow up With Specialties Details Why Contact Info Additional [address] [facility] Urology Follow up in 6 week(s) Urology--service will contact patient/caregiver with hospital follow-up appointment date and time once discharged   [address] [address]  [address] [place] [address] For Urology [place], [address], [place], [address] [facility], [address] [phone]          Current Discharge Medication List      CONTINUE these medications which have NOT CHANGED    Details   benzonatate (TESSALON) 200 MG capsule Take 1 capsule (200 mg total) by mouth 3 (three) times a day as needed for cough  Qty: 30 capsule, Refills: 1    Associated Diagnoses: Chronic cough      Blood Pressure Monitoring (BLOOD PRESSURE MONITOR/M CUFF) MISC by Does not apply route daily  Qty: 1 each, Refills: 0    Associated Diagnoses: Essential hypertension      fluticasone (FLONASE) 50 mcg/act nasal spray 2 sprays into each nostril daily  Qty: 1 Bottle, Refills: 0    Associated Diagnoses: Allergic rhinitis, unspecified seasonality, unspecified trigger      ketoconazole (NIZORAL) 2 % cream Apply topically daily  Qty: 15 g, Refills: 2    Associated Diagnoses: Tinea pedis of both feet      loratadine (CLARITIN) 10 mg tablet Take 1 tablet (10 mg total) by mouth daily  Qty: 30 tablet, Refills: 0    Associated Diagnoses: Allergic rhinitis, unspecified seasonality, unspecified trigger      meloxicam (MOBIC) 15 mg tablet Take 1 tablet (15 mg total) by mouth daily In the morning with food for neck pain  Qty: 30 tablet, Refills: 0    Associated Diagnoses: Acute neck pain      methocarbamol (ROBAXIN) 500 mg tablet Take 1 tablet (500 mg total) by mouth daily at bedtime For neck pain  Qty: 30 tablet, Refills: 0    Associated Diagnoses: Acute neck pain           No discharge procedures on file  PDMP Review     None           ED Provider  Attending physically available and evaluated Joseph Montoya  I managed the patient along with the ED Attending      Electronically Signed by         Ang Morel MD  02/08/21 5283

## 2021-02-08 NOTE — TELEPHONE ENCOUNTER
----- Message from Mindi Lovell PA-C sent at 2/8/2021  9:52 AM EST -----  Patient with with COVID pneumonia and gross hematuria which is now resolved  He will need a office follow-up and cystoscopy once he is discharged  Patient remains inpatient at this time, tested positive for Covid 19 on 2/7/2021  Will monitor for discharge and follow up with patient at that time

## 2021-02-08 NOTE — UTILIZATION REVIEW
Initial Clinical Review    Admission: Date/Time/Statement:   Admission Orders (From admission, onward)     Ordered        02/07/21 1806  Inpatient Admission  Once                   Orders Placed This Encounter   Procedures    Inpatient Admission     Standing Status:   Standing     Number of Occurrences:   1     Order Specific Question:   Level of Care     Answer:   Med Surg [16]     Order Specific Question:   Estimated length of stay     Answer:   More than 2 Midnights     Order Specific Question:   Certification     Answer:   I certify that inpatient services are medically necessary for this patient for a duration of greater than two midnights  See H&P and MD Progress Notes for additional information about the patient's course of treatment  ED Arrival Information     Expected Arrival Acuity Means of Arrival Escorted By Service Admission Type    - 2/7/2021 13:01 Urgent Walk-In Self General Medicine Urgent    Arrival Complaint    Blood in Urine        Chief Complaint   Patient presents with    Blood in Urine     pt presents ambulatory with c/o hematuria since this morning  denies dysuria/flank pain, reports "sometimes" suprapubic pain, +urinary urgency     Assessment/Plan: 77 yo m with a pmh of DM2  He presents to [facility], with cough, weakness , fatigue , dizziness and tremulousness, as well as productive cough, white sputum  His son reports decreased po intake  Today he developed hematuria with robin blood with clots during urination  He reports a fall 3 days prior he did strike his forehead on the floor  In the Ed he was found to be COVID positive, CT abd CXR showed opacities through out the lungs  He is admitted inpatient with COVID - 19 pneumonia           ED Triage Vitals   Temperature Pulse Respirations Blood Pressure SpO2   02/07/21 1338 02/07/21 1338 02/07/21 1338 02/07/21 1338 02/07/21 1338   99 6 °F (37 6 °C) 86 20 135/73 94 %      Temp Source Heart Rate Source Patient Position - Orthostatic VS BP Location FiO2 (%)   02/07/21 1338 02/07/21 1338 02/07/21 2127 02/07/21 2127 --   Oral Monitor Lying Right arm       Pain Score       02/07/21 1750       No Pain          Wt Readings from Last 1 Encounters:   06/08/20 81 kg (178 lb 9 2 oz)     Additional Vital Signs:   Date/Time  Temp  Pulse  Resp  BP  MAP (mmHg)  SpO2  Calculated FIO2 (%) - Nasal Cannula  Nasal Cannula O2 Flow Rate (L/min)  O2 Device  Patient Position - Orthostatic VS   02/08/21 0630  --  72  16  124/66  86  97 %  36  4 L/min  Nasal cannula  Lying   02/08/21 0513  98 9 °F (37 2 °C)  --  --  --  --  --  --  --  --  --   02/08/21 0345  102 7 °F (39 3 °C)Abnormal   76  16  131/60  87  93 %  36  4 L/min  Nasal cannula  Lying   02/07/21 2147  --  74  18  119/69  --  93 %  --  --  None (Room air)  Lying   02/07/21 2127  --  62  20  --  --  94 %  --  --  None (Room air)  Lying   02/07/21 2015  --  76  35Abnormal   --  --  93 %  --  --  --  --   02/07/21 1730  --  72  32Abnormal   118/69  88  97 %  --  --  --  --   02/07/21 1700  --  68  32Abnormal   114/65  84  97 %  --  --  --  --   02/07/21 1600  --  74  22  117/63  85  95 %  --  --  --  --   02/07/21 1500  --  74  20  116/67  86  93 %  --  --  None (Room air)  --           Pertinent Labs/Diagnostic Test Results:   Results from last 7 days   Lab Units 02/07/21  1424   SARS-COV-2  Positive*     Results from last 7 days   Lab Units 02/08/21 0513 02/07/21  1424   WBC Thousand/uL 3 60* 4 80   HEMOGLOBIN g/dL 12 2 14 0   HEMATOCRIT % 37 9 43 7   PLATELETS Thousands/uL 150 151   NEUTROS ABS Thousands/µL  --  3 93         Results from last 7 days   Lab Units 02/08/21 0513 02/07/21  1424   SODIUM mmol/L 140 140   POTASSIUM mmol/L 3 9 4 1   CHLORIDE mmol/L 108 105   CO2 mmol/L 27 29   ANION GAP mmol/L 5 6   BUN mg/dL 19 21   CREATININE mg/dL 1 11 1 42*   EGFR ml/min/1 73sq m 78 58   CALCIUM mg/dL 8 2* 8 8   CALCIUM, IONIZED mmol/L  --  1 06*     Results from last 7 days   Lab Units 02/07/21  1424   AST U/L 76*   ALT U/L 39   ALK PHOS U/L 37*   TOTAL PROTEIN g/dL 8 6*   ALBUMIN g/dL 3 3*   TOTAL BILIRUBIN mg/dL 0 62     Results from last 7 days   Lab Units 02/08/21  0705   POC GLUCOSE mg/dl 141*     Results from last 7 days   Lab Units 02/08/21  0513 02/07/21  1424   GLUCOSE RANDOM mg/dL 113 124         Results from last 7 days   Lab Units 02/07/21  1424   HEMOGLOBIN A1C % 6 9*   EAG mg/dl 151     Results from last 7 days   Lab Units 02/07/21  1424   CK TOTAL U/L 984*   CK MB INDEX % <1 0   CK MB ng/mL 2 4     Results from last 7 days   Lab Units 02/07/21  1424   TROPONIN I ng/mL <0 02     Results from last 7 days   Lab Units 02/07/21  1938   D-DIMER QUANTITATIVE ug/ml FEU 5 46*     Results from last 7 days   Lab Units 02/07/21  1424   TSH 3RD GENERATON uIU/mL 2 400     Results from last 7 days   Lab Units 02/08/21  0513 02/07/21  1938   PROCALCITONIN ng/ml 0 49* 0 51*     Results from last 7 days   Lab Units 02/07/21  1938   LACTIC ACID mmol/L 0 9       Results from last 7 days   Lab Units 02/07/21  1424   NT-PRO BNP pg/mL 9     Results from last 7 days   Lab Units 02/07/21  1424   FERRITIN ng/mL 966*     Results from last 7 days   Lab Units 02/07/21  1938   HEP B S AG  Non-reactive   HEP C AB  Non-reactive   HEP B C IGM  Non-reactive   HEP B C TOTAL AB  Non-reactive         Results from last 7 days   Lab Units 02/07/21  1424   CRP mg/L 97 2*         Results from last 7 days   Lab Units 02/07/21  1339   CLARITY UA  Clear   COLOR UA  Nathalia   SPEC GRAV UA  >=1 030   PH UA  5 5   GLUCOSE UA mg/dl Negative   KETONES UA mg/dl Negative   BLOOD UA  Large*   PROTEIN UA mg/dl >=300*   NITRITE UA  Negative   BILIRUBIN UA  Negative   UROBILINOGEN UA E U /dl 0 2   LEUKOCYTES UA  Negative   WBC UA /hpf 2-4   RBC UA /hpf 20-30*   BACTERIA UA /hpf Occasional   EPITHELIAL CELLS WET PREP /hpf None Seen     Results from last 7 days   Lab Units 02/07/21  1424   INFLUENZA A PCR  Negative   INFLUENZA B PCR  Negative   RSV PCR  Negative Results from last 7 days   Lab Units 02/07/21  1938   BLOOD CULTURE  Received in Microbiology Lab  Culture in Progress  Received in Microbiology Lab  Culture in Progress  CT CAP- 2/7 - 1   Multifocal peripheral opacities through without lungs  In the setting of clinically suspected COVID-19, the above lung parenchymal findings on CT indicate intermediate to high confidence level for COVID-19 pneumonia  Parenchymal findings are moderate    in degree  2   No acute findings in the abdomen or pelvis  CT head - 2/7 -  No acute intracranial abnormality  CT Cervical Spine - 2/7 - 1   No cervical spine fracture or traumatic subluxation  2   Straightening of the cervical lordosis could suggest component of muscle spasm  3   Small to moderate multilevel spondylosis  No high-grade neural impingement or significant change since 2014  ECG -   Collection Time Result Time Vent R Atrial R VA Int  QRSD Int  QT Int  QTC Int   P Axis QRS Axis T Wave Ax    02/07/21 14:22:58 02/07/21 17:53:53 79 40  104 352 403  -84 50       Sinus rhythm   Left axis deviation   Abnormal ECG   When compared with ECG of 17-APR-2019 17:40,   Nonspecific T wave abnormality no longer evident in Anterior leads                    ED Treatment:   Medication Administration from 02/07/2021 1300 to 02/08/2021 0731       Date/Time Order Dose Route Action Comments     02/07/2021 1435 acetaminophen (TYLENOL) tablet 650 mg 650 mg Oral Given      02/07/2021 1427 sodium chloride 0 9 % bolus 1,000 mL 1,000 mL Intravenous New Bag      02/07/2021 1543 iohexol (OMNIPAQUE) 350 MG/ML injection (MULTI-DOSE) 100 mL 100 mL Intravenous Given      02/07/2021 2109 methocarbamol (ROBAXIN) tablet 500 mg 500 mg Oral Given      02/07/2021 2026 multi-electrolyte (PLASMALYTE-A/ISOLYTE-S PH 7 4) IV solution 100 mL/hr Intravenous New Bag      02/07/2021 2108 ascorbic acid (VITAMIN C) tablet 1,000 mg 1,000 mg Oral Given      02/07/2021 1940 remdesivir (Veklury) 200 mg in sodium chloride 0 9 % 250 mL IVPB 200 mg Intravenous New Bag      02/08/2021 0401 acetaminophen (TYLENOL) tablet 650 mg 650 mg Oral Given      02/08/2021 0511 dexamethasone (DECADRON) injection 6 mg 6 mg Intravenous Given      02/08/2021 0511 ceftriaxone (ROCEPHIN) 1 g/50 mL in dextrose IVPB 1,000 mg Intravenous New Bag         Past Medical History:   Diagnosis Date    Prediabetes      Present on Admission:  **None**      Admitting Diagnosis: Blood in urine [R31 9]  Age/Sex: 78 y o  male         Admission Orders:  Scheduled Medications:  ascorbic acid, 1,000 mg, Oral, Q12H [address]  atorvastatin, 40 mg, Oral, Daily With Dinner  cefTRIAXone, 1,000 mg, Intravenous, Q24H  cholecalciferol, 2,000 Units, Oral, Daily  dexamethasone, 6 mg, Intravenous, Q24H  doxycycline hyclate, 100 mg, Oral, Q12H ILDA  enoxaparin, 1 mg/kg, Subcutaneous, Q12H ILDA  famotidine, 20 mg, Oral, Daily  fluticasone, 2 spray, Nasal, Daily  insulin lispro, 1-6 Units, Subcutaneous, TID AC  loratadine, 10 mg, Oral, Daily  methocarbamol, 500 mg, Oral, HS  zinc sulfate, 220 mg, Oral, Daily    Followed by  Lindy Osborne ON 2/15/2021] multivitamin-minerals, 1 tablet, Oral, Daily  remdesivir, 100 mg, Intravenous, Q24H      Continuous IV Infusions:     PRN Meds:  acetaminophen, 650 mg, Oral, Q6H PRN  benzonatate, 200 mg, Oral, TID PRN          Network Utilization Review Department  ATTENTION: Please call with any questions or concerns to [phone] and carefully listen to the prompts so that you are directed to the right person  All voicemails are confidential   Mardiya Shaper all requests for admission clinical reviews, approved or denied determinations and any other requests to dedicated fax number below belonging to the campus where the patient is receiving treatment   List of dedicated fax numbers for the Facilities:  [address] DENIALS (Administrative/Medical Necessity) [phone]   [address] (Maternity/NICU/Pediatrics) [phone] [address] [facility] [address] Frida Rodney [address] ([address]) [address] [place] [address] [address] [facility]) [address] [place] [address] 951 [phone]

## 2021-02-09 LAB
ABO GROUP BLD BPU: NORMAL
ANION GAP SERPL CALCULATED.3IONS-SCNC: 5 MMOL/L (ref 4–13)
BPU ID: NORMAL
BUN SERPL-MCNC: 25 MG/DL (ref 5–25)
CALCIUM SERPL-MCNC: 8.5 MG/DL (ref 8.3–10.1)
CHLORIDE SERPL-SCNC: 110 MMOL/L (ref 100–108)
CO2 SERPL-SCNC: 27 MMOL/L (ref 21–32)
CREAT SERPL-MCNC: 1.02 MG/DL (ref 0.6–1.3)
CRP SERPL QL: 63.6 MG/L
ERYTHROCYTE [DISTWIDTH] IN BLOOD BY AUTOMATED COUNT: 13 % (ref 11.6–15.1)
FERRITIN SERPL-MCNC: 1000 NG/ML (ref 8–388)
GFR SERPL CREATININE-BSD FRML MDRD: 87 ML/MIN/1.73SQ M
GLUCOSE SERPL-MCNC: 164 MG/DL (ref 65–140)
GLUCOSE SERPL-MCNC: 197 MG/DL (ref 65–140)
GLUCOSE SERPL-MCNC: 198 MG/DL (ref 65–140)
GLUCOSE SERPL-MCNC: 203 MG/DL (ref 65–140)
GLUCOSE SERPL-MCNC: 316 MG/DL (ref 65–140)
HCT VFR BLD AUTO: 37.8 % (ref 36.5–49.3)
HGB BLD-MCNC: 12.1 G/DL (ref 12–17)
MCH RBC QN AUTO: 25.3 PG (ref 26.8–34.3)
MCHC RBC AUTO-ENTMCNC: 32 G/DL (ref 31.4–37.4)
MCV RBC AUTO: 79 FL (ref 82–98)
PLATELET # BLD AUTO: 172 THOUSANDS/UL (ref 149–390)
PMV BLD AUTO: 10.9 FL (ref 8.9–12.7)
POTASSIUM SERPL-SCNC: 4.4 MMOL/L (ref 3.5–5.3)
PROCALCITONIN SERPL-MCNC: 0.33 NG/ML
RBC # BLD AUTO: 4.78 MILLION/UL (ref 3.88–5.62)
SODIUM SERPL-SCNC: 142 MMOL/L (ref 136–145)
UNIT DISPENSE STATUS: NORMAL
UNIT PRODUCT CODE: NORMAL
UNIT RH: NORMAL
WBC # BLD AUTO: 4.52 THOUSAND/UL (ref 4.31–10.16)

## 2021-02-09 PROCEDURE — 86140 C-REACTIVE PROTEIN: CPT | Performed by: STUDENT IN AN ORGANIZED HEALTH CARE EDUCATION/TRAINING PROGRAM

## 2021-02-09 PROCEDURE — 82948 REAGENT STRIP/BLOOD GLUCOSE: CPT

## 2021-02-09 PROCEDURE — 85027 COMPLETE CBC AUTOMATED: CPT | Performed by: STUDENT IN AN ORGANIZED HEALTH CARE EDUCATION/TRAINING PROGRAM

## 2021-02-09 PROCEDURE — 84145 PROCALCITONIN (PCT): CPT | Performed by: STUDENT IN AN ORGANIZED HEALTH CARE EDUCATION/TRAINING PROGRAM

## 2021-02-09 PROCEDURE — NC001 PR NO CHARGE: Performed by: INTERNAL MEDICINE

## 2021-02-09 PROCEDURE — 80048 BASIC METABOLIC PNL TOTAL CA: CPT | Performed by: STUDENT IN AN ORGANIZED HEALTH CARE EDUCATION/TRAINING PROGRAM

## 2021-02-09 PROCEDURE — 82728 ASSAY OF FERRITIN: CPT | Performed by: STUDENT IN AN ORGANIZED HEALTH CARE EDUCATION/TRAINING PROGRAM

## 2021-02-09 RX ADMIN — DOXYCYCLINE 100 MG: 100 CAPSULE ORAL at 18:38

## 2021-02-09 RX ADMIN — ATORVASTATIN CALCIUM 40 MG: 40 TABLET, FILM COATED ORAL at 17:35

## 2021-02-09 RX ADMIN — CEFTRIAXONE SODIUM 1000 MG: 10 INJECTION, POWDER, FOR SOLUTION INTRAVENOUS at 05:14

## 2021-02-09 RX ADMIN — REMDESIVIR 100 MG: 100 INJECTION, POWDER, LYOPHILIZED, FOR SOLUTION INTRAVENOUS at 23:22

## 2021-02-09 RX ADMIN — LORATADINE 10 MG: 10 TABLET ORAL at 09:49

## 2021-02-09 RX ADMIN — OXYCODONE HYDROCHLORIDE AND ACETAMINOPHEN 1000 MG: 500 TABLET ORAL at 09:49

## 2021-02-09 RX ADMIN — DOXYCYCLINE 100 MG: 100 CAPSULE ORAL at 09:49

## 2021-02-09 RX ADMIN — ENOXAPARIN SODIUM 80 MG: 80 INJECTION SUBCUTANEOUS at 23:06

## 2021-02-09 RX ADMIN — METHOCARBAMOL 500 MG: 500 TABLET ORAL at 23:06

## 2021-02-09 RX ADMIN — ZINC SULFATE 220 MG (50 MG) CAPSULE 220 MG: CAPSULE at 09:49

## 2021-02-09 RX ADMIN — ENOXAPARIN SODIUM 80 MG: 80 INJECTION SUBCUTANEOUS at 09:49

## 2021-02-09 RX ADMIN — Medication 2000 UNITS: at 09:49

## 2021-02-09 RX ADMIN — OXYCODONE HYDROCHLORIDE AND ACETAMINOPHEN 1000 MG: 500 TABLET ORAL at 23:06

## 2021-02-09 RX ADMIN — DEXAMETHASONE SODIUM PHOSPHATE 6 MG: 4 INJECTION, SOLUTION INTRA-ARTICULAR; INTRALESIONAL; INTRAMUSCULAR; INTRAVENOUS; SOFT TISSUE at 05:14

## 2021-02-09 RX ADMIN — INSULIN LISPRO 2 UNITS: 100 INJECTION, SOLUTION INTRAVENOUS; SUBCUTANEOUS at 12:17

## 2021-02-09 RX ADMIN — FAMOTIDINE 20 MG: 20 TABLET ORAL at 09:49

## 2021-02-09 RX ADMIN — INSULIN LISPRO 2 UNITS: 100 INJECTION, SOLUTION INTRAVENOUS; SUBCUTANEOUS at 17:35

## 2021-02-09 RX ADMIN — INSULIN LISPRO 5 UNITS: 100 INJECTION, SOLUTION INTRAVENOUS; SUBCUTANEOUS at 09:50

## 2021-02-09 NOTE — CASE MANAGEMENT
LOS 2 Days  Not a Bundle  Not a Readmission  Unplanned Readmission Risk is 8 GREEN    VIJAY spoke with pt to discuss role of CM and d/c planning  Pt lives with his family in a 3 sh, 14ste to 2nd floor and 14 bakari to 3rd floor  No DME  Pt reports being IPTA  Denies hx of VNA, HHA, MH, D&A, or IP rehab  Primary Contact:  Yadira Estrada ([phone])-UELAINA  POA/LW-states he has one, CM requested documentation when able  Family to transport home    CM reviewed d/c planning process including the following: identifying help at home, patient preference for d/c planning needs, Discharge Lounge, [facility] Meds to Bed program, availability of treatment team to discuss questions or concerns patient and/or family may have regarding understanding medications and recognizing signs and symptoms once discharged  CM also encouraged patient to follow up with all recommended appointments after discharge  Patient advised of importance for patient and family to participate in managing patients medical well being

## 2021-02-09 NOTE — PLAN OF CARE
Problem: Potential for Falls  Goal: Patient will remain free of falls  Description: INTERVENTIONS:  - Assess patient frequently for physical needs  -  Identify cognitive and physical deficits and behaviors that affect risk of falls    -  [place] fall precautions as indicated by assessment   - Educate patient/family on patient safety including physical limitations  - Instruct patient to call for assistance with activity based on assessment  - Modify environment to reduce risk of injury  - Consider OT/PT consult to assist with strengthening/mobility  Outcome: Progressing     Problem: PAIN - ADULT  Goal: Verbalizes/displays adequate comfort level or baseline comfort level  Description: Interventions:  - Encourage patient to monitor pain and request assistance  - Assess pain using appropriate pain scale  - Administer analgesics based on type and severity of pain and evaluate response  - Implement non-pharmacological measures as appropriate and evaluate response  - Consider cultural and social influences on pain and pain management  - Notify physician/advanced practitioner if interventions unsuccessful or patient reports new pain  Outcome: Progressing     Problem: INFECTION - ADULT  Goal: Absence or prevention of progression during hospitalization  Description: INTERVENTIONS:  - Assess and monitor for signs and symptoms of infection  - Monitor lab/diagnostic results  - Monitor all insertion sites, i e  indwelling lines, tubes, and drains  - Monitor endotracheal if appropriate and nasal secretions for changes in amount and color  - [place] appropriate cooling/warming therapies per order  - Administer medications as ordered  - Instruct and encourage patient and family to use good hand hygiene technique  - Identify and instruct in appropriate isolation precautions for identified infection/condition  Outcome: Progressing     Problem: SAFETY ADULT  Goal: Patient will remain free of falls  Description: INTERVENTIONS:  - Assess patient frequently for physical needs  -  Identify cognitive and physical deficits and behaviors that affect risk of falls    -  [place] fall precautions as indicated by assessment   - Educate patient/family on patient safety including physical limitations  - Instruct patient to call for assistance with activity based on assessment  - Modify environment to reduce risk of injury  - Consider OT/PT consult to assist with strengthening/mobility  Outcome: Progressing  Goal: Maintain or return mobility status to optimal level  Description: INTERVENTIONS:  - Assess patient's baseline mobility status (ambulation, transfers, stairs, etc )    - Identify cognitive and physical deficits and behaviors that affect mobility  - Identify mobility aids required to assist with transfers and/or ambulation (gait belt, sit-to-stand, lift, walker, cane, etc )  - [place] fall precautions as indicated by assessment  - Record patient progress and toleration of activity level on Mobility SBAR; progress patient to next Phase/Stage  - Instruct patient to call for assistance with activity based on assessment  - Consider rehabilitation consult to assist with strengthening/weightbearing, etc   Outcome: Progressing     Problem: DISCHARGE PLANNING  Goal: Discharge to home or other facility with appropriate resources  Description: INTERVENTIONS:  - Identify barriers to discharge w/patient and caregiver  - Arrange for needed discharge resources and transportation as appropriate  - Identify discharge learning needs (meds, wound care, etc )  - Arrange for interpretive services to assist at discharge as needed  - Refer to Case Management Department for coordinating discharge planning if the patient needs post-hospital services based on physician/advanced practitioner order or complex needs related to functional status, cognitive ability, or social support system  Outcome: Progressing     Problem: Knowledge Deficit  Goal: Patient/family/caregiver demonstrates understanding of disease process, treatment plan, medications, and discharge instructions  Description: Complete learning assessment and assess knowledge base    Interventions:  - Provide teaching at level of understanding  - Provide teaching via preferred learning methods  Outcome: Progressing     Problem: GENITOURINARY - ADULT  Goal: Maintains or returns to baseline urinary function  Description: INTERVENTIONS:  - Assess urinary function  - Encourage oral fluids to ensure adequate hydration if ordered  - Administer IV fluids as ordered to ensure adequate hydration  - Administer ordered medications as needed  - Offer frequent toileting  - Follow urinary retention protocol if ordered  Outcome: Progressing  Goal: Absence of urinary retention  Description: INTERVENTIONS:  - Assess patients ability to void and empty bladder  - Monitor I/O  - Bladder scan as needed  - Discuss with physician/AP medications to alleviate retention as needed  - Discuss catheterization for long term situations as appropriate  Outcome: Progressing     Problem: METABOLIC, FLUID AND ELECTROLYTES - ADULT  Goal: Glucose maintained within target range  Description: INTERVENTIONS:  - Monitor Blood Glucose as ordered  - Assess for signs and symptoms of hyperglycemia and hypoglycemia  - Administer ordered medications to maintain glucose within target range  - Assess nutritional intake and initiate nutrition service referral as needed  Outcome: Progressing

## 2021-02-09 NOTE — PLAN OF CARE
Problem: Potential for Falls  Goal: Patient will remain free of falls  Description: INTERVENTIONS:  - Assess patient frequently for physical needs  -  Identify cognitive and physical deficits and behaviors that affect risk of falls    -  [place] fall precautions as indicated by assessment   - Educate patient/family on patient safety including physical limitations  - Instruct patient to call for assistance with activity based on assessment  - Modify environment to reduce risk of injury  - Consider OT/PT consult to assist with strengthening/mobility  Outcome: Progressing     Problem: PAIN - ADULT  Goal: Verbalizes/displays adequate comfort level or baseline comfort level  Description: Interventions:  - Encourage patient to monitor pain and request assistance  - Assess pain using appropriate pain scale  - Administer analgesics based on type and severity of pain and evaluate response  - Implement non-pharmacological measures as appropriate and evaluate response  - Consider cultural and social influences on pain and pain management  - Notify physician/advanced practitioner if interventions unsuccessful or patient reports new pain  Outcome: Progressing     Problem: INFECTION - ADULT  Goal: Absence or prevention of progression during hospitalization  Description: INTERVENTIONS:  - Assess and monitor for signs and symptoms of infection  - Monitor lab/diagnostic results  - Monitor all insertion sites, i e  indwelling lines, tubes, and drains  - Monitor endotracheal if appropriate and nasal secretions for changes in amount and color  - [place] appropriate cooling/warming therapies per order  - Administer medications as ordered  - Instruct and encourage patient and family to use good hand hygiene technique  - Identify and instruct in appropriate isolation precautions for identified infection/condition  Outcome: Progressing     Problem: SAFETY ADULT  Goal: Patient will remain free of falls  Description: INTERVENTIONS:  - Assess patient frequently for physical needs  -  Identify cognitive and physical deficits and behaviors that affect risk of falls    -  [place] fall precautions as indicated by assessment   - Educate patient/family on patient safety including physical limitations  - Instruct patient to call for assistance with activity based on assessment  - Modify environment to reduce risk of injury  - Consider OT/PT consult to assist with strengthening/mobility  Outcome: Progressing  Goal: Maintain or return mobility status to optimal level  Description: INTERVENTIONS:  - Assess patient's baseline mobility status (ambulation, transfers, stairs, etc )    - Identify cognitive and physical deficits and behaviors that affect mobility  - Identify mobility aids required to assist with transfers and/or ambulation (gait belt, sit-to-stand, lift, walker, cane, etc )  - [place] fall precautions as indicated by assessment  - Record patient progress and toleration of activity level on Mobility SBAR; progress patient to next Phase/Stage  - Instruct patient to call for assistance with activity based on assessment  - Consider rehabilitation consult to assist with strengthening/weightbearing, etc   Outcome: Progressing     Problem: DISCHARGE PLANNING  Goal: Discharge to home or other facility with appropriate resources  Description: INTERVENTIONS:  - Identify barriers to discharge w/patient and caregiver  - Arrange for needed discharge resources and transportation as appropriate  - Identify discharge learning needs (meds, wound care, etc )  - Arrange for interpretive services to assist at discharge as needed  - Refer to Case Management Department for coordinating discharge planning if the patient needs post-hospital services based on physician/advanced practitioner order or complex needs related to functional status, cognitive ability, or social support system  Outcome: Progressing     Problem: Knowledge Deficit  Goal: Patient/family/caregiver demonstrates understanding of disease process, treatment plan, medications, and discharge instructions  Description: Complete learning assessment and assess knowledge base  Interventions:  - Provide teaching at level of understanding  - Provide teaching via preferred learning methods  Outcome: Progressing     Problem: GENITOURINARY - ADULT  Goal: Maintains or returns to baseline urinary function  Description: INTERVENTIONS:  - Assess urinary function  - Encourage oral fluids to ensure adequate hydration if ordered  - Administer IV fluids as ordered to ensure adequate hydration  - Administer ordered medications as needed  - Offer frequent toileting  - Follow urinary retention protocol if ordered  Outcome: Progressing  Goal: Absence of urinary retention  Description: INTERVENTIONS:  - Assess patients ability to void and empty bladder  - Monitor I/O  - Bladder scan as needed  - Discuss with physician/AP medications to alleviate retention as needed  - Discuss catheterization for long term situations as appropriate  Outcome: Progressing     Problem: METABOLIC, FLUID AND ELECTROLYTES - ADULT  Goal: Glucose maintained within target range  Description: INTERVENTIONS:  - Monitor Blood Glucose as ordered  - Assess for signs and symptoms of hyperglycemia and hypoglycemia  - Administer ordered medications to maintain glucose within target range  - Assess nutritional intake and initiate nutrition service referral as needed  Outcome: Progressing     Problem: Nutrition/Hydration-ADULT  Goal: Nutrient/Hydration intake appropriate for improving, restoring or maintaining nutritional needs  Description: Monitor and assess patient's nutrition/hydration status for malnutrition  Collaborate with interdisciplinary team and initiate plan and interventions as ordered  Monitor patient's weight and dietary intake as ordered or per policy  Utilize nutrition screening tool and intervene as necessary   Determine patient's food preferences and provide high-protein, high-caloric foods as appropriate       INTERVENTIONS:  - Monitor oral intake, urinary output, labs, and treatment plans  - Assess nutrition and hydration status and recommend course of action  - Evaluate amount of meals eaten  - Assist patient with eating if necessary   - Allow adequate time for meals  - Recommend/ encourage appropriate diets, oral nutritional supplements, and vitamin/mineral supplements  - Order, calculate, and assess calorie counts as needed  - Recommend, monitor, and adjust tube feedings and TPN/PPN based on assessed needs  - Assess need for intravenous fluids  - Provide specific nutrition/hydration education as appropriate  - Include patient/family/caregiver in decisions related to nutrition  Outcome: Progressing

## 2021-02-09 NOTE — PROGRESS NOTES
INTERNAL MEDICINE RESIDENCY PROGRESS NOTE     PATIENT INFORMATION     Name: Aba Chatterjee   Age & Sex: 78 y o  male   MRN: [id number]  Hospital Stay Days: 2  Unit/Bed#: -01   Encounter: [id number]  Team: SOD Team A    ASSESSMENT/PLAN     Principal Problem:    Pneumonia due to COVID-19 virus  Active Problems:    Hematuria    CASE (acute kidney injury) ([facility] )    Allergic rhinitis    Chronic cough    Neck pain    DM (diabetes mellitus), type 2 ([place] [address] )    DM (diabetes mellitus), type 2 [facility])  Assessment & Plan  Lab Results   Component Value Date    HGBA1C 6 9 (H) 02/07/2021       Recent Labs     02/08/21  1143 02/08/21  1642 02/09/21  0956 02/09/21  1155   POCGLU 306* 178* 316* 203*       Blood Sugar Average: Last 72 hrs:  (P) 228 8   Plan:  -Continue SSI and monitor BGL    Neck pain  Assessment & Plan  Chronic neck pain described as stiffness/tightness x several months without any known injury or inciting event  Denies radiation of pain or weakness in his upper extremities  States the pain is primarily worse with turning his head  Recently started on meloxicam and robaxin for next pain, starting taking these medications 1 day PTA  Imaging:  CT cervical spine without any  fracture or traumatic subluxation  Straightening of the cervical lordosis could suggest component of muscle spasm  Small to moderate multilevel spondylosis  No high-grade neural impingement or significant change since 2014  Plan:  -Meloxicam held in the setting of CASE  -Continue robaxin  -Likely would benefit from neck stretches and possibly outpatient PT    Chronic cough  Assessment & Plan  Likely in the setting of post-nasal drip    Plan:  -Continue flonase and claritin    Allergic rhinitis  Assessment & Plan  Currently stable   Recently started on flonase and claritin by PCP    Plan:  -Continue flonase and claritin      CASE (acute kidney injury) ([facility] )  Assessment & Plan  CASE with Cr of 1 42 on admission (baseline Cr 1) likely in the setting of decreased PO intake x 2 days and possibly with component of obstruction as CT imaging showing nodular prostatic hypertrophy with median lobe nodules causing significant mass effect on the urinary bladder  Plan:  -Resolved  -S/p 1L bolus of NS in ED  -Completed IV fluids with isolyte 100cc/hr x 15 hrs   -Monitor BMP  -Urology following       Hematuria  Assessment & Plan  Episode of painless hematuria noted in the morning 2/7, not associated with fever, chills, dysuria, suprapubic or back pain     UA with figueroa colored urine with protein and large blood, no signs of infection  Hematuria possibly related to BPH vs COVID 19 infection    Plan:  -Urine has been clear since admission  -Urology consulted -> will arrange outpatient follow up and future cystoscopy   -Hb stable  -Continue to monitor Hb and for signs of hematuria    * Pneumonia due to COVID-19 virus  Assessment & Plan  Pt with hx of chronic cough likely from post nasal drip, cough worsening this past week with increased fatigue, lightheadedness and decreased PO intake  Tested positive for COVID 19 on 02/07 with CT chest remarkable for multifocal peripheral opacities through without lungs consistent with COVID 19 pneumonia   Afebrile and hemodynamically stable on exam with SpO2 of 94% on RA, except for mild tachypnea    Risk factors: age >71, DM2    Plan:  - Plasma and Blood type & screen ordered 2/8  - Cardiac markers:Troponin <0 02, , BNP 9  - Inflammatory markers: CRP 97 2, D-Dimer 5 46, ferritin 966  - Vit D3 2000 IU daily, Vitamin C 1 g PO Q12H, and Zinc 220 mg PO daily x 7 days, followed by multivitamin  - Patient currently on 2 L nasal cannula -> continue to wean  - Continue famotidine and dexamethasone  - Received Remdesivir 200mg IV x 1 on 2/7, followed by 100mg IV daily x 4 days  - Spoke with Urology and restarted Lovenox 2/8  - OOB TID, ambulation & mobilization encouraged  - Encourage incentive spirometry use  - Plan for possible DC tomorrow if patient is stable on room air      Disposition: Inpatient     SUBJECTIVE     Patient seen and examined  No acute events overnight  Today the patient continues to complain of a nonproductive cough and mild shortness of breath  He is currently on 2 L NS  He also reports mild chest pain associated with his cough  He also is experiencing a mild resting tremor at rest L>R  He states that he has had this tremor for the past 2 weeks  Aside from this he had no other acute complaints  He reports no blood in his urine since prior to admission  OBJECTIVE     Vitals:    21 1845 21 2335 21 0413 21 0732   BP: 118/71 118/71 119/70 117/69   BP Location:    Left arm   Pulse: 65 62 59 59   Resp: 17   18   Temp: 99 °F (37 2 °C)  97 9 °F (36 6 °C) 97 8 °F (36 6 °C)   TempSrc: Oral   Oral   SpO2: 94% (!) 87% (!) 89% 95%   Weight:       Height:          Temperature:   Temp (24hrs), Av 4 °F (36 9 °C), Min:97 8 °F (36 6 °C), Max:99 °F (37 2 °C)    Temperature: 97 8 °F (36 6 °C)  Intake & Output:  I/O        07 -  0700 701 -  07 - 02/10 0700    I V  (mL/kg)  200 (2 6)     Blood  250     IV Piggyback 250 250     Total Intake(mL/kg) 250 700 (8 9)     Urine (mL/kg/hr)  350 (0 2)     Total Output  350     Net +250 +350                  Intake/Output Summary (Last 24 hours) at 2021 1218  Last data filed at 2021 2128  Gross per 24 hour   Intake 500 ml   Output 350 ml   Net 150 ml     No intake/output data recorded  Weights:   IBW: 63 8 kg    Body mass index is 27 86 kg/m²  Weight (last 2 days)     Date/Time   Weight    21 1304   78 3 (172 62)            Physical Exam  Constitutional:       Appearance: He is well-developed  HENT:      Head: Normocephalic and atraumatic  Nose: Nose normal    Eyes:      General:         Right eye: No discharge  Left eye: No discharge        Conjunctiva/sclera: Conjunctivae normal       Pupils: Pupils are equal, round, and reactive to light  Neck:      Thyroid: No thyromegaly  Cardiovascular:      Rate and Rhythm: Normal rate and regular rhythm  Heart sounds: Normal heart sounds  No murmur  No friction rub  No gallop  Pulmonary:      Effort: Pulmonary effort is normal  No respiratory distress  Breath sounds: No stridor  Rales present  No wheezing  Comments: Cough with deep breaths  Chest:      Chest wall: No tenderness  Abdominal:      General: Bowel sounds are normal  There is no distension  Palpations: Abdomen is soft  There is no mass  Tenderness: There is no abdominal tenderness  There is no guarding or rebound  Musculoskeletal:         General: No swelling or deformity  Right lower leg: No edema  Left lower leg: No edema  Lymphadenopathy:      Cervical: No cervical adenopathy  Skin:     General: Skin is warm and dry  Neurological:      Mental Status: He is alert and oriented to person, place, and time  Comments: Resting hand tremor L>R   Psychiatric:         Mood and Affect: Mood normal          Behavior: Behavior normal          Thought Content: Thought content normal          Judgment: Judgment normal         LABORATORY DATA     Labs: I have personally reviewed pertinent reports      Results from last 7 days   Lab Units 02/09/21  0709 02/08/21  0513 02/07/21  1424   WBC Thousand/uL 4 52 3 60* 4 80   HEMOGLOBIN g/dL 12 1 12 2 14 0   HEMATOCRIT % 37 8 37 9 43 7   PLATELETS Thousands/uL 172 150 151   NEUTROS PCT %  --   --  83*   MONOS PCT %  --   --  4      Results from last 7 days   Lab Units 02/09/21  0709 02/08/21  0513 02/07/21  1424   POTASSIUM mmol/L 4 4 3 9 4 1   CHLORIDE mmol/L 110* 108 105   CO2 mmol/L 27 27 29   BUN mg/dL 25 19 21   CREATININE mg/dL 1 02 1 11 1 42*   CALCIUM mg/dL 8 5 8 2* 8 8   ALK PHOS U/L  --   --  37*   ALT U/L  --   --  39   AST U/L  --   --  76*     Serum creatinine: 1 02 mg/dL 02/09/21 0709  Estimated creatinine clearance: 68 2 mL/min                Results from last 7 days   Lab Units 02/07/21  1938   LACTIC ACID mmol/L 0 9     Results from last 7 days   Lab Units 02/07/21  1424   TROPONIN I ng/mL <0 02       IMAGING & DIAGNOSTIC TESTING     Radiology Results: I have personally reviewed pertinent reports  Ct Head Without Contrast    Result Date: 2/7/2021  Impression: No acute intracranial abnormality  Workstation performed: [id number]     Ct Spine Cervical Without Contrast    Addendum Date: 2/7/2021    ADDENDUM: Comparison made to cervical spine CT from 11/21/2014  Result Date: 2/7/2021  Impression: 1  No cervical spine fracture or traumatic subluxation  2   Straightening of the cervical lordosis could suggest component of muscle spasm  3   Small to moderate multilevel spondylosis  No high-grade neural impingement or significant change since 2014  Workstation performed: [id number]     Ct Chest Abdomen Pelvis W Contrast    Result Date: 2/7/2021  Impression: 1  Multifocal peripheral opacities through without lungs  In the setting of clinically suspected COVID-19, the above lung parenchymal findings on CT indicate intermediate to high confidence level for COVID-19 pneumonia  Parenchymal findings are moderate  in degree  2   No acute findings in the abdomen or pelvis  The study was marked in [facility] for immediate notification  Workstation performed: [id number]     Other Diagnostic Testing: I have personally reviewed pertinent reports        ACTIVE MEDICATIONS     Current Facility-Administered Medications   Medication Dose Route Frequency    acetaminophen (TYLENOL) tablet 650 mg  650 mg Oral Q6H PRN    ascorbic acid (VITAMIN C) tablet 1,000 mg  1,000 mg Oral Q12H [facility]    atorvastatin (LIPITOR) tablet 40 mg  40 mg Oral Daily With Dinner    benzonatate (TESSALON PERLES) capsule 200 mg  200 mg Oral TID PRN    ceftriaxone (ROCEPHIN) 1 g/50 mL in dextrose IVPB  1,000 mg Intravenous Q24H    cholecalciferol (VITAMIN D3) tablet 2,000 Units 2,000 Units Oral Daily    dexamethasone (DECADRON) injection 6 mg  6 mg Intravenous Q24H    doxycycline hyclate (VIBRAMYCIN) capsule 100 mg  100 mg Oral Q12H [address]    enoxaparin (LOVENOX) subcutaneous injection 80 mg  1 mg/kg Subcutaneous Q12H [address]    famotidine (PEPCID) tablet 20 mg  20 mg Oral Daily    fluticasone (FLONASE) 50 mcg/act nasal spray 2 spray  2 spray Nasal Daily    insulin lispro (HumaLOG) 100 units/mL subcutaneous injection 1-6 Units  1-6 Units Subcutaneous TID AC    loratadine (CLARITIN) tablet 10 mg  10 mg Oral Daily    methocarbamol (ROBAXIN) tablet 500 mg  500 mg Oral HS    zinc sulfate (ZINCATE) capsule 220 mg  220 mg Oral Daily    Followed by   Zoey Muñoz ON 2/15/2021] multivitamin-minerals (CENTRUM ADULTS) tablet 1 tablet  1 tablet Oral Daily    remdesivir (Veklury) 100 mg in sodium chloride 0 9 % 250 mL IVPB  100 mg Intravenous Q24H     VTE Pharmacologic Prophylaxis: Enoxaparin (Lovenox)  VTE Mechanical Prophylaxis: sequential compression device    ==  Morgan Lepe MD  IM Resident, PGY-1  [facility]

## 2021-02-10 ENCOUNTER — APPOINTMENT (INPATIENT)
Dept: NON INVASIVE DIAGNOSTICS | Facility: HOSPITAL | Age: 71
DRG: 177 | End: 2021-02-10
Payer: MEDICARE

## 2021-02-10 ENCOUNTER — APPOINTMENT (INPATIENT)
Dept: RADIOLOGY | Facility: HOSPITAL | Age: 71
DRG: 177 | End: 2021-02-10
Payer: MEDICARE

## 2021-02-10 PROBLEM — R25.1 TREMOR OF BOTH HANDS: Status: ACTIVE | Noted: 2021-02-10

## 2021-02-10 LAB
ANION GAP SERPL CALCULATED.3IONS-SCNC: 5 MMOL/L (ref 4–13)
BASOPHILS # BLD AUTO: 0.01 THOUSANDS/ΜL (ref 0–0.1)
BASOPHILS NFR BLD AUTO: 0 % (ref 0–1)
BUN SERPL-MCNC: 28 MG/DL (ref 5–25)
CALCIUM SERPL-MCNC: 8.7 MG/DL (ref 8.3–10.1)
CHLORIDE SERPL-SCNC: 109 MMOL/L (ref 100–108)
CO2 SERPL-SCNC: 24 MMOL/L (ref 21–32)
CREAT SERPL-MCNC: 0.96 MG/DL (ref 0.6–1.3)
D DIMER PPP FEU-MCNC: 1.35 UG/ML FEU
EOSINOPHIL # BLD AUTO: 0 THOUSAND/ΜL (ref 0–0.61)
EOSINOPHIL NFR BLD AUTO: 0 % (ref 0–6)
ERYTHROCYTE [DISTWIDTH] IN BLOOD BY AUTOMATED COUNT: 13 % (ref 11.6–15.1)
GFR SERPL CREATININE-BSD FRML MDRD: 94 ML/MIN/1.73SQ M
GLUCOSE SERPL-MCNC: 147 MG/DL (ref 65–140)
GLUCOSE SERPL-MCNC: 154 MG/DL (ref 65–140)
GLUCOSE SERPL-MCNC: 164 MG/DL (ref 65–140)
GLUCOSE SERPL-MCNC: 253 MG/DL (ref 65–140)
HCT VFR BLD AUTO: 40.1 % (ref 36.5–49.3)
HGB BLD-MCNC: 12.9 G/DL (ref 12–17)
IMM GRANULOCYTES # BLD AUTO: 0.11 THOUSAND/UL (ref 0–0.2)
IMM GRANULOCYTES NFR BLD AUTO: 1 % (ref 0–2)
LYMPHOCYTES # BLD AUTO: 0.61 THOUSANDS/ΜL (ref 0.6–4.47)
LYMPHOCYTES NFR BLD AUTO: 8 % (ref 14–44)
MCH RBC QN AUTO: 26 PG (ref 26.8–34.3)
MCHC RBC AUTO-ENTMCNC: 32.2 G/DL (ref 31.4–37.4)
MCV RBC AUTO: 81 FL (ref 82–98)
MONOCYTES # BLD AUTO: 0.35 THOUSAND/ΜL (ref 0.17–1.22)
MONOCYTES NFR BLD AUTO: 5 % (ref 4–12)
NEUTROPHILS # BLD AUTO: 6.69 THOUSANDS/ΜL (ref 1.85–7.62)
NEUTS SEG NFR BLD AUTO: 86 % (ref 43–75)
NRBC BLD AUTO-RTO: 0 /100 WBCS
PLATELET # BLD AUTO: 224 THOUSANDS/UL (ref 149–390)
PMV BLD AUTO: 10.9 FL (ref 8.9–12.7)
POTASSIUM SERPL-SCNC: 5 MMOL/L (ref 3.5–5.3)
PROCALCITONIN SERPL-MCNC: 0.27 NG/ML
RBC # BLD AUTO: 4.97 MILLION/UL (ref 3.88–5.62)
SODIUM SERPL-SCNC: 138 MMOL/L (ref 136–145)
WBC # BLD AUTO: 7.77 THOUSAND/UL (ref 4.31–10.16)

## 2021-02-10 PROCEDURE — 71275 CT ANGIOGRAPHY CHEST: CPT

## 2021-02-10 PROCEDURE — 93970 EXTREMITY STUDY: CPT | Performed by: SURGERY

## 2021-02-10 PROCEDURE — G1004 CDSM NDSC: HCPCS

## 2021-02-10 PROCEDURE — 99232 SBSQ HOSP IP/OBS MODERATE 35: CPT | Performed by: INTERNAL MEDICINE

## 2021-02-10 PROCEDURE — 84145 PROCALCITONIN (PCT): CPT | Performed by: INTERNAL MEDICINE

## 2021-02-10 PROCEDURE — 93970 EXTREMITY STUDY: CPT

## 2021-02-10 PROCEDURE — 82948 REAGENT STRIP/BLOOD GLUCOSE: CPT

## 2021-02-10 PROCEDURE — 94761 N-INVAS EAR/PLS OXIMETRY MLT: CPT

## 2021-02-10 PROCEDURE — 80048 BASIC METABOLIC PNL TOTAL CA: CPT | Performed by: INTERNAL MEDICINE

## 2021-02-10 PROCEDURE — 85379 FIBRIN DEGRADATION QUANT: CPT | Performed by: STUDENT IN AN ORGANIZED HEALTH CARE EDUCATION/TRAINING PROGRAM

## 2021-02-10 PROCEDURE — 85025 COMPLETE CBC W/AUTO DIFF WBC: CPT | Performed by: INTERNAL MEDICINE

## 2021-02-10 RX ADMIN — LORATADINE 10 MG: 10 TABLET ORAL at 09:31

## 2021-02-10 RX ADMIN — DOXYCYCLINE 100 MG: 100 CAPSULE ORAL at 09:31

## 2021-02-10 RX ADMIN — METHOCARBAMOL 500 MG: 500 TABLET ORAL at 22:14

## 2021-02-10 RX ADMIN — REMDESIVIR 100 MG: 100 INJECTION, POWDER, LYOPHILIZED, FOR SOLUTION INTRAVENOUS at 19:31

## 2021-02-10 RX ADMIN — CEFTRIAXONE SODIUM 1000 MG: 10 INJECTION, POWDER, FOR SOLUTION INTRAVENOUS at 04:38

## 2021-02-10 RX ADMIN — FLUTICASONE PROPIONATE 2 SPRAY: 50 SPRAY, METERED NASAL at 09:25

## 2021-02-10 RX ADMIN — ATORVASTATIN CALCIUM 40 MG: 40 TABLET, FILM COATED ORAL at 15:52

## 2021-02-10 RX ADMIN — ENOXAPARIN SODIUM 80 MG: 80 INJECTION SUBCUTANEOUS at 22:14

## 2021-02-10 RX ADMIN — INSULIN LISPRO 1 UNITS: 100 INJECTION, SOLUTION INTRAVENOUS; SUBCUTANEOUS at 09:32

## 2021-02-10 RX ADMIN — DOXYCYCLINE 100 MG: 100 CAPSULE ORAL at 19:31

## 2021-02-10 RX ADMIN — ENOXAPARIN SODIUM 80 MG: 80 INJECTION SUBCUTANEOUS at 09:31

## 2021-02-10 RX ADMIN — DEXAMETHASONE SODIUM PHOSPHATE 6 MG: 4 INJECTION, SOLUTION INTRA-ARTICULAR; INTRALESIONAL; INTRAMUSCULAR; INTRAVENOUS; SOFT TISSUE at 04:38

## 2021-02-10 RX ADMIN — OXYCODONE HYDROCHLORIDE AND ACETAMINOPHEN 1000 MG: 500 TABLET ORAL at 22:14

## 2021-02-10 RX ADMIN — FAMOTIDINE 20 MG: 20 TABLET ORAL at 09:31

## 2021-02-10 RX ADMIN — OXYCODONE HYDROCHLORIDE AND ACETAMINOPHEN 1000 MG: 500 TABLET ORAL at 09:32

## 2021-02-10 RX ADMIN — Medication 2000 UNITS: at 09:32

## 2021-02-10 RX ADMIN — ZINC SULFATE 220 MG (50 MG) CAPSULE 220 MG: CAPSULE at 09:31

## 2021-02-10 RX ADMIN — IOHEXOL 85 ML: 350 INJECTION, SOLUTION INTRAVENOUS at 20:52

## 2021-02-10 NOTE — RESPIRATORY THERAPY NOTE
Home Oxygen Qualifying Test       Patient name: Jose Sher        : 1950   Date of Test:  February 10, 2021  Diagnosis:      Home Oxygen Test:    **Medicare Guidelines require item(s) 1-5 on all ambulatory patients or 1 and 2 on non-ambulatory patients  1   Baseline SPO2 on Room Air at rest 94 %  2   SPO2 during exercise on Room Air 86 %  During exercise monitor SpO2  If SPO2 increases >=89% with ambulation do not add supplemental             oxygen  If <= 88% on room air add O2 via NC and titrate patient  Patient must be ambulated with O2 and titrated to > 88% with exertion  3   SPO2 on Oxygen at rest 92 % 3 lpm     4   SPO2 during exercise on Oxygen  90% a liter flow of 3 lpm     5   Exercise performed:          walking, duration 6 (min), distance 440 (feet)          [x]  Supplemental Home Oxygen is indicated  []  Client does not qualify for home oxygen  Respiratory Additional Notes- Patient walked for 6 minutes time with good effort and cooperation  Lowest SpO2 value noted while walking on room air was 86%  Oxygen was added and titrated to 3 lpm while walking to maintain SpO2 of >88% with exertion, walking  Home oxygen is indicated      Yogi Shukla

## 2021-02-10 NOTE — PROGRESS NOTES
INTERNAL MEDICINE RESIDENCY PROGRESS NOTE     PATIENT INFORMATION     Name: Ba Holt   Age & Sex: 78 y o  male   MRN: [id number]  Hospital Stay Days: 3  Unit/Bed#: -01   Encounter: [id number]  Team: SOD Team A    ASSESSMENT/PLAN     Principal Problem:    Pneumonia due to COVID-19 virus  Active Problems:    Hematuria    CASE (acute kidney injury) ([address] )    Allergic rhinitis    Chronic cough    Neck pain    DM (diabetes mellitus), type 2 ([facility])    Tremor of both hands    Tremor of both hands  Assessment & Plan  Bilateral resting tremor, L>R  Patient reports that it started about two weeks ago when he began experiencing upper respiratory COVID symptoms  · No focal neurological deficits  · TSH was normal  · Spoke with neurology and they do not believe that it is related to [place]  · Recommend outpatient follow-up with Neurology       DM (diabetes mellitus), type 2 [facility])  Assessment & Plan  Lab Results   Component Value Date    HGBA1C 6 9 (H) 02/07/2021       Recent Labs     02/09/21  1608 02/09/21  2132 02/10/21  0712 02/10/21  1125   POCGLU 198* 197* 154* 253*       Blood Sugar Average: Last 72 hrs:  (P) 216 [id number]   Plan:  -Continue SSI and monitor BGL    Neck pain  Assessment & Plan  Chronic neck pain described as stiffness/tightness x several months without any known injury or inciting event  Denies radiation of pain or weakness in his upper extremities  States the pain is primarily worse with turning his head  Recently started on meloxicam and robaxin for next pain, starting taking these medications 1 day PTA  Imaging:  CT cervical spine without any  fracture or traumatic subluxation  Straightening of the cervical lordosis could suggest component of muscle spasm  Small to moderate multilevel spondylosis  No high-grade neural impingement or significant change since 2014      Plan:  -Meloxicam held in the setting of CASE -> will restart at DC  -Continue robaxin  -Likely would benefit from neck stretches and possibly outpatient PT    Chronic cough  Assessment & Plan  Likely in the setting of post-nasal drip    Plan:  -Continue flonase and claritin    Allergic rhinitis  Assessment & Plan  Currently stable  Recently started on flonase and claritin by PCP    Plan:  -Continue flonase and claritin      CASE (acute kidney injury) ([facility] [address] )  Assessment & Plan  CASE with Cr of 1 42 on admission (baseline Cr 1) likely in the setting of decreased PO intake x 2 days and possibly with component of obstruction as CT imaging showing nodular prostatic hypertrophy with median lobe nodules causing significant mass effect on the urinary bladder  Plan:  -Resolved  -S/p 1L bolus of NS in ED  -Completed IV fluids with isolyte 100cc/hr x 15 hrs   -Monitor BMP       Hematuria  Assessment & Plan  Episode of painless hematuria noted in the morning 2/7, not associated with fever, chills, dysuria, suprapubic or back pain     UA with figueroa colored urine with protein and large blood, no signs of infection  Hematuria possibly related to BPH vs COVID 19 infection    Plan:  -Urine has been clear since admission  -Urology consulted -> will arrange outpatient follow up and future cystoscopy   -Hb stable  -Continue to monitor Hb and for signs of hematuria    * Pneumonia due to COVID-19 virus  Assessment & Plan  Pt with hx of chronic cough likely from post nasal drip, cough worsening this past week with increased fatigue, lightheadedness and decreased PO intake  Tested positive for COVID 19 on 02/07 with CT chest remarkable for multifocal peripheral opacities through without lungs consistent with COVID 19 pneumonia   Afebrile and hemodynamically stable on exam with SpO2 of 94% on RA, except for mild tachypnea    Risk factors: age >71, DM2    Plan:  - Plasma and Blood type & screen ordered 2/8  - Cardiac markers:Troponin <0 02, , BNP 9  - Inflammatory markers: CRP 97 2, D-Dimer 5 46, ferritin 966  - Vit D3 2000 IU daily, Vitamin C 1 g PO Q12H, and Zinc 220 mg PO daily x 7 days, followed by multivitamin  - Patient currently on 2 L nasal cannula -> continue to wean  - Continue famotidine and dexamethasone  - Received Remdesivir 200mg IV x 1 on , followed by 100mg IV daily x 4 days  - Spoke with Urology and restarted Lovenox   - OOB TID, ambulation & mobilization encouraged  - Encourage incentive spirometry use  - Home O2 eval ordered -> requires 3L O2  - Lower extremity VAS doppler ordered -> if negative will order CTA to rule out PE  - Plan for possible DC tomorrow if patient is stable       Disposition: Inpatient     SUBJECTIVE     Patient seen and examined  No acute events overnight  Today overall the patient feels well  He states that his shortness of breath and his cough have been progressively improving  He also states that his hand tremor has improved as well  He has not seen any blood in his urine since being admitted  He was evaluated by RT this morning and required 3L O2  Aside from this he had no other acute complaints  OBJECTIVE     Vitals:    02/10/21 0714 02/10/21 0928 02/10/21 1215 02/10/21 1554   BP: 122/66   138/78   BP Location: Right arm      Pulse: (!) 54 60     Resp: 20      Temp: 98 °F (36 7 °C)   (!) 97 3 °F (36 3 °C)   TempSrc: Oral      SpO2: 94% 96% (!) 86%    Weight:       Height:          Temperature:   Temp (24hrs), Av 8 °F (36 6 °C), Min:97 3 °F (36 3 °C), Max:98 2 °F (36 8 °C)    Temperature: (!) 97 3 °F (36 3 °C)  Intake & Output:  I/O        07 -  0700  07 - 02/10 0700 02/10 07 -  0700    P  O   118 240    I V  (mL/kg) 200 (2 6)      Blood 250      IV Piggyback 250      Total Intake(mL/kg) 700 (8 9) 118 (1 5) 240 (3 1)    Urine (mL/kg/hr) 350 (0 2)      Total Output 350      Net +350 +118 +240                 Intake/Output Summary (Last 24 hours) at 2/10/2021 1559  Last data filed at 2/10/2021 1200  Gross per 24 hour   Intake 478 ml   Output --   Net 478 ml     I/O this shift:  In: 360 [P O :360]  Out: -   Weights:   IBW: 63 8 kg    Body mass index is 27 86 kg/m²  Weight (last 2 days)     Date/Time   Weight    02/08/21 1304   78 3 (172 62)            Physical Exam  Constitutional:       Appearance: He is well-developed  HENT:      Head: Normocephalic and atraumatic  Nose: Nose normal    Eyes:      General:         Right eye: No discharge  Left eye: No discharge  Conjunctiva/sclera: Conjunctivae normal       Pupils: Pupils are equal, round, and reactive to light  Neck:      Thyroid: No thyromegaly  Cardiovascular:      Rate and Rhythm: Normal rate and regular rhythm  Heart sounds: Normal heart sounds  No murmur  No friction rub  No gallop  Pulmonary:      Effort: Pulmonary effort is normal  No respiratory distress  Breath sounds: No stridor  Rales present  No wheezing  Chest:      Chest wall: No tenderness  Abdominal:      General: Bowel sounds are normal  There is no distension  Palpations: Abdomen is soft  There is no mass  Tenderness: There is no abdominal tenderness  There is no guarding or rebound  Musculoskeletal:         General: No swelling or deformity  Right lower leg: No edema  Left lower leg: No edema  Lymphadenopathy:      Cervical: No cervical adenopathy  Skin:     General: Skin is warm and dry  Neurological:      Mental Status: He is alert and oriented to person, place, and time  Comments: Mild bilateral hand tremor L>R   Psychiatric:         Mood and Affect: Mood normal          Behavior: Behavior normal          Thought Content: Thought content normal          Judgment: Judgment normal         LABORATORY DATA     Labs: I have personally reviewed pertinent reports      Results from last 7 days   Lab Units 02/10/21  1055 02/09/21  0709 02/08/21  0513 02/07/21  1424   WBC Thousand/uL 7 77 4 52 3 60* 4 80   HEMOGLOBIN g/dL 12 9 12 1 12 2 14 0   HEMATOCRIT % 40 1 37 8 37 9 43 7   PLATELETS Thousands/uL 224 172 150 151   NEUTROS PCT % 86*  --   --  83*   MONOS PCT % 5  --   --  4      Results from last 7 days   Lab Units 02/10/21  0539 02/09/21  0709 02/08/21  0513 02/07/21  1424   POTASSIUM mmol/L 5 0 4 4 3 9 4 1   CHLORIDE mmol/L 109* 110* 108 105   CO2 mmol/L 24 27 27 29   BUN mg/dL 28* 25 19 21   CREATININE mg/dL 0 96 1 02 1 11 1 42*   CALCIUM mg/dL 8 7 8 5 8 2* 8 8   ALK PHOS U/L  --   --   --  37*   ALT U/L  --   --   --  39   AST U/L  --   --   --  76*     Serum creatinine: 0 96 mg/dL 02/10/21 0539  Estimated creatinine clearance: 72 5 mL/min                Results from last 7 days   Lab Units 02/07/21  1938   LACTIC ACID mmol/L 0 9     Results from last 7 days   Lab Units 02/07/21  1424   TROPONIN I ng/mL <0 02       IMAGING & DIAGNOSTIC TESTING     Radiology Results: I have personally reviewed pertinent reports  Ct Head Without Contrast    Result Date: 2/7/2021  Impression: No acute intracranial abnormality  Workstation performed: [id number]     Ct Spine Cervical Without Contrast    Addendum Date: 2/7/2021    ADDENDUM: Comparison made to cervical spine CT from 11/21/2014  Result Date: 2/7/2021  Impression: 1  No cervical spine fracture or traumatic subluxation  2   Straightening of the cervical lordosis could suggest component of muscle spasm  3   Small to moderate multilevel spondylosis  No high-grade neural impingement or significant change since 2014  Workstation performed: [id number]     Ct Chest Abdomen Pelvis W Contrast    Result Date: 2/7/2021  Impression: 1  Multifocal peripheral opacities through without lungs  In the setting of clinically suspected COVID-19, the above lung parenchymal findings on CT indicate intermediate to high confidence level for COVID-19 pneumonia  Parenchymal findings are moderate  in degree  2   No acute findings in the abdomen or pelvis  The study was marked in [facility] for immediate notification   Workstation performed: [id number]     Other Diagnostic Testing: I have personally reviewed pertinent reports        ACTIVE MEDICATIONS     Current Facility-Administered Medications   Medication Dose Route Frequency    acetaminophen (TYLENOL) tablet 650 mg  650 mg Oral Q6H PRN    ascorbic acid (VITAMIN C) tablet 1,000 mg  1,000 mg Oral Q12H [facility]    atorvastatin (LIPITOR) tablet 40 mg  40 mg Oral Daily With Dinner    benzonatate (TESSALON PERLES) capsule 200 mg  200 mg Oral TID PRN    ceftriaxone (ROCEPHIN) 1 g/50 mL in dextrose IVPB  1,000 mg Intravenous Q24H    cholecalciferol (VITAMIN D3) tablet 2,000 Units  2,000 Units Oral Daily    dexamethasone (DECADRON) injection 6 mg  6 mg Intravenous Q24H    doxycycline hyclate (VIBRAMYCIN) capsule 100 mg  100 mg Oral Q12H [facility]    enoxaparin (LOVENOX) subcutaneous injection 80 mg  1 mg/kg Subcutaneous Q12H ILDA    famotidine (PEPCID) tablet 20 mg  20 mg Oral Daily    fluticasone (FLONASE) 50 mcg/act nasal spray 2 spray  2 spray Nasal Daily    insulin lispro (HumaLOG) 100 units/mL subcutaneous injection 1-6 Units  1-6 Units Subcutaneous TID AC    loratadine (CLARITIN) tablet 10 mg  10 mg Oral Daily    methocarbamol (ROBAXIN) tablet 500 mg  500 mg Oral HS    zinc sulfate (ZINCATE) capsule 220 mg  220 mg Oral Daily    Followed by   Venita Comer ON 2/15/2021] multivitamin-minerals (CENTRUM ADULTS) tablet 1 tablet  1 tablet Oral Daily    remdesivir (Veklury) 100 mg in sodium chloride 0 9 % 250 mL IVPB  100 mg Intravenous Q24H     VTE Pharmacologic Prophylaxis: Enoxaparin (Lovenox)  VTE Mechanical Prophylaxis: sequential compression device    ==  Lola Catalan MD  IM Resident, PGY-1  [facility]

## 2021-02-10 NOTE — UTILIZATION REVIEW
Initial Clinical Review     Admission: Date/Time/Statement:       Admission Orders (From admission, onward)              Ordered          02/07/21 1806   Inpatient Admission  Once                             Orders Placed This Encounter   Procedures    Inpatient Admission       Standing Status:   Standing       Number of Occurrences:   1       Order Specific Question:   Level of Care       Answer:   Med Surg [16]       Order Specific Question:   Estimated length of stay       Answer:   More than 2 Midnights       Order Specific Question:   Certification       Answer:   I certify that inpatient services are medically necessary for this patient for a duration of greater than two midnights  See H&P and MD Progress Notes for additional information about the patient's course of treatment                 ED Arrival Information      Expected Arrival Acuity Means of Arrival Escorted By Service Admission Type     - 2/7/2021 13:01 Urgent Walk-In Self General Medicine Urgent     Arrival Complaint     Blood in Urine               Chief Complaint   Patient presents with    Blood in Urine       pt presents ambulatory with c/o hematuria since this morning  denies dysuria/flank pain, reports "sometimes" suprapubic pain, +urinary urgency      Assessment/Plan: 77 yo m with a pmh of DM2  He presents to [facility], with cough, weakness , fatigue , dizziness and tremulousness, as well as productive cough, white sputum  His son reports decreased po intake  Today he developed hematuria with robin blood with clots during urination  He reports a fall 3 days prior he did strike his forehead on the floor  In the Ed he was found to be COVID positive, CT abd CXR showed opacities through out the lungs   He is admitted inpatient with COVID - 19 pneumonia                    ED Triage Vitals   Temperature Pulse Respirations Blood Pressure SpO2   02/07/21 1338 02/07/21 1338 02/07/21 1338 02/07/21 1338 02/07/21 1338   99 6 °F (37 6 °C) 86 20 135/73 94 %       Temp Source Heart Rate Source Patient Position - Orthostatic VS BP Location FiO2 (%)   02/07/21 1338 02/07/21 1338 02/07/21 2127 02/07/21 2127 --   Oral Monitor Lying Right arm         Pain Score           02/07/21 1750           No Pain                  Wt Readings from Last 1 Encounters:   06/08/20 81 kg (178 lb 9 2 oz)      Additional Vital Signs:   Date/Time   Temp   Pulse   Resp   BP   MAP (mmHg)   SpO2   Calculated FIO2 (%) - Nasal Cannula   Nasal Cannula O2 Flow Rate (L/min)   O2 Device   Patient Position - Orthostatic VS   02/08/21 0630   --   72   16   124/66   86   97 %   36   4 L/min   Nasal cannula   Lying   02/08/21 0513   98 9 °F (37 2 °C)   --   --   --   --   --   --   --   --   --   02/08/21 0345   102 7 °F (39 3 °C)Abnormal    76   16   131/60   87   93 %   36   4 L/min   Nasal cannula   Lying   02/07/21 2147   --   74   18   119/69   --   93 %   --   --   None (Room air)   Lying   02/07/21 2127   --   62   20   --   --   94 %   --   --   None (Room air)   Lying   02/07/21 2015   --   76   35Abnormal    --   --   93 %   --   --   --   --   02/07/21 1730   --   72   32Abnormal    118/69   88   97 %   --   --   --   --   02/07/21 1700   --   68   32Abnormal    114/65   84   97 %   --   --   --   --   02/07/21 1600   --   74   22   117/63   85   95 %   --   --   --   --   02/07/21 1500   --   74   20   116/67   86   93 %   --   --   None (Room air)   --               Pertinent Labs/Diagnostic Test Results:        Results from last 7 days   Lab Units 02/07/21  1424   SARS-COV-2   Positive*            Results from last 7 days   Lab Units 02/08/21  0513 02/07/21  1424   WBC Thousand/uL 3 60* 4 80   HEMOGLOBIN g/dL 12 2 14 0   HEMATOCRIT % 37 9 43 7   PLATELETS Thousands/uL 150 151   NEUTROS ABS Thousands/µL  --  3 93                Results from last 7 days   Lab Units 02/08/21  0513 02/07/21  1424   SODIUM mmol/L 140 140   POTASSIUM mmol/L 3 9 4 1   CHLORIDE mmol/L 108 105   CO2 mmol/L 27 29   ANION GAP mmol/L 5 6   BUN mg/dL 19 21   CREATININE mg/dL 1 11 1 42*   EGFR ml/min/1 73sq m 78 58   CALCIUM mg/dL 8 2* 8 8   CALCIUM, IONIZED mmol/L  --  1 06*           Results from last 7 days   Lab Units 02/07/21  1424   AST U/L 76*   ALT U/L 39   ALK PHOS U/L 37*   TOTAL PROTEIN g/dL 8 6*   ALBUMIN g/dL 3 3*   TOTAL BILIRUBIN mg/dL 0 62           Results from last 7 days   Lab Units 02/08/21  0705   POC GLUCOSE mg/dl 141*            Results from last 7 days   Lab Units 02/08/21  0513 02/07/21  1424   GLUCOSE RANDOM mg/dL 113 124               Results from last 7 days   Lab Units 02/07/21  1424   HEMOGLOBIN A1C % 6 9*   EAG mg/dl 151           Results from last 7 days   Lab Units 02/07/21  1424   CK TOTAL U/L 984*   CK MB INDEX % <1 0   CK MB ng/mL 2 4           Results from last 7 days   Lab Units 02/07/21  1424   TROPONIN I ng/mL <0 02           Results from last 7 days   Lab Units 02/07/21  1938   D-DIMER QUANTITATIVE ug/ml FEU 5 46*           Results from last 7 days   Lab Units 02/07/21  1424   TSH 3RD GENERATON uIU/mL 2 400            Results from last 7 days   Lab Units 02/08/21  0513 02/07/21  1938   PROCALCITONIN ng/ml 0 49* 0 51*           Results from last 7 days   Lab Units 02/07/21  1938   LACTIC ACID mmol/L 0 9              Results from last 7 days   Lab Units 02/07/21  1424   NT-PRO BNP pg/mL 9           Results from last 7 days   Lab Units 02/07/21  1424   FERRITIN ng/mL 966*           Results from last 7 days   Lab Units 02/07/21  1938   HEP B S AG   Non-reactive   HEP C AB   Non-reactive   HEP B C IGM   Non-reactive   HEP B C TOTAL AB   Non-reactive               Results from last 7 days   Lab Units 02/07/21  1424   CRP mg/L 97 2*               Results from last 7 days   Lab Units 02/07/21  1339   CLARITY UA   Clear   COLOR UA   Nathalia   SPEC GRAV UA   >=1 030   PH UA   5 5   GLUCOSE UA mg/dl Negative   KETONES UA mg/dl Negative   BLOOD UA   Large*   PROTEIN UA mg/dl >=300*   NITRITE UA   Negative   BILIRUBIN UA   Negative   UROBILINOGEN UA E U /dl 0 2   LEUKOCYTES UA   Negative   WBC UA /hpf 2-4   RBC UA /hpf 20-30*   BACTERIA UA /hpf Occasional   EPITHELIAL CELLS WET PREP /hpf None Seen           Results from last 7 days   Lab Units 02/07/21  1424   INFLUENZA A PCR   Negative   INFLUENZA B PCR   Negative   RSV PCR   Negative           Results from last 7 days   Lab Units 02/07/21  1938   BLOOD CULTURE   Received in Microbiology Lab  Culture in Progress  Received in Microbiology Lab  Culture in Progress       CT CAP- 2/7 - 1   Multifocal peripheral opacities through without lungs  In the setting of clinically suspected COVID-19, the above lung parenchymal findings on CT indicate intermediate to high confidence level for COVID-19 pneumonia  Parenchymal findings are moderate    in degree  2   No acute findings in the abdomen or pelvis  CT head - 2/7 -  No acute intracranial abnormality  CT Cervical Spine - 2/7 - 1   No cervical spine fracture or traumatic subluxation  2   Straightening of the cervical lordosis could suggest component of muscle spasm  3   Small to moderate multilevel spondylosis  No high-grade neural impingement or significant change since 2014       ECG -   Collection Time Result Time Vent R Atrial R FL Int  QRSD Int  QT Int  QTC Int   P Axis QRS Axis T Wave Ax    02/07/21 14:22:58 02/07/21 17:53:53 79 40   104 352 403   -84 50        Sinus rhythm   Left axis deviation   Abnormal ECG   When compared with ECG of 17-APR-2019 17:40,   Nonspecific T wave abnormality no longer evident in Anterior leads                          ED Treatment:              Medication Administration from 02/07/2021 1300 to 02/08/2021 0731        Date/Time Order Dose Route Action Comments       02/07/2021 1435 acetaminophen (TYLENOL) tablet 650 mg 650 mg Oral Given         02/07/2021 1427 sodium chloride 0 9 % bolus 1,000 mL 1,000 mL Intravenous New Bag         02/07/2021 1543 iohexol (OMNIPAQUE) 350 MG/ML injection (MULTI-DOSE) 100 mL 100 mL Intravenous Given         02/07/2021 2109 methocarbamol (ROBAXIN) tablet 500 mg 500 mg Oral Given         02/07/2021 2026 multi-electrolyte (PLASMALYTE-A/ISOLYTE-S PH 7 4) IV solution 100 mL/hr Intravenous New Bag         02/07/2021 2108 ascorbic acid (VITAMIN C) tablet 1,000 mg 1,000 mg Oral Given         02/07/2021 1940 remdesivir (Veklury) 200 mg in sodium chloride 0 9 % 250 mL IVPB 200 mg Intravenous New Bag         02/08/2021 0401 acetaminophen (TYLENOL) tablet 650 mg 650 mg Oral Given         02/08/2021 0511 dexamethasone (DECADRON) injection 6 mg 6 mg Intravenous Given         02/08/2021 0511 ceftriaxone (ROCEPHIN) 1 g/50 mL in dextrose IVPB 1,000 mg Intravenous New Bag            Medical History        Past Medical History:   Diagnosis Date    Prediabetes          Present on Admission:  **None**        Admitting Diagnosis: Blood in urine [R31 9]  Age/Sex: 78 y o  male            Admission Orders:  Scheduled Medications:  ascorbic acid, 1,000 mg, Oral, Q12H ILDA  atorvastatin, 40 mg, Oral, Daily With Dinner  cefTRIAXone, 1,000 mg, Intravenous, Q24H  cholecalciferol, 2,000 Units, Oral, Daily  dexamethasone, 6 mg, Intravenous, Q24H  doxycycline hyclate, 100 mg, Oral, Q12H ILDA  enoxaparin, 1 mg/kg, Subcutaneous, Q12H ILDA  famotidine, 20 mg, Oral, Daily  fluticasone, 2 spray, Nasal, Daily  insulin lispro, 1-6 Units, Subcutaneous, TID AC  loratadine, 10 mg, Oral, Daily  methocarbamol, 500 mg, Oral, HS  zinc sulfate, 220 mg, Oral, Daily    Followed by  Marah Riff ON 2/15/2021] multivitamin-minerals, 1 tablet, Oral, Daily  remdesivir, 100 mg, Intravenous, Q24H        Continuous IV Infusions:  PRN Meds:  acetaminophen, 650 mg, Oral, Q6H PRN  benzonatate, 200 mg, Oral, TID PRN              Network Utilization Review Department  ATTENTION: Please call with any questions or concerns to [phone] and carefully listen to the prompts so that you are directed to the right person  All voicemails are confidential   [facility] all requests for admission clinical reviews, approved or denied determinations and any other requests to dedicated fax number below belonging to the campus where the patient is receiving treatment   List of dedicated fax numbers for the Facilities:  [address] [place] [address] DENIALS (Administrative/Medical Necessity) [phone]   [address]  [facility] (Maternity/NICU/Pediatrics) [phone]   [address] [place] [address] 40 [id number] [facility] 5185 [facility] (  [address]) [address] [facility] 1650 [facility] [address] [facility] [address] Shasha Dahl [address] [place] [address]) [address] [place] [address] Drive [phone]

## 2021-02-10 NOTE — DISCHARGE SUMMARY
INTERNAL MEDICINE RESIDENCY DISCHARGE SUMMARY     Lashon Martinez   78 y o  male  MRN: [id number]  Room/Bed: /-[facility]    Encounter: [id number]    DISCHARGE INFORMATION     PCP at Discharge: PAULA Junior    Admitting Provider: Pk Barakat MD  Admission Date: 2/7/2021    Discharge Provider: No att  providers found  Discharge Date: 2/11/2020    Discharge Disposition: Home/Self Care  Discharge Condition: good  Discharge with Lines: no    Discharge Diet: diabetic diet  Activity Restrictions: none  Test Results Pending at Discharge: None    Discharge Diagnoses:  Principal Problem:    Pneumonia due to COVID-19 virus  Active Problems:    Tremor of both hands  Resolved Problems:    Hematuria    CASE (acute kidney injury) ([facility] [address] )    Allergic rhinitis    Chronic cough    Neck pain    DM (diabetes mellitus), type 2 (HCC)    Tremor of both hands  Assessment & Plan  Bilateral resting tremor, L>R  Patient reports that it started about two weeks ago when he began experiencing upper respiratory COVID symptoms      · No focal neurological deficits  · TSH was normal  · Spoke with neurology and they do not believe that it is related to [facility]  · Recommend outpatient follow-up with Neurology       * Pneumonia due to COVID-19 virus  Assessment & Plan  Pt with hx of chronic cough likely from post nasal drip, cough worsening this past week with increased fatigue, lightheadedness and decreased PO intake  Tested positive for COVID 19 on 02/07 with CT chest remarkable for multifocal peripheral opacities through without lungs consistent with COVID 19 pneumonia   Afebrile and hemodynamically stable on exam with SpO2 of 94% on RA, except for mild tachypnea    Risk factors: age >71, DM2    Plan:  - Plasma and Blood type & screen ordered 2/8  - Cardiac markers:Troponin <0 02, , BNP 9  - Inflammatory markers: CRP 97 2, D-Dimer 5 46, ferritin 966  - Vit D3 2000 IU daily, Vitamin C 1 g PO Q12H, and Zinc 220 mg PO daily x 7 days, followed by multivitamin  - Patient currently on 2 L nasal cannula -> continue to wean  - Continue famotidine and dexamethasone  - Received Remdesivir 200mg IV x 1 on 2/7, followed by 100mg IV daily x 4 days  - Spoke with Urology and restarted Lovenox 2/8  - OOB TID, ambulation & mobilization encouraged  - Encourage incentive spirometry use  - Home O2 eval ordered -> requires 3L O2  - Lower extremity VAS doppler ordered -> if negative will order CTA to rule out PE  - Plan for possible DC tomorrow if patient is stable     DM (diabetes mellitus), type 2 (HCC)-resolved as of 2/11/2021  Assessment & Plan  Lab Results   Component Value Date    HGBA1C 6 9 (H) 02/07/2021       Recent Labs     02/09/21  1608 02/09/21  2132 02/10/21  0712 02/10/21  1125   POCGLU 198* 197* 154* 253*       Blood Sugar Average: Last 72 hrs:  (P) 216 [id number]   Plan:  -Continue SSI and monitor BGL    Neck pain-resolved as of 2/11/2021  Assessment & Plan  Chronic neck pain described as stiffness/tightness x several months without any known injury or inciting event  Denies radiation of pain or weakness in his upper extremities  States the pain is primarily worse with turning his head  Recently started on meloxicam and robaxin for next pain, starting taking these medications 1 day PTA  Imaging:  CT cervical spine without any  fracture or traumatic subluxation  Straightening of the cervical lordosis could suggest component of muscle spasm  Small to moderate multilevel spondylosis  No high-grade neural impingement or significant change since 2014      Plan:  -Meloxicam held in the setting of CASE -> will restart at DC  -Continue robaxin  -Likely would benefit from neck stretches and possibly outpatient PT    Chronic cough-resolved as of 2/11/2021  Assessment & Plan  Likely in the setting of post-nasal drip    Plan:  -Continue flonase and claritin    Allergic rhinitis-resolved as of 2/11/2021  Assessment & Plan  Currently stable  Recently started on flonase and claritin by PCP    Plan:  -Continue flonase and claritin      CASE (acute kidney injury) (HCC)-resolved as of 2/11/2021  Assessment & Plan  CASE with Cr of 1 42 on admission (baseline Cr 1) likely in the setting of decreased PO intake x 2 days and possibly with component of obstruction as CT imaging showing nodular prostatic hypertrophy with median lobe nodules causing significant mass effect on the urinary bladder  Plan:  -Resolved  -S/p 1L bolus of NS in ED  -Completed IV fluids with isolyte 100cc/hr x 15 hrs   -Monitor BMP       Hematuria-resolved as of 2/11/2021  Assessment & Plan  Episode of painless hematuria noted in the morning 2/7, not associated with fever, chills, dysuria, suprapubic or back pain  UA with figueroa colored urine with protein and large blood, no signs of infection  Hematuria possibly related to BPH vs COVID 19 infection    Plan:  -Urine has been clear since admission  -Urology consulted -> will arrange outpatient follow up and future cystoscopy   -Hb stable  -Continue to monitor Hb and for signs of hematuria      Consulting Providers:      Diagnostic & Therapeutic Procedures Performed:  Ct Head Without Contrast    Result Date: 2/7/2021  Impression: No acute intracranial abnormality  Workstation performed: [id number]     Ct Spine Cervical Without Contrast    Addendum Date: 2/7/2021    ADDENDUM: Comparison made to cervical spine CT from 11/21/2014  Result Date: 2/7/2021  Impression: 1  No cervical spine fracture or traumatic subluxation  2   Straightening of the cervical lordosis could suggest component of muscle spasm  3   Small to moderate multilevel spondylosis  No high-grade neural impingement or significant change since 2014  Workstation performed: [id number]     Ct Chest Abdomen Pelvis W Contrast    Result Date: 2/7/2021  Impression: 1  Multifocal peripheral opacities through without lungs   In the setting of clinically suspected COVID-19, the above lung parenchymal findings on CT indicate intermediate to high confidence level for COVID-19 pneumonia  Parenchymal findings are moderate  in degree  2   No acute findings in the abdomen or pelvis  The study was marked in [place] [id number] for immediate notification  Workstation performed: [id number]       Code Status: Level 1 - Full Code  Advance Directive & Living Will: <no information>  Power of :    POLST:      Medications:  Current Discharge Medication List        Current Discharge Medication List        Current Discharge Medication List      CONTINUE these medications which have NOT CHANGED    Details   benzonatate (TESSALON) 200 MG capsule Take 1 capsule (200 mg total) by mouth 3 (three) times a day as needed for cough  Qty: 30 capsule, Refills: 1    Associated Diagnoses: Chronic cough      Blood Pressure Monitoring (BLOOD PRESSURE MONITOR/M CUFF) MISC by Does not apply route daily  Qty: 1 each, Refills: 0    Associated Diagnoses: Essential hypertension      fluticasone (FLONASE) 50 mcg/act nasal spray 2 sprays into each nostril daily  Qty: 1 Bottle, Refills: 0    Associated Diagnoses: Allergic rhinitis, unspecified seasonality, unspecified trigger      ketoconazole (NIZORAL) 2 % cream Apply topically daily  Qty: 15 g, Refills: 2    Associated Diagnoses: Tinea pedis of both feet      loratadine (CLARITIN) 10 mg tablet Take 1 tablet (10 mg total) by mouth daily  Qty: 30 tablet, Refills: 0    Associated Diagnoses:  Allergic rhinitis, unspecified seasonality, unspecified trigger      meloxicam (MOBIC) 15 mg tablet Take 1 tablet (15 mg total) by mouth daily In the morning with food for neck pain  Qty: 30 tablet, Refills: 0    Associated Diagnoses: Acute neck pain      methocarbamol (ROBAXIN) 500 mg tablet Take 1 tablet (500 mg total) by mouth daily at bedtime For neck pain  Qty: 30 tablet, Refills: 0    Associated Diagnoses: Acute neck pain           Allergies:  No Known Allergies  FOLLOW-UP     PCP Outpatient Follow-up:  none required    Consulting Providers Follow-up:  yes      Specialty:  Urology  Office phone number:  [phone]  Follow up within next:  6 weeks     Active Issues Requiring Follow-up:   none    Physical Exam  Constitutional:       Appearance: He is well-developed  HENT:      Head: Normocephalic and atraumatic  Nose: Nose normal    Eyes:      General:         Right eye: No discharge  Left eye: No discharge  Conjunctiva/sclera: Conjunctivae normal       Pupils: Pupils are equal, round, and reactive to light  Neck:      Thyroid: No thyromegaly  Cardiovascular:      Rate and Rhythm: Normal rate and regular rhythm  Heart sounds: Normal heart sounds  No murmur  No friction rub  No gallop  Pulmonary:      Effort: Pulmonary effort is normal  No respiratory distress  Breath sounds: Normal breath sounds  No stridor  No wheezing or rales  Comments: On 2 L N/C  Mild cough  Chest:      Chest wall: No tenderness  Abdominal:      General: Bowel sounds are normal  There is no distension  Palpations: Abdomen is soft  There is no mass  Tenderness: There is no abdominal tenderness  There is no guarding or rebound  Musculoskeletal:         General: No swelling or deformity  Right lower leg: No edema  Left lower leg: No edema  Lymphadenopathy:      Cervical: No cervical adenopathy  Skin:     General: Skin is warm and dry  Neurological:      Mental Status: He is alert and oriented to person, place, and time  Psychiatric:         Mood and Affect: Mood normal          Behavior: Behavior normal          Thought Content:  Thought content normal          Judgment: Judgment normal          DETAILS OF HOSPITAL COURSE     HPI as per Dr Kalli Ruiz: "Delmer Hendrickson is a 79 yo M with hx of DM2 who presented to [facility] on 02/07 with complaints of worsening cough x 1 week associated with generalized fatigue, dizziness and tremulousness  States he has had a cough for several months now, but it increased in frequency this week  Pt is having productive cough with white sputum  No fever, chills, CP, or SOB  No smoking hx  Pt reports good PO intake of both fluids and solids, however, son at bedside disagrees and states he has not been eating well recently  Pt also endorses episode of hematuria early this morning with robin blood with clots noted during urination  Denies any pain associated with this, including any suprapubic or flank pain  No prior hx of hematuria  Denies recent trauma, however, he did trip and fall approximately 3 days ago striking his L forehead on the floor  No LOC  Not on blood thinner  He is complaining of neck pain which has been ongoing prior to this fall event  He was started on meloxicam and robaxin for his neck pain 2 days ago by PCP which has provided some relief  Patient was accompanied to the ED by his son  Patient's son had COVID-23 earlier this year, however, they had no contact with each other at that time  Of note, patient did travel to [place] via bus to visit with his sisters earlier this week      In ED, afebrile and hemodynamically stable on RA, except for mild tachypnea  Labwork notable for CASE with Cr of 1 42  UA with large blood and protein, however, Hg stable at 14  COVID 19 positive  CT chest/abdomen/pelvis showing multifocal peripheral opacities through without lungs consistent with COVID 19 pneumonia as well as nodular prostatic hypertrophy causing significant mass effect on the urinary bladder  CT head was negative trauma or any acute pathology  Pt admitted under mild COVID 19 pathway for CASE and hematuria "    In the emergency department the patient was started on fluids and was given plasma  He was started on the moderate COVID pathway including vitamin D3, vitamin-C, zinc, famotidine, dexamethasone and remdesivir    The patient's oxygen saturation was in the high 80s when on room air in he required around 2-3 L nasal cannula throughout his hospitalization  He was seen and examined by Urology for the hematuria and they did not believe that any intervention was required this time and that they will arrange outpatient follow-up and cystoscopy in the office  He did not experience any hematuria throughout the course of his hospitalization  The patient's cough and shortness of breath progressively improved throughout the course of his hospitalization, however prior to discharge a home O2 eval was ordered and the patient requires 3 L nasal cannula at home  Patient was also found to have a bilateral resting hand tremor worse on the left which began around the time when his COVID symptoms began about 1 week prior to hospitalization  His tremor improved throughout the course of his hospitalization as well  On 02/11/2021 the patient was deemed medically stable for discharge  Discharge Statement:   I spent 1 hour minutes discharging the patient  This time was spent on the day of discharge  I had direct contact with the patient on the day of discharge   Additional documentation is required if more than 30 minutes were spent on discharge     ==  Velvet Jeffers MD   IM Resident, PGY-1  [facility]

## 2021-02-10 NOTE — PLAN OF CARE
Problem: Potential for Falls  Goal: Patient will remain free of falls  Description: INTERVENTIONS:  - Assess patient frequently for physical needs  -  Identify cognitive and physical deficits and behaviors that affect risk of falls    -  [place] fall precautions as indicated by assessment   - Educate patient/family on patient safety including physical limitations  - Instruct patient to call for assistance with activity based on assessment  - Modify environment to reduce risk of injury  - Consider OT/PT consult to assist with strengthening/mobility  Outcome: Progressing

## 2021-02-10 NOTE — CASE MANAGEMENT
Pt qualifies for home O2  Cm sent referral to [facility] per request of pt via [facility]  Pt refusing VNA, he feels comfortable with teaching from nursing

## 2021-02-10 NOTE — ASSESSMENT & PLAN NOTE
Bilateral resting tremor, L>R  Patient reports that it started about two weeks ago when he began experiencing upper respiratory COVID symptoms      · No focal neurological deficits  · TSH was normal  · Spoke with neurology and they do not believe that it is related to [facility]  · Recommend outpatient follow-up with Neurology

## 2021-02-11 VITALS
RESPIRATION RATE: 18 BRPM | SYSTOLIC BLOOD PRESSURE: 150 MMHG | BODY MASS INDEX: 27.74 KG/M2 | WEIGHT: 172.62 LBS | HEART RATE: 49 BPM | TEMPERATURE: 97.4 F | HEIGHT: 66 IN | OXYGEN SATURATION: 94 % | DIASTOLIC BLOOD PRESSURE: 69 MMHG

## 2021-02-11 PROBLEM — M54.2 NECK PAIN: Status: RESOLVED | Noted: 2021-02-07 | Resolved: 2021-02-11

## 2021-02-11 PROBLEM — N17.9 AKI (ACUTE KIDNEY INJURY) (HCC): Status: RESOLVED | Noted: 2021-02-07 | Resolved: 2021-02-11

## 2021-02-11 PROBLEM — R05.3 CHRONIC COUGH: Status: RESOLVED | Noted: 2021-02-07 | Resolved: 2021-02-11

## 2021-02-11 PROBLEM — J30.9 ALLERGIC RHINITIS: Status: RESOLVED | Noted: 2021-02-07 | Resolved: 2021-02-11

## 2021-02-11 PROBLEM — E11.9 DM (DIABETES MELLITUS), TYPE 2 (HCC): Status: RESOLVED | Noted: 2021-02-07 | Resolved: 2021-02-11

## 2021-02-11 PROBLEM — R31.9 HEMATURIA: Status: RESOLVED | Noted: 2021-02-07 | Resolved: 2021-02-11

## 2021-02-11 LAB
GLUCOSE SERPL-MCNC: 145 MG/DL (ref 65–140)
PROCALCITONIN SERPL-MCNC: 0.17 NG/ML

## 2021-02-11 PROCEDURE — 84145 PROCALCITONIN (PCT): CPT | Performed by: INTERNAL MEDICINE

## 2021-02-11 PROCEDURE — 99239 HOSP IP/OBS DSCHRG MGMT >30: CPT | Performed by: INTERNAL MEDICINE

## 2021-02-11 PROCEDURE — 82948 REAGENT STRIP/BLOOD GLUCOSE: CPT

## 2021-02-11 RX ORDER — ATORVASTATIN CALCIUM 40 MG/1
40 TABLET, FILM COATED ORAL
Qty: 14 TABLET | Refills: 0 | Status: SHIPPED | OUTPATIENT
Start: 2021-02-11 | End: 2021-03-11 | Stop reason: ALTCHOICE

## 2021-02-11 RX ORDER — MULTIVITAMIN/IRON/FOLIC ACID 18MG-0.4MG
1 TABLET ORAL DAILY
Qty: 30 TABLET | Refills: 0 | Status: SHIPPED | OUTPATIENT
Start: 2021-02-15 | End: 2022-01-27 | Stop reason: ALTCHOICE

## 2021-02-11 RX ORDER — ZINC SULFATE 50(220)MG
220 CAPSULE ORAL DAILY
Qty: 7 CAPSULE | Refills: 0 | Status: SHIPPED | OUTPATIENT
Start: 2021-02-12 | End: 2022-01-27

## 2021-02-11 RX ADMIN — DEXAMETHASONE SODIUM PHOSPHATE 6 MG: 4 INJECTION, SOLUTION INTRA-ARTICULAR; INTRALESIONAL; INTRAMUSCULAR; INTRAVENOUS; SOFT TISSUE at 04:35

## 2021-02-11 RX ADMIN — CEFTRIAXONE SODIUM 1000 MG: 10 INJECTION, POWDER, FOR SOLUTION INTRAVENOUS at 04:34

## 2021-02-11 RX ADMIN — LORATADINE 10 MG: 10 TABLET ORAL at 08:47

## 2021-02-11 RX ADMIN — ZINC SULFATE 220 MG (50 MG) CAPSULE 220 MG: CAPSULE at 08:47

## 2021-02-11 RX ADMIN — ENOXAPARIN SODIUM 80 MG: 80 INJECTION SUBCUTANEOUS at 08:47

## 2021-02-11 RX ADMIN — Medication 2000 UNITS: at 08:47

## 2021-02-11 RX ADMIN — DOXYCYCLINE 100 MG: 100 CAPSULE ORAL at 06:18

## 2021-02-11 RX ADMIN — FLUTICASONE PROPIONATE 2 SPRAY: 50 SPRAY, METERED NASAL at 08:44

## 2021-02-11 RX ADMIN — OXYCODONE HYDROCHLORIDE AND ACETAMINOPHEN 1000 MG: 500 TABLET ORAL at 08:47

## 2021-02-11 RX ADMIN — FAMOTIDINE 20 MG: 20 TABLET ORAL at 08:47

## 2021-02-11 NOTE — DISCHARGE INSTRUCTIONS
Please follow up with Urology in 6 weeks  Please obtain outpatient labs (BMP) tomorrow 2/12    Please follow up with your Primary Care Provider to review hospital course  COVID-19 (Coronavirus Disease 2019)   WHAT YOU NEED TO KNOW:   COVID-19 is the disease caused by the novel (new) coronavirus first discovered in December 2019  Coronaviruses generally cause upper respiratory (nose, throat, and lung) infections, such as a cold  The new virus can also cause serious lower respiratory conditions, such as pneumonia or acute respiratory distress syndrome (ARDS)  Anyone can develop serious problems from the new virus, but your risk is higher if you are 65 or older  A weak immune system, diabetes, or a heart or lung condition can also increase your risk  DISCHARGE INSTRUCTIONS:   If you think you or someone you know may be infected:  Do the following to protect others:  · If emergency care is needed,  tell the  about the possible infection, or call ahead and tell the emergency department  · Call a healthcare provider  for instructions if symptoms are mild  Anyone who may be infected should not  arrive without calling first  The provider will need to protect staff members and other patients  · The person who may be infected needs to wear a face covering  while getting medical care  This will help lower the risk of infecting others  Coverings are not used for anyone who is younger than 2 years, has breathing problems, or cannot remove it  The provider can give you instructions for anyone who cannot wear a covering  Call your local emergency number (911 in the [address] [place] [address],[address]) or go to the emergency department if:   · You have trouble breathing or shortness of breath at rest     · You have chest pain or pressure that lasts longer than 5 minutes  · You become confused or hard to wake  · Your lips or face are blue  · You have a fever of 104°F (40°C) or higher      Call your doctor if:   · You do not  have symptoms of COVID-19 but had close physical contact within 14 days with someone who tested positive  · You have questions or concerns about your condition or care  Medicines: You may need any of the following for mild symptoms:  · Decongestants  help reduce nasal congestion and help you breathe more easily  If you take decongestant pills, they may make you feel restless or cause problems with your sleep  Do not use decongestant sprays for more than a few days  · Cough suppressants  help reduce coughing  Ask your healthcare provider which type of cough medicine is best for you  · Acetaminophen  decreases pain and fever  It is available without a doctor's order  Ask how much to take and how often to take it  Follow directions  Read the labels of all other medicines you are using to see if they also contain acetaminophen, or ask your doctor or pharmacist  Acetaminophen can cause liver damage if not taken correctly  Do not use more than 4 grams (4,000 milligrams) total of acetaminophen in one day  · NSAIDs , such as ibuprofen, help decrease swelling, pain, and fever  NSAIDs can cause stomach bleeding or kidney problems in certain people  If you take blood thinner medicine, always ask your healthcare provider if NSAIDs are safe for you  Always read the medicine label and follow directions  · Take your medicine as directed  Contact your healthcare provider if you think your medicine is not helping or if you have side effects  Tell him or her if you are allergic to any medicine  Keep a list of the medicines, vitamins, and herbs you take  Include the amounts, and when and why you take them  Bring the list or the pill bottles to follow-up visits  Carry your medicine list with you in case of an emergency  How the 2019 coronavirus spreads: The virus spreads quickly and easily  You can become infected if you are in contact with a large amount of the virus, even for a short time   You can also become infected by being around a small amount of virus for a long time  The following are ways the virus is thought to spread, but more information may be coming:  · Droplets are the most common way all coronaviruses spread  The virus can travel in droplets that form when a person talks, coughs, or sneezes  Anyone who breathes in the droplets or gets them in his or her eyes can become infected with the virus  Close personal contact with an infected person is thought to be the main way the virus spreads  Close personal contact means you are within 6 feet (2 meters) of the person  · Person-to-person contact can spread the virus  For example, a person with the virus on his or her hands can spread it by shaking hands with someone  At this time, it does not appear that the virus can be passed to a baby during pregnancy or delivery  The baby can be infected after he or she is born through person-to-person contact  The virus also does not appear to spread in breast milk  If you are pregnant or breastfeeding, talk to your healthcare provider or obstetrician about any concerns you have  · The virus can stay on objects and surfaces  A person can get the virus on his or her hands by touching the object or surface  Infection happens if the person then touches his or her eyes or mouth with unwashed hands  It is not yet known how long the virus can stay on an object or surface  That is why it is important to clean all surfaces that are used regularly  · An infected animal may be able to infect a person who touches it  This may happen at live markets or on a farm  How everyone can lower the risk for COVID-19:  The best way to prevent infection is to avoid anyone who is infected, but this can be hard to do  An infected person can spread the virus before signs or symptoms begin, or even if signs or symptoms never develop  The following can help lower the risk for infection:      · Wash your hands often throughout the day  Use soap and water  Rub your soapy hands together, lacing your fingers  Wash the front and back of each hand, and in between your fingers  Use the fingers of one hand to scrub under the fingernails of the other hand  Wash for at least 20 seconds  Rinse with warm, running water for several seconds  Then dry your hands with a clean towel or paper towel  Use hand  that contains alcohol if soap and water are not available  Do not touch your eyes, nose, or mouth without washing your hands first  Teach children how to wash their hands and use hand   · Cover a sneeze or cough  This prevents droplets from traveling from you to others  Turn your face away and cover your mouth and nose with a tissue  Throw the tissue away  Use the bend of your arm if a tissue is not available  Then wash your hands well with soap and water or use hand   Turn and cover your face if you are around someone who is sneezing or coughing  Teach children how to cover a cough or sneeze  · Follow worldwide, national, and local social distancing guidelines  Social distancing means people avoid close physical contact so the virus cannot spread from one person to another  Keep at least 6 feet (2 meters) between you and others  Also keep this distance from anyone who comes to your home, such as someone making a delivery  · Make a habit of not touching your face  It is not known how long the virus can stay on objects and surfaces  If you get the virus on your hands, you can transfer it to your eyes, nose, or mouth and become infected  You can also transfer it to objects, surfaces, or people  Be aware of what you touch when you go out  Examples include handrails and elevator buttons  Try not to touch anything with bare hands unless it is necessary  Wash your hands before you leave your home and when you return  · Clean and disinfect high-touch surfaces and objects often  Use a disinfecting solution or wipes   You can make a solution by diluting 4 teaspoons of bleach in 1 quart (4 cups) of water  Clean and disinfect even if you think no one living in or coming to your home is infected with the virus  You can wipe items with a disinfecting cloth before you bring them into your home  Wash your hands after you handle anything you bring into your home  · Make your immune system as healthy as possible  A weakened immune system makes you more vulnerable to the new coronavirus  No COVID-19 vaccine is available yet  Vaccines such as the flu and pneumonia vaccines can help your immune system  Your healthcare provider can tell you which vaccines to get, and when to get them  Keep your immune system as strong as possible  Do not smoke  Eat healthy foods, exercise regularly, and try to manage stress  Go to bed and wake up at the same times each day  Social distancing guidelines:  National and local social distancing rules vary  Rules may change over time as restrictions are lifted  Restrictions may return if an outbreak happens where you live  It is important to know and follow all current social distancing rules in your area  The following are general guidelines:  · Limit trips out of your home  You may be able to have food, medicines, and other supplies delivered  If possible, have delivered items left at your door or other area  Try not to have someone hand you an item  You will be so close to the person that the virus can spread between you  · Do not have close physical contact with anyone who does not live in your home  Do not shake hands with, hug, or kiss a person as a greeting  Stand or walk as far from others as possible  If you must use public transportation (such as a bus or subway), try to sit or stand away from others  You can stay safely connected with others through phone calls, e-mail messages, social media websites, and video chats   Check in on anyone who may be having a hard time socially distancing, or who lives alone  Ask administrators at nursing homes or long-term care facilities how you can safely communicate with someone living there  · Wear a cloth face covering around others who do not live in your home  Face coverings help prevent the virus from spreading to others in droplets  You can use a clear face covering if someone needs to read your lips  This is a cloth covering that has plastic over the mouth area so your lips can be seen  Do not use coverings that have breathing valves or vents  The virus can travel out of the valve or vent and be spread to others  Do not take your covering off to talk, cough, or sneeze  Do not use coverings on children younger than 2 years or on anyone who has breathing problems or cannot remove it  · Only allow medical or other necessary professionals into your home  Wear your face covering, and remind professionals to wear a face covering  Remind them to wash their hands when they arrive and before they leave  Do not  let anyone who does not live in your home in, even if the person is not sick  A person can pass the virus to others before symptoms of COVID-19 begin  Some people never even develop symptoms  Children commonly have mild symptoms or no symptoms  It may be hard to tell a child not to hug or kiss you  Explain that this is how he or she can help you stay healthy  · Do not go to someone else's home unless it is necessary  Do not go over to visit, even if the person is lonely  Only go if you need to help him or her  Make sure you both wear face coverings while you are there  · Avoid large gatherings and crowds  Gatherings or crowds of 10 or more individuals can cause the virus to spread  Examples of gatherings include parties, sporting events, [employer] services, and conferences  Crowds may form at beaches, [place], and tourist attractions  Protect yourself by staying away from large gatherings and crowds      · Ask your healthcare provider for other ways to have appointments  You may be able to have appointments without having to go into the provider's office  Some providers offer phone, video, or other types of appointments  You may also be able to get prescriptions for a few months of your medicines at a time  · Stay safe if you must go out to work  You may have a job that can only be done outside your home  Keep physical distance between you and other workers as much as possible  Follow your employer's rules so everyone stays safe  If you have COVID-19 and are recovering at home:  Healthcare providers will give you specific instructions to follow  The following are general guidelines to remind you how to keep others safe until you are well:  · Wash your hands often  Use soap and water as much as possible  You can use hand  that contains alcohol if soap and water are not available  Do not share towels with anyone  If you use paper towels, throw them away in a lined trash can kept in your room or area  Use a covered trash can, if possible  · Do not go out of your home unless it is necessary  You may have to go to your healthcare provider's office for check-ups or to get prescription refills  Do not arrive at the provider's office without an appointment  Providers have to make their offices safe for staff and other patients  · Do not have close physical contact with anyone unless it is necessary  Only have close physical contact with a person giving direct care, or a baby or child you must care for  Family members and friends should not visit you  If possible, stay in a separate area or room of your home if you live with others  No one should go into the area or room except to give you care  You can visit with others by phone, video chat, e-mail, or similar systems  It is important to stay connected with others in your life while you recover  · Wear a face covering while others are near you    This can help prevent droplets from spreading the virus when you talk, sneeze, or cough  Put the covering on before anyone comes into your room or area  Remind the person to cover his or her nose and mouth before going in to provide care for you  · Do not share items  Do not share dishes, towels, or other items with anyone  Items need to be washed after you use them  · Protect your baby  Wash your hands with soap and water often throughout the day  Wear a clean face covering while you breastfeed, or while you express or pump breast milk  If possible, ask someone who is well to care for your baby  You can put breast milk in bottles for the person to use, if needed  Talk to your healthcare provider if you have any questions or concerns about caring for or bonding with your baby  He or she will tell you when to bring your baby in for check-ups and vaccines  He or she will also tell you what to do if you think your baby was infected with the new virus  · Do not handle live animals  Until more is known, it is best not to touch, play with, or handle live animals  Some animals, including pets, have been infected with the new coronavirus  Do not handle or care for animals until you are well  Care includes feeding, petting, and cuddling your pet  Do not let your pet lick you or share your food  Ask someone who is not infected to take care of your pet, if possible  If you must care for a pet, wear a face covering  Wash your hands before and after you give care  · Follow directions from your healthcare provider for being around others after you recover  You will need to wait at least 10 days after symptoms first appeared  Then you will need to have no fever for 24 hours without fever medicine, and no other symptoms  A loss of taste or smell may continue for several months  It is considered okay to be around others if this is your only symptom  It is not known for sure if or for how long a recovered person can pass the virus to others   Your provider may give you instructions, such as continuing social distancing or wearing a face covering around others  How to take care of someone who has COVID-19:  If the person lives in another home, arrange for a time to give care  Remember to bring a few pairs of disposable gloves and a cloth face covering  The following are general guidelines to help you safely care for anyone who has COVID-19:  · Wash your hands often  Wash before and after you go into the person's home, area, or room  Throw paper towels away in a lined trash can that has a lid, if possible  · Do not allow others to go near the person  No one should come into the person's home unless it is necessary  If possible, the person should be in a separate area or room if he or she lives with others  Keep the room's door shut unless you need to go in or out  Have others call, video chat, or e-mail the person if he or she is feeling well enough  The person may feel lonely if he or she is kept separate for a long period of time  Safe communication can help him or her stay connected to family and friends  · Make sure the person's room has good air flow  You may be able to open the window if the weather allows  An air conditioner can also be turned on to help air move  · Contact the person before you go in to give care  Make sure the person is wearing a face covering  Remind him or her to wash his or her hands with soap and water  He or she can use hand  that contains alcohol if soap and water are not available  Put on a face covering before you go in to give care  · Wear gloves while you give care and clean  Clean items the person uses often  Clean countertops, cooking surfaces, and the fronts and insides of the microwave and refrigerator  Clean the shower, toilet, the area around the toilet, the sink, the area around the sink, and faucets  Gather used laundry or bedding  Wash and dry items on the warmest settings the fabric allows   Wash dishes and silverware in hot, soapy water or in a   · Anything you throw away needs to go into a lined trash can  When you need to empty the trash, close the open end of the lining and tie it closed  This helps prevent items the virus is on from spilling out of the trash  Remove your gloves and throw them away  Wash your hands  Follow up with your doctor as directed:  Write down your questions so you remember to ask them during your visits  For more information:   · Centers for Disease Control and Prevention  [address] , [address]  Phone: 8- 115 - 565-4552  Web Address: Detective[employer] com br    © Copyright [address] Information is for End User's use only and may not be sold, redistributed or otherwise used for commercial purposes  All illustrations and images included in CareNotes® are the copyrighted property of A D A M , Inc  or [address] [place], [place] [address]   The above information is an  only  It is not intended as medical advice for individual conditions or treatments  Talk to your doctor, nurse or pharmacist before following any medical regimen to see if it is safe and effective for you

## 2021-02-11 NOTE — PROGRESS NOTES
Pastoral Care Progress Note    2021  Patient: Asaf Goodson : 1950  Admission Date & Time: 2021 1325  MRN: [id number] CSN: [id number]         visited with patient via telephone  Patient states that he has no need for pastoral care at this time               Chaplaincy Interventions Utilized:   Empowerment: Encouraged focus on present and Provided chaplaincy education    Exploration: Explored hope, Explored emotional needs & resources and Explored spiritual needs & resources    Relationship Building: Cultivated a relationship of care and support    Chaplaincy Outcomes Achieved:  Expressed gratitude    Spiritual Coping Strategies Utilized:   Connectedness       21 1100   Clinical Encounter Type   Visited With Patient   Routine Visit Introduction

## 2021-02-12 ENCOUNTER — PATIENT OUTREACH (OUTPATIENT)
Dept: INTERNAL MEDICINE CLINIC | Facility: CLINIC | Age: 71
End: 2021-02-12

## 2021-02-12 ENCOUNTER — TRANSITIONAL CARE MANAGEMENT (OUTPATIENT)
Dept: INTERNAL MEDICINE CLINIC | Facility: CLINIC | Age: 71
End: 2021-02-12

## 2021-02-12 LAB
BACTERIA BLD CULT: NORMAL
BACTERIA BLD CULT: NORMAL

## 2021-02-12 NOTE — PROGRESS NOTES
Outpatient Care Management Note:    Patient was inpatient at [facility] from 2/7-2/11/21 for pneumonia due to COVID-19 virus  Patient was discharged home on oxygen 3 liters from [facility]  Patient declined VNA services  Patient did have an acute kidney injury on admission that resolved  Creatinine was 1 42 and at discharge was 0 96  Patient was identified for the SOD CKD/CASE   BMP was ordered at discharge  Patient is scheduled to follow up virtually with PCP and will need follow up with Neurology for tremors and Urology for hematuria  I called patient on mobile number and home number  No answer, messages left on both that this is the nurse Mily Stevenson calling with PCP office to follow up with him regarding recent hospital stay and to review information and follow up needed and to ensure he has all his medication at this time  I requested a call back at [phone] and that I am available M-F 8-4:30 pm      Patient scheduled on 2/17 @ 12:40 pm with PCP

## 2021-02-15 ENCOUNTER — PATIENT OUTREACH (OUTPATIENT)
Dept: INTERNAL MEDICINE CLINIC | Facility: CLINIC | Age: 71
End: 2021-02-15

## 2021-02-15 NOTE — PROGRESS NOTES
Outpatient Care Management Note:    [facility] for pneumonia due to COVID-19 virus  Patient was discharged home on oxygen 3 liters from [facility]  Patient declined VNA services  Patient did have an acute kidney injury on admission that resolved  Creatinine was 1 42 and at discharge was 0 96  Patient was identified for the SOD CKD/CASE   BMP was ordered at discharge  Patient is scheduled to follow up virtually with PCP and will need follow up with Neurology for tremors and Urology for hematuria       I called patient on mobile number  No answer, messages left that this is the nurse Stacy Davies calling with PCP office to follow up with him regarding recent hospital stay and to review information and follow up needed and to ensure he has all his medication at this time  I requested a call back at [phone] and that I am available M-F 8-4:30 pm  I also left PCP office number [phone]  I did make him aware appointment for  2/17 @ 12:40 pm with PCP with be a virtual visit and not an in person visit  I called home number and female picked up the phone  I explained who I was and she reports she will call patient and have him call me

## 2021-02-15 NOTE — PROGRESS NOTES
I received call from patient  I explained my role to him  He reports he is feeling well  He denies any symptoms of complaints at this time  He reports he does have his oxygen at home  He reports he is not wearing it all the time and not wearing it when he sleeps  I did review why he needed oxygen and did encourage him to wear it at all times  Patient reports when he does use his oxygen he is using 3 liters  Patient does not have way of checking pulse ox or blood pressure at home  I did review with him medication that was sent to pharmacy  He reports he has not been to the pharmacy to get his medication yet  He will have his son go today  I did review with him multi vitamin was ordered for 30 day, zinc sulfate daily for 1 week, Vit C, and atorvastatin and explained why the atorvastatin was prescribed  I did make patient aware appointment with PCP on Wednesday is a virtual visit and he has a smart phone and can do video call  I did review with patient the need to follow up with urology for hematuria  He has phone number on AVS to call  I encouraged him to call if he does not receive a call by Wednesday  I did review with patient about following up with Neurology for tremors  Patient reports his tremors have improved significantly and are "almost gone " Patient wants to speak further with PCP about follow up with Neurology as he does not feel he needs to follow up at this time  Patient reports he was made aware of CASE while inpatient  I did review with him the need for follow up bloodwork (BMP)  I did make him aware of mobile lab but prefers to go to lab at the hospital and made aware to do bloodwork once quarantine is completed  Patient does not have any further questions, concerns, or other needs at this time  Pt has PCP office # if needed  Patient did not feel he needed further outreach at this time

## 2021-02-16 NOTE — TELEPHONE ENCOUNTER
Called Patient and LM that we would like to scheduled an appointment for his visit in hospital - gross hematuria   He is tentatively scheduled with Dr Arianna Cody on 3/1 @ 9:00 in our [facility] office

## 2021-02-17 ENCOUNTER — TELEMEDICINE (OUTPATIENT)
Dept: INTERNAL MEDICINE CLINIC | Facility: CLINIC | Age: 71
End: 2021-02-17

## 2021-02-17 DIAGNOSIS — R31.9 HEMATURIA OF UNKNOWN CAUSE: ICD-10-CM

## 2021-02-17 DIAGNOSIS — N40.0 ENLARGED PROSTATE: ICD-10-CM

## 2021-02-17 DIAGNOSIS — Z09 HOSPITAL DISCHARGE FOLLOW-UP: Primary | ICD-10-CM

## 2021-02-17 DIAGNOSIS — I10 ESSENTIAL HYPERTENSION: Chronic | ICD-10-CM

## 2021-02-17 DIAGNOSIS — J12.82 PNEUMONIA DUE TO COVID-19 VIRUS: ICD-10-CM

## 2021-02-17 DIAGNOSIS — R25.1 TREMOR: ICD-10-CM

## 2021-02-17 DIAGNOSIS — E11.9 TYPE 2 DIABETES, DIET CONTROLLED (HCC): Chronic | ICD-10-CM

## 2021-02-17 DIAGNOSIS — U07.1 PNEUMONIA DUE TO COVID-19 VIRUS: ICD-10-CM

## 2021-02-17 PROCEDURE — 1111F DSCHRG MED/CURRENT MED MERGE: CPT | Performed by: PHYSICIAN ASSISTANT

## 2021-02-17 PROCEDURE — 99496 TRANSJ CARE MGMT HIGH F2F 7D: CPT | Performed by: PHYSICIAN ASSISTANT

## 2021-02-17 NOTE — PROGRESS NOTES
Assessment/Plan: On your visit today we discussed your recent hospitalization for bilateral COVID pneumonia  You confirm you received and will be completing the vitamin-C, atorvastatin and zinc tomorrow  You also confirm that you are feeling much improved  Occasional shortness of breath if you go up and down the stairs a lot  As we discussed you are not utilizing the oxygen on a regular basis but also have not been checking your oxygen level  You state you do not have any shortness of breath unless you go up and down the stairs multiple times  You report sleeping very well the past 3 nights without waking  You report you are eating regular meals and drinking plenty of liquids  You also confirm you have not had another episode of blood in your urine and  also confirm you are scheduled with the urologist on March 1st     As per our discussion we will get you scheduled for an additional virtual follow-up to monitor your symptoms  At this time your quarantine is scheduled and on February 21st   After that time we will then get you scheduled for a visit in the office  Problem List Items Addressed This Visit        Endocrine    Type 2 diabetes, diet controlled ([address] ) (Chronic)       Respiratory    Pneumonia due to COVID-19 virus       Cardiovascular and Mediastinum    Essential hypertension (Chronic)      Other Visit Diagnoses     Hospital discharge follow-up    -  Primary    Hematuria of unknown cause        Enlarged prostate        Tremor                 Reason for visit is hospital follow up    Encounter provider Aminta Cao PA-C       Provider located at [facility]  [address] [id number] [phone]      Recent Visits  No visits were found meeting these conditions     Showing recent visits within past 7 days and meeting all other requirements     Today's Visits  Date Type Provider Dept   02/17/21 Telemedicine Aminta Cao PA-C [address] [facility] today's visits and meeting all other requirements     Future Appointments  No visits were found meeting these conditions  Showing future appointments within next 150 days and meeting all other requirements        After connecting through [employer], the patient was identified by name and date of birth  Buddy Yan was informed that this is a telemedicine visit and that the visit is being conducted through [facility] [place] [place] and patient was informed that this is a secure, HIPAA-compliant platform  He agrees to proceed     My office door was closed  No one else was in the room  He acknowledged consent and understanding of privacy and security of the video platform  The patient has agreed to participate and understands they can discontinue the visit at any time  Patient is aware this is a billable service  Subjective:     Patient ID: Buddy Yan is a 78 y o  male  Patient contacted today for virtual follow-up for recent hospitalization  Patient was hospitalized from February 7 through February 11th  Patient had initially presented to the emergency room with complaint of a worsening cough for the past week associated with feeling tired, dizziness and felt he was shaky  Patient states he had had a mild cough for months before this but over the past week it had gotten significantly worse  Patient was denying fevers chills  Patient states he was eating well however son who accompanied him reported he was not eating well  Patient also then reported earlier that morning he had an episode of blood in his urine with clots noted  Patient denied any pain and denied any prior history of this  In the emergency room lab work noted acute kidney injury but hemoglobin was stable  CT scan completed of chest abdomen and pelvis showing multiple up a cities throughout his lungs consistent with COVID-19 pneumonia    It also demonstrated an enlarged nodular prostatic hypertrophy putting pressure on his bladder  CT of his head was completed for the trembling and also had reported he had a fall 3 days previously striking his head on the ground without loss of consciousness  CT scan was negative  COVID test positive  Patient's oxygen levels were in the 80s requiring 2-3 L  Patient was treated for moderate pathway including vitamin-D, vitamin-C, zinc, Pepcid, dexamethasone and remdesivir  Urology was consulted and did not need any emergent evaluation but would follow up with him once he had completed his quarantine for cystoscopy  It was noted patient did not have any additional episodes of hematuria during his hospitalization  Patient was reporting improvement in his shortness of breath and decreased cough however evaluation prior to discharge did require 3 L of O2 to be continued at home  Patient was also noted to have a left greater than right resting hand tremor which patient stated had started about 1 week before [facility]  It was noted the tremor seemed to improve however neurology consult did not feel it was COVID related  Patient will need not urgent neurology follow-up  Patient was discharged with zinc, vitamin-C and atorvastatin for an additional week  Patient was to have completed labs for his diabetes as well as prostate December which he did not get completed  A1c completed during the hospitalization controlled at 6 9%  States feeling well states cough and SOB significantly improved  States only using the O2 when he thinks needs it  Is able to go up and down the stairs and sometimes will need the O2    States eating regular foods, soup, is drinking water  No N/V/D    States sleeping is better past 3-4 nights before that frequent waking due to cough or using the bathroom  States no more episdes of blood in urine Has appointment with Urologist on 3/1/2021    Discussed with patient very happy that he is feeling a lot better    Advised we will still get him scheduled for another virtual visit  Based on his hospitalization should be able to come out of quarantine this Sunday and can then have follow-up appointment scheduled in person  Review of Systems   Constitutional: Negative  Negative for chills, fatigue (a little tired) and fever  Respiratory: Negative for cough and shortness of breath (significant improvement)  Cardiovascular: Negative for chest pain  Gastrointestinal: Negative for abdominal pain, constipation, diarrhea and vomiting  Genitourinary: Negative for frequency, hematuria and urgency  Musculoskeletal: Positive for myalgias (still some neck pain but improving)  Neurological: Negative for dizziness, light-headedness and headaches  Objective: There were no vitals filed for this visit  Physical Exam  Vitals signs and nursing note reviewed  Constitutional:       General: He is not in acute distress  Appearance: He is not ill-appearing  Comments: Pleasant, smiling and laughing   HENT:      Head: Normocephalic  Eyes:      Conjunctiva/sclera: Conjunctivae normal    Pulmonary:      Effort: Pulmonary effort is normal  No respiratory distress  Comments: On video visit patient was not wearing his oxygen  It was next to him but as noted he reports he does not need it except maybe once a day if he goes up and down the stairs a lot  No coughing during visit  Patient was able to speak and laugh without shortness of breath  Abdominal:      Tenderness: There is no abdominal tenderness (Patient denied any discomfort pushing on his abdomen  )  Neurological:      Mental Status: He is alert  Comments: Not able to assess for tremor on video as video had very limited quality and kept freezing even when audio was working  Will need to assess tremor in the office  Psychiatric:         Mood and Affect: Mood normal          Thought Content:  Thought content normal              Transitional Care Management Review:  Armani Bridges is a 78 y o  male here for TCM follow up  During the TCM phone call patient stated:    TCM Call (since 1/17/2021)     Date and time call was made  2/15/2021 10:16 AM    Hospital care reviewed  Records reviewed    Patient was hospitial[facility] at  [facility]        Date of Admission  02/07/21    Date of discharge  02/11/21    Diagnosis  PNEUMONIA DUE TO COVID - U07 1    Disposition  Home (Comment)  LIVES WITH HIS FAMILY    Were the patients medications reviewed and updated  Yes    Current Symptoms  None      TCM Call (since 1/17/2021)     Post hospital issues  Reduced activity (Comment)  PT  DISCHARGED ON O2 AT 3 LITERS    Should patient be enrolled in anticoag monitoring? No    Scheduled for follow up? Yes (Comment)  2/17/2021 VIRTUAL TCM WITH TERESA CABELLO    Did you obtain your prescribed medications  Yes (Comment)  USES [facility] [address]  Do you need help managing your prescriptions or medications  No (Comment)  PT  IS ABLE TO MANAGE HIS OWN MEDS    Is transportation to your appointment needed  No    I have advised the patient to call PCP with any new or worsening symptoms  Chelsie Wasserman LPN    Living Arrangements  Family members    Are you recieving any outpatient services  No    Are you recieving home care services  No    Are you using any community resources  No    Have you fallen in the last 12 months  No    Interperter language line needed  No    Counseling  Patient    Counseling topics  instructions for management; Importance of RX compliance          I spent 38 minutes with the patient during this visit      Angela Carson PA-C

## 2021-02-17 NOTE — PATIENT INSTRUCTIONS
On your visit today we discussed your recent hospitalization for bilateral COVID pneumonia  You confirm you received and will be completing the vitamin-C, atorvastatin and zinc tomorrow  You also confirm that you are feeling much improved  Occasional shortness of breath if you go up and down the stairs a lot  As we discussed you are not utilizing the oxygen on a regular basis but also have not been checking your oxygen level  You state you do not have any shortness of breath unless you go up and down the stairs multiple times  You report sleeping very well the past 3 nights without waking  You report you are eating regular meals and drinking plenty of liquids  You also confirm you have not had another episode of blood in your urine and  also confirm you are scheduled with the urologist on March 1st     As per our discussion we will get you scheduled for an additional virtual follow-up to monitor your symptoms  At this time your quarantine is scheduled and on February 21st   After that time we will then get you scheduled for a visit in the office

## 2021-02-18 DIAGNOSIS — U07.1 PNEUMONIA DUE TO COVID-19 VIRUS: ICD-10-CM

## 2021-02-18 DIAGNOSIS — J12.82 PNEUMONIA DUE TO COVID-19 VIRUS: ICD-10-CM

## 2021-02-19 ENCOUNTER — TELEMEDICINE (OUTPATIENT)
Dept: INTERNAL MEDICINE CLINIC | Facility: CLINIC | Age: 71
End: 2021-02-19

## 2021-02-19 DIAGNOSIS — U07.1 PNEUMONIA DUE TO COVID-19 VIRUS: ICD-10-CM

## 2021-02-19 DIAGNOSIS — E11.9 TYPE 2 DIABETES, DIET CONTROLLED (HCC): Chronic | ICD-10-CM

## 2021-02-19 DIAGNOSIS — B35.3 TINEA PEDIS OF BOTH FEET: Primary | Chronic | ICD-10-CM

## 2021-02-19 DIAGNOSIS — J12.82 PNEUMONIA DUE TO COVID-19 VIRUS: ICD-10-CM

## 2021-02-19 PROCEDURE — 1111F DSCHRG MED/CURRENT MED MERGE: CPT | Performed by: PHYSICIAN ASSISTANT

## 2021-02-19 PROCEDURE — 99213 OFFICE O/P EST LOW 20 MIN: CPT | Performed by: PHYSICIAN ASSISTANT

## 2021-02-19 RX ORDER — KETOCONAZOLE 20 MG/G
CREAM TOPICAL DAILY
Qty: 30 G | Refills: 1 | Status: SHIPPED | OUTPATIENT
Start: 2021-02-19 | End: 2021-03-11 | Stop reason: ALTCHOICE

## 2021-02-19 RX ORDER — ASCORBIC ACID 500 MG
TABLET ORAL
Qty: 14 TABLET | OUTPATIENT
Start: 2021-02-19

## 2021-02-19 NOTE — PROGRESS NOTES
COVID-19 Virtual Visit     Assessment/Plan:    Problem List Items Addressed This Visit        Endocrine    Type 2 diabetes, diet controlled ([facility] [place] [address] ) (Chronic)       Respiratory    Pneumonia due to COVID-19 virus       Musculoskeletal and Integument    Tinea pedis of both feet - Primary (Chronic)    Relevant Medications    ketoconazole (NIZORAL) 2 % cream         Disposition: On your virtual follow-up visit for COVID-19 infection we discussed that you are feeling significantly better  We confirmed you have had no significant cough, no fever or diarrhea in greater than 24 hours  You report no longer using the oxygen and not feeling any shortness of breath at rest or with activity  You confirm eating and drinking well  We reviewed that you have met CDC guidelines and may leave quarantine  We did review however that wearing a mask, hand washing and social distancing is still required  I would ask that you avoid direct contact with your wife until at least Monday  Also reminded you to please get your labs completed that were due back in December and when I have those results we will get you scheduled for a visit in the office to review  I have spent 13 minutes directly with the patient  Greater than 50% of this time was spent in counseling/coordination of care regarding: prognosis, instructions for management, patient and family education, risk factor reductions and impressions          Encounter provider Paris Yousif PA-C    Provider located at [facility] [address] [place] [address] [phone]    Recent Visits  Date Type Provider Dept   02/17/21 Telemedicine Paris Yousif PA-C  [address] [place] [address] recent visits within past 7 days and meeting all other requirements     Today's Visits  Date Type Provider Dept   02/19/21 Telemedicine Paris Yousif [address] [place] [address] today's visits and meeting all other requirements     Future Appointments  No visits were found meeting these conditions  Showing future appointments within next 150 days and meeting all other requirements      This virtual check-in was done via [employer] and patient was informed that this is a secure, HIPAA-compliant platform  He agrees to proceed  Patient agrees to participate in a virtual check in via telephone or video visit instead of presenting to the office to address urgent/immediate medical needs  Patient is aware this is a billable service  After connecting through [facility], the patient was identified by name and date of birth  Jalen Yepez was informed that this was a telemedicine visit and that the exam was being conducted confidentially over secure lines  Jalen Yepez acknowledged consent and understanding of privacy and security of the telemedicine visit  I informed the patient that I have reviewed his record in Epic and presented the opportunity for him to ask any questions regarding the visit today  The patient agreed to participate  Subjective:   Jalen Yepez is a 78 y o  male who has been screened for COVID-19  Patient's symptoms include cough (minimal states maybe 5x a day not fits)  Patient denies fatigue (still a little tired), anosmia, loss of taste, shortness of breath, chest tightness, abdominal pain, nausea, vomiting, diarrhea and myalgias  Date of symptom onset: 2/1/2021  Date of positive COVID-19 PCR: 2/7/2021    This is 2nd virtual follow-up visit for this patient  Previous visit on February 17th was for TCM after hospitalization for COVID-19  On previous visit patient was already reporting significant improvement in symptoms  He was still however reporting occasional shortness of breath but was not utilizing oxygen on a regular basis at that time      States I feel "fantastic"  Eating and drinking well    Patient states may be 4-5 times in a day he will have 1 single dry cough but no coughing fits   He states there is no associated shortness of breath or difficulty breathing  Patient also asking for a refill of the kidney consult cream for his tinea pedis as he was never able to pick it up previously  Also reminded patient to get his labs completed  Patient states his wife is doing well but he has not seen her secondary to his isolation  He states she was not positive for COVID but as she has severe CKD on dialysis he has made sure to stay totally isolated from her  Advised patient would recommend that he does not have contact with his wife until early next week only because he did have more significant disease with bilateral COVID pneumonia  Lab Results   Component Value Date    SARSCOV2 Positive (A) 02/07/2021     Past Medical History:   Diagnosis Date    Prediabetes      History reviewed  No pertinent surgical history    Current Outpatient Medications   Medication Sig Dispense Refill    ascorbic acid (VITAMIN C) 1000 MG tablet Take 1 tablet (1,000 mg total) by mouth every 12 (twelve) hours for 14 doses 6 tablet 0    atorvastatin (LIPITOR) 40 mg tablet Take 1 tablet (40 mg total) by mouth daily with dinner 14 tablet 0    benzonatate (TESSALON) 200 MG capsule Take 1 capsule (200 mg total) by mouth 3 (three) times a day as needed for cough 30 capsule 1    Blood Pressure Monitoring (BLOOD PRESSURE MONITOR/M CUFF) MISC by Does not apply route daily 1 each 0    fluticasone (FLONASE) 50 mcg/act nasal spray 2 sprays into each nostril daily 1 Bottle 0    loratadine (CLARITIN) 10 mg tablet Take 1 tablet (10 mg total) by mouth daily 30 tablet 0    meloxicam (MOBIC) 15 mg tablet Take 1 tablet (15 mg total) by mouth daily In the morning with food for neck pain 30 tablet 0    methocarbamol (ROBAXIN) 500 mg tablet Take 1 tablet (500 mg total) by mouth daily at bedtime For neck pain 30 tablet 0    multivitamin-minerals (CENTRUM ADULTS) tablet Take 1 tablet by mouth daily 30 tablet 0  zinc sulfate (ZINCATE) 220 mg capsule Take 1 capsule (220 mg total) by mouth daily for 7 days 7 capsule 0    ketoconazole (NIZORAL) 2 % cream Apply topically daily 30 g 1     No current facility-administered medications for this visit  No Known Allergies    Review of Systems   Constitutional: Negative for fatigue (still a little tired)  Respiratory: Positive for cough (minimal states maybe 5x a day not fits)  Negative for chest tightness and shortness of breath  Gastrointestinal: Negative for abdominal pain, diarrhea, nausea and vomiting  Musculoskeletal: Negative for myalgias  Objective: There were no vitals filed for this visit  Physical Exam  Constitutional:       General: He is not in acute distress  Appearance: He is not ill-appearing  HENT:      Head: Normocephalic  Eyes:      Conjunctiva/sclera: Conjunctivae normal    Pulmonary:      Effort: Pulmonary effort is normal  No respiratory distress  Neurological:      Mental Status: He is alert  Mental status is at baseline  Psychiatric:         Mood and Affect: Mood normal          Thought Content: Thought content normal        VIRTUAL VISIT DISCLAIMER    Steve Roberson acknowledges that he has consented to an online visit or consultation  He understands that the online visit is based solely on information provided by him, and that, in the absence of a face-to-face physical evaluation by the physician, the diagnosis he receives is both limited and provisional in terms of accuracy and completeness  This is not intended to replace a full medical face-to-face evaluation by the physician  Steve Roberson understands and accepts these terms

## 2021-02-24 DIAGNOSIS — U07.1 PNEUMONIA DUE TO COVID-19 VIRUS: ICD-10-CM

## 2021-02-24 DIAGNOSIS — J12.82 PNEUMONIA DUE TO COVID-19 VIRUS: ICD-10-CM

## 2021-02-24 RX ORDER — ATORVASTATIN CALCIUM 40 MG/1
TABLET, FILM COATED ORAL
Qty: 14 TABLET | Refills: 0 | OUTPATIENT
Start: 2021-02-24

## 2021-03-01 ENCOUNTER — PROCEDURE VISIT (OUTPATIENT)
Dept: UROLOGY | Facility: AMBULATORY SURGERY CENTER | Age: 71
End: 2021-03-01
Payer: MEDICARE

## 2021-03-01 VITALS
DIASTOLIC BLOOD PRESSURE: 88 MMHG | BODY MASS INDEX: 27.64 KG/M2 | HEIGHT: 66 IN | HEART RATE: 64 BPM | WEIGHT: 172 LBS | SYSTOLIC BLOOD PRESSURE: 158 MMHG

## 2021-03-01 DIAGNOSIS — R31.0 GROSS HEMATURIA: Primary | ICD-10-CM

## 2021-03-01 LAB
SL AMB  POCT GLUCOSE, UA: NORMAL
SL AMB LEUKOCYTE ESTERASE,UA: NORMAL
SL AMB POCT BILIRUBIN,UA: NORMAL
SL AMB POCT BLOOD,UA: NORMAL
SL AMB POCT CLARITY,UA: CLEAR
SL AMB POCT COLOR,UA: YELLOW
SL AMB POCT KETONES,UA: NORMAL
SL AMB POCT NITRITE,UA: NORMAL
SL AMB POCT PH,UA: 6
SL AMB POCT SPECIFIC GRAVITY,UA: 1.01
SL AMB POCT URINE PROTEIN: NORMAL
SL AMB POCT UROBILINOGEN: 0.2

## 2021-03-01 PROCEDURE — 52000 CYSTOURETHROSCOPY: CPT | Performed by: UROLOGY

## 2021-03-01 PROCEDURE — 81002 URINALYSIS NONAUTO W/O SCOPE: CPT | Performed by: UROLOGY

## 2021-03-01 NOTE — LETTER
March 1, 2021     Charles Ceja PA-C  [address] [facility]    Patient: Jesusita Greenberg   YOB: 1950   Date of Visit: 3/1/2021       Dear Dr Rika eBll: Thank you for referring Jesusita Greenberg to me for evaluation  Below are my notes for this consultation  If you have questions, please do not hesitate to call me  I look forward to following your patient along with you  Sincerely,        Denver Fey, MD        CC: No Recipients  Denver Fey, MD  3/1/2021 10:26 AM  Sign when Signing Visit    Patient was seen while hospitalized recently with COVID pneumonia  He was found to have gross hematuria as 1 of his initial symptoms  This resolved spontaneously  Did not have any trauma associated with this  He was not taking any anticoagulants  He has never been a smoker  He presents to the office today for cystoscopy  I personally reviewed CT scan chest, abdomen, pelvis which did not reveal any urinary tract abnormality other than significant prostate enlargement with median lobe  Interestingly, he denies any lower urinary tract symptoms  He has nocturia once per night which has been chronic and not bothersome  He denies any daytime symptoms or difficulty with urinary flow  Cystoscopy     Date/Time 3/1/2021 10:23 AM     Performed by  Denver Fey, MD     Authorized by Denver Fey, MD          Procedure Details:  Procedure type: cystoscopy    Additional Procedure Details:   Patient was prepped with Betadine  2% lidocaine jelly was instilled per urethra  The 16 [place] flexible cystoscope was placed  Prostate enlargement was immediately identified  On entry to the bladder, the scope could not be flexed downward  Careful evaluation of the bladder mucosa is not reveal any suspicious mass or lesion  On retroflexion, a very large median lobe component of the prostate is noted    On withdrawal of the scope he was able to see kissing bilateral lobes  Plan   We discussed findings today  Fortunately, there is no evidence of malignancy  He did have PSA in 2018 of 2 2  He is due for another PSA  We discussed potential intervention for his enlarged prostate, although he is now asymptomatic and interested in observation  Of note, he is planning to move back to [place] in the next several months  He does not feel that he needs to follow-up here unless he develops urinary symptoms in the interim  He is encouraged to establish care with a urologist, as he will likely require prostate follow-up and potentially treatment in the near future  He was given a photo of his cystoscopy pictures today and will notify us for records transfer

## 2021-03-01 NOTE — PROGRESS NOTES
Patient was seen while hospitalized recently with COVID pneumonia  He was found to have gross hematuria as 1 of his initial symptoms  This resolved spontaneously  Did not have any trauma associated with this  He was not taking any anticoagulants  He has never been a smoker  He presents to the office today for cystoscopy  I personally reviewed CT scan chest, abdomen, pelvis which did not reveal any urinary tract abnormality other than significant prostate enlargement with median lobe  Interestingly, he denies any lower urinary tract symptoms  He has nocturia once per night which has been chronic and not bothersome  He denies any daytime symptoms or difficulty with urinary flow  Cystoscopy     Date/Time 3/1/2021 10:23 AM     Performed by  Christopher Enriquez MD     Authorized by Christopher Enriquez MD          Procedure Details:  Procedure type: cystoscopy    Additional Procedure Details:   Patient was prepped with Betadine  2% lidocaine jelly was instilled per urethra  The 16 Swazi flexible cystoscope was placed  Prostate enlargement was immediately identified  On entry to the bladder, the scope could not be flexed downward  Careful evaluation of the bladder mucosa is not reveal any suspicious mass or lesion  On retroflexion, a very large median lobe component of the prostate is noted  On withdrawal of the scope he was able to see kissing bilateral lobes  Plan   We discussed findings today  Fortunately, there is no evidence of malignancy  He did have PSA in 2018 of 2 2  He is due for another PSA  We discussed potential intervention for his enlarged prostate, although he is now asymptomatic and interested in observation  Of note, he is planning to move back to [place] in the next several months  He does not feel that he needs to follow-up here unless he develops urinary symptoms in the interim    He is encouraged to establish care with a urologist, as he will likely require prostate follow-up and potentially treatment in the near future  He was given a photo of his cystoscopy pictures today and will notify us for records transfer  No

## 2021-03-04 ENCOUNTER — LAB (OUTPATIENT)
Dept: LAB | Facility: HOSPITAL | Age: 71
End: 2021-03-04
Payer: COMMERCIAL

## 2021-03-04 DIAGNOSIS — N17.9 AKI (ACUTE KIDNEY INJURY) (HCC): ICD-10-CM

## 2021-03-04 DIAGNOSIS — E11.9 TYPE 2 DIABETES, DIET CONTROLLED (HCC): Chronic | ICD-10-CM

## 2021-03-04 LAB
ALBUMIN SERPL BCP-MCNC: 3.5 G/DL (ref 3.5–5)
ALP SERPL-CCNC: 45 U/L (ref 46–116)
ALT SERPL W P-5'-P-CCNC: 46 U/L (ref 12–78)
ANION GAP SERPL CALCULATED.3IONS-SCNC: 4 MMOL/L (ref 4–13)
AST SERPL W P-5'-P-CCNC: 25 U/L (ref 5–45)
BILIRUB SERPL-MCNC: 0.57 MG/DL (ref 0.2–1)
BUN SERPL-MCNC: 10 MG/DL (ref 5–25)
CALCIUM SERPL-MCNC: 9.2 MG/DL (ref 8.3–10.1)
CHLORIDE SERPL-SCNC: 109 MMOL/L (ref 100–108)
CHOLEST SERPL-MCNC: 118 MG/DL (ref 50–200)
CO2 SERPL-SCNC: 30 MMOL/L (ref 21–32)
CREAT SERPL-MCNC: 0.98 MG/DL (ref 0.6–1.3)
CREAT UR-MCNC: 151 MG/DL
EST. AVERAGE GLUCOSE BLD GHB EST-MCNC: 163 MG/DL
GFR SERPL CREATININE-BSD FRML MDRD: 91 ML/MIN/1.73SQ M
GLUCOSE P FAST SERPL-MCNC: 107 MG/DL (ref 65–99)
HBA1C MFR BLD: 7.3 %
HDLC SERPL-MCNC: 42 MG/DL
LDLC SERPL CALC-MCNC: 49 MG/DL (ref 0–100)
MICROALBUMIN UR-MCNC: 10.6 MG/L (ref 0–20)
MICROALBUMIN/CREAT 24H UR: 7 MG/G CREATININE (ref 0–30)
POTASSIUM SERPL-SCNC: 4.5 MMOL/L (ref 3.5–5.3)
PROT SERPL-MCNC: 7.4 G/DL (ref 6.4–8.2)
SODIUM SERPL-SCNC: 143 MMOL/L (ref 136–145)
TRIGL SERPL-MCNC: 137 MG/DL

## 2021-03-04 PROCEDURE — 80061 LIPID PANEL: CPT

## 2021-03-04 PROCEDURE — 82570 ASSAY OF URINE CREATININE: CPT | Performed by: PHYSICIAN ASSISTANT

## 2021-03-04 PROCEDURE — 80053 COMPREHEN METABOLIC PANEL: CPT

## 2021-03-04 PROCEDURE — 36415 COLL VENOUS BLD VENIPUNCTURE: CPT

## 2021-03-04 PROCEDURE — 82043 UR ALBUMIN QUANTITATIVE: CPT | Performed by: PHYSICIAN ASSISTANT

## 2021-03-04 PROCEDURE — 3061F NEG MICROALBUMINURIA REV: CPT | Performed by: PHYSICIAN ASSISTANT

## 2021-03-04 PROCEDURE — 83036 HEMOGLOBIN GLYCOSYLATED A1C: CPT

## 2021-03-04 PROCEDURE — 3051F HG A1C>EQUAL 7.0%<8.0%: CPT | Performed by: PHYSICIAN ASSISTANT

## 2021-03-08 ENCOUNTER — TRANSCRIBE ORDERS (OUTPATIENT)
Dept: ADMISSIONS | Facility: HOSPITAL | Age: 71
End: 2021-03-08

## 2021-03-11 ENCOUNTER — OFFICE VISIT (OUTPATIENT)
Dept: INTERNAL MEDICINE CLINIC | Facility: CLINIC | Age: 71
End: 2021-03-11

## 2021-03-11 VITALS
BODY MASS INDEX: 28.93 KG/M2 | WEIGHT: 180 LBS | SYSTOLIC BLOOD PRESSURE: 146 MMHG | TEMPERATURE: 97.6 F | OXYGEN SATURATION: 95 % | HEART RATE: 88 BPM | DIASTOLIC BLOOD PRESSURE: 86 MMHG | HEIGHT: 66 IN

## 2021-03-11 DIAGNOSIS — E78.00 PURE HYPERCHOLESTEROLEMIA: ICD-10-CM

## 2021-03-11 DIAGNOSIS — I44.0 FIRST DEGREE AV BLOCK: Chronic | ICD-10-CM

## 2021-03-11 DIAGNOSIS — R25.1 TREMOR OF BOTH HANDS: ICD-10-CM

## 2021-03-11 DIAGNOSIS — R25.1 TREMOR OF LEFT HAND: ICD-10-CM

## 2021-03-11 DIAGNOSIS — I10 ESSENTIAL HYPERTENSION: Primary | Chronic | ICD-10-CM

## 2021-03-11 DIAGNOSIS — E66.3 OVERWEIGHT: Chronic | ICD-10-CM

## 2021-03-11 DIAGNOSIS — E11.65 TYPE 2 DIABETES MELLITUS WITH HYPERGLYCEMIA, WITHOUT LONG-TERM CURRENT USE OF INSULIN (HCC): ICD-10-CM

## 2021-03-11 DIAGNOSIS — Z86.16 HISTORY OF COVID-19: ICD-10-CM

## 2021-03-11 PROBLEM — J12.82 PNEUMONIA DUE TO COVID-19 VIRUS: Status: RESOLVED | Noted: 2021-02-07 | Resolved: 2021-03-11

## 2021-03-11 PROBLEM — U07.1 PNEUMONIA DUE TO COVID-19 VIRUS: Status: RESOLVED | Noted: 2021-02-07 | Resolved: 2021-03-11

## 2021-03-11 PROBLEM — E11.9 TYPE 2 DIABETES, DIET CONTROLLED (HCC): Chronic | Status: RESOLVED | Noted: 2019-07-26 | Resolved: 2021-03-11

## 2021-03-11 PROCEDURE — 99213 OFFICE O/P EST LOW 20 MIN: CPT | Performed by: PHYSICIAN ASSISTANT

## 2021-03-11 PROCEDURE — 1111F DSCHRG MED/CURRENT MED MERGE: CPT | Performed by: PHYSICIAN ASSISTANT

## 2021-03-11 PROCEDURE — 3725F SCREEN DEPRESSION PERFORMED: CPT | Performed by: PHYSICIAN ASSISTANT

## 2021-03-11 NOTE — PROGRESS NOTES
Assessment/Plan: On your visit today we discussed your lab results  Labs confirm that your sugar has increased it is now 7 3%, your cholesterol levels have also increased  We did discuss the importance of getting these under better control but at this time you declined to be started on medications  You also admit that you were not following your normal healthy diet and were eating more food than usual   You plan to resume your normal healthy eating and you do admit that you exercise with walking up to 2 miles on a regular basis  We also reviewed that overall you feel you have recovered well since your COVID 19 pneumonia  You do however admit that while still able to walk 2 miles you do get more respiratory fatigue and have to slow down at times  You deny any chest pain or associated symptoms at that time  We also reviewed that you followed up with the urologist after episode of hematuria  Cystoscopy did not find any evidence of malignancy but your scan did show significantly enlarged prostate  At this time you are reporting nocturia x1 only but you are aware of other treatment options for enlarged prostate should you choose to pursue them  We also reviewed that you still have left greater than right hand tremor with intention that goes away when you rest her hand in her lap  This is something that does need to be further evaluated  However on today's visit you do share that at the end of this month you will be going back to [place]  This is where you live in the past and you do have family member there  You also have family here and are going back to assess living and working situation  You report you plan to make a decision over the next 3-4 months where you will and up living  We discussed should you choose to continue your care with our office your more than welcome to schedule an appointment once you return to this area    We did review however it is very important that you get your self scheduled for routine medical care in [place] as well  No problem-specific Assessment & Plan notes found for this encounter  Diagnoses and all orders for this visit:    Essential hypertension    Tremor of left hand    Type 2 diabetes mellitus with hyperglycemia, without long-term current use of insulin (HCC)    Tremor of both hands    History of COVID-19    Overweight    Pure hypercholesterolemia    First degree AV block          Subjective:      Patient ID: Steve Roberson is a 78 y o  male  Patient presents today for follow-up of chronic medical conditions and lab review  Patient was also recently followed for COVID-19 pneumonia  Patient did have multiple virtual visits and fortunately did recover OK  He was admitted from February 7th to February 11th  On discussion today patient reports has noticed he does have slightly less exercise capacity after COVID than he did before  Patient states he is still able to walk 2 miles but he does get some respiratory fatigue  He has not had any shortness of breath or difficulty breathing and denies any chest pain  Patient did not know he had COVID at the time and had presented to the emergency room with complaint of 1 episode of hematuria  Patient has since followed up with the urologist on March 1st   Patient did have a CT scan completed during hospitalization that did not show any abnormalities other than in large prostate  Patient has been minimally symptomatic with nocturia x1 not bothersome  Cystoscopy was completed with no evidence of cancer or worrisome symptoms  Patient was advised on PSA and discussed options for intervention for enlarged prostate however patient did not feel any were needed at this time  Patient's labs do show however that his A1c has slightly increased  It is now 7 3%  Admits was not following his diet but has since improved, Was 6 6 % on diet only    Lipid profile is excellent actually improved    Patient however was also on atorvastatin for a few weeks during treatment for COVID which likely also help to improve his levels  Did review with patient once again diabetic eye lines of being on a statin even know his sugar is not significantly elevated we reviewed that diabetes even well-known well controlled does increased risk for cardiovascular events  Patient however once again declines and he is well-known for admitting he does not like to take medications  We also reviewed that his blood pressure remains elevated on his visit again today  Once again patient is declining medications  He is aware of increased risk of heart attack and stroke but states he would prefer to try improvement in diet  Patient states will be moving back to [place] end of this month  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  Has Family there  Patient however also has family/children in this area  He states he is going to be going back down [facility] to assess if there is employment opportunities and what the housing situation is  He states he will make a decision in the next 3-4 months if he plans to stay there or return here  Discussed with patient he is always welcome to return here but if he is planning on staying [place] for any extended period of time to make sure he gets his medical coverage there to remain under medical treatments  Did also discussed with patient that he still has the bilateral but more so on the right hand tremors  Patient states though started approximately 4-6 months previously  Patient states he had has not really noticed it significantly and not interfering with activities of daily living  Advised patient this is not something to be ignored and should be evaluated but as noted patient is leaving the state in approximately 2-3 weeks        The following portions of the patient's history were reviewed and updated as appropriate: allergies, current medications, past family history, past medical history, past social history, past surgical history and problem list     Review of Systems   Constitutional: Negative  Negative for chills and fever  HENT: Negative  Eyes: Negative for visual disturbance  Respiratory: Negative for cough and chest tightness  Patient reports breathing is good but does admit with his walking has had to slow down some breathing fatigue earlier, still walking about 2 miles  Cardiovascular: Negative  Negative for chest pain and palpitations  Gastrointestinal: Negative  Negative for abdominal pain  Endocrine: Negative  Negative for polydipsia, polyphagia and polyuria  Genitourinary: Negative  Negative for difficulty urinating, frequency and urgency  Nocturia x1   Musculoskeletal: Negative  Neurological: Positive for tremors  Negative for dizziness, syncope, speech difficulty, weakness, light-headedness and headaches  Psychiatric/Behavioral: Negative  Objective:      /86 (BP Location: Left arm, Patient Position: Sitting, Cuff Size: Standard)   Pulse 88   Temp 97 6 °F (36 4 °C) (Temporal)   Ht 5' 6" (1 676 m)   Wt 81 6 kg (180 lb)   SpO2 95%   BMI 29 05 kg/m²          Physical Exam  Vitals signs and nursing note reviewed  Constitutional:       General: He is not in acute distress  Appearance: Normal appearance  HENT:      Head: Normocephalic  Eyes:      Extraocular Movements: Extraocular movements intact  Neck:      Musculoskeletal: Neck supple  Vascular: No carotid bruit  Cardiovascular:      Rate and Rhythm: Normal rate and regular rhythm  Heart sounds: Normal heart sounds  Pulmonary:      Effort: Pulmonary effort is normal       Breath sounds: Normal breath sounds  Abdominal:      General: Bowel sounds are normal    Neurological:      Mental Status: He is alert  Cranial Nerves: No cranial nerve deficit  Motor: Tremor present   No weakness, atrophy, abnormal muscle tone or pronator drift  Comments: Mild tremor to L hand with holding out resolves with hand placed in his lap    No rigidity or cogwheeling    Normal arm swing and gait   Psychiatric:         Mood and Affect: Mood normal          Thought Content:  Thought content normal

## 2021-03-11 NOTE — PATIENT INSTRUCTIONS
On your visit today we discussed your lab results  Labs confirm that your sugar has increased it is now 7 3%, your cholesterol levels have also increased  We did discuss the importance of getting these under better control but at this time you declined to be started on medications  You also admit that you were not following your normal healthy diet and were eating more food than usual   You plan to resume your normal healthy eating and you do admit that you exercise with walking up to 2 miles on a regular basis  We also reviewed that overall you feel you have recovered well since your COVID 19 pneumonia  You do however admit that while still able to walk 2 miles you do get more respiratory fatigue and have to slow down at times  You deny any chest pain or associated symptoms at that time  We also reviewed that you followed up with the urologist after episode of hematuria  Cystoscopy did not find any evidence of malignancy but your scan did show significantly enlarged prostate  At this time you are reporting nocturia x1 only but you are aware of other treatment options for enlarged prostate should you choose to pursue them  We also reviewed that you still have left greater than right hand tremor with intention that goes away when you rest her hand in her lap  This is something that does need to be further evaluated  However on today's visit you do share that at the end of this month you will be going back to [place]  This is where you live in the past and you do have family member there  You also have family here and are going back to assess living and working situation  You report you plan to make a decision over the next 3-4 months where you will and up living  We discussed should you choose to continue your care with our office your more than welcome to schedule an appointment once you return to this area    We did review however it is very important that you get your self scheduled for routine medical care in [place] as well

## 2021-03-12 ENCOUNTER — TELEPHONE (OUTPATIENT)
Dept: INTERNAL MEDICINE CLINIC | Facility: CLINIC | Age: 71
End: 2021-03-12

## 2021-03-12 NOTE — TELEPHONE ENCOUNTER
Patient called requesting we contact [phone] to have them to  oxygen tanks from home  He also provided a fax # [phone]  Per Sandra Garcia she contacted [phone] [place] stated we would need to place an order in the system to discontinue the oxygen   Please review & thank you

## 2021-03-12 NOTE — TELEPHONE ENCOUNTER
Can you place an discontinue O2 order in the system so we can fax to [facility] per patients request

## 2021-03-12 NOTE — TELEPHONE ENCOUNTER
Patient is requesting antibiotics for Pneumonia still having symptoms from last inpatient visit   Please review

## 2021-03-15 DIAGNOSIS — Z86.16 HISTORY OF COVID-19: Primary | ICD-10-CM

## 2021-03-16 ENCOUNTER — OFFICE VISIT (OUTPATIENT)
Dept: INTERNAL MEDICINE CLINIC | Facility: CLINIC | Age: 71
End: 2021-03-16

## 2021-03-16 VITALS
SYSTOLIC BLOOD PRESSURE: 173 MMHG | WEIGHT: 180 LBS | OXYGEN SATURATION: 97 % | DIASTOLIC BLOOD PRESSURE: 87 MMHG | BODY MASS INDEX: 28.93 KG/M2 | HEIGHT: 66 IN | TEMPERATURE: 97.6 F | HEART RATE: 65 BPM

## 2021-03-16 DIAGNOSIS — R03.0 ELEVATED BLOOD PRESSURE READING IN OFFICE WITH WHITE COAT SYNDROME, WITHOUT DIAGNOSIS OF HYPERTENSION: Primary | ICD-10-CM

## 2021-03-16 PROCEDURE — 99213 OFFICE O/P EST LOW 20 MIN: CPT | Performed by: PHYSICIAN ASSISTANT

## 2021-03-16 NOTE — PATIENT INSTRUCTIONS
On your visit today we discussed her question and concern about why you were not provided with antibiotics in the hospital when you were diagnosed with pneumonia  We discussed that you were diagnosed with COVID pneumonia which is a virus and does not require treatment with antibiotics  After full discussion you report understanding the difference between viral and bacterial pneumonia treatments  We also confirmed that you are checking your blood pressure at home and it is never greater than 140 while you are at home  We did review as you are aware that consistently elevated readings in the office  You do confirm understanding the risk of not treating elevated blood pressure not only heart attack and stroke but damage to vision in kidneys over time  You do confirm should you have elevated readings at home you will contact our office to discuss treatment

## 2021-03-16 NOTE — PROGRESS NOTES
Assessment/Plan: On your visit today we discussed her question and concern about why you were not provided with antibiotics in the hospital when you were diagnosed with pneumonia  We discussed that you were diagnosed with COVID pneumonia which is a virus and does not require treatment with antibiotics  After full discussion you report understanding the difference between viral and bacterial pneumonia treatments  We also confirmed that you are checking your blood pressure at home and it is never greater than 140 while you are at home  We did review as you are aware that consistently elevated readings in the office  You do confirm understanding the risk of not treating elevated blood pressure not only heart attack and stroke but damage to vision in kidneys over time  You do confirm should you have elevated readings at home you will contact our office to discuss treatment  No problem-specific Assessment & Plan notes found for this encounter  Diagnoses and all orders for this visit:    Elevated blood pressure reading in office with white coat syndrome, without diagnosis of hypertension          Subjective:      Patient ID: Vianney Shown is a 78 y o  male  Patient here as thought needed antibiotic for COVID pneumonia, as discussed was IP and then recovered at home  Continues to note improvement in symptoms  Patient did complete his follow-up visit with me in person and as discussed he had noted significant improvement and had started to walk again  Patient states however once he got home and was talking with his family he just remember the word pneumonia and was wondering why no one in the hospital gave him antibiotics  We discussed the difference that he had COVID pneumonia which is a virus and not bacterial pneumonia for which he would have been treated with antibiotics      Patient however continues to report feeling very well has no additional medical concerns and confirms understanding of why he was not provided with antibiotics while in the hospital     Once again blood pressure remains significantly elevated in the office  Patient states he does check it at home never above 140 while he is at home  We reviewed the importance of not ignoring elevated blood pressure in the risk for heart attack and stroke or damage to kidneys long-term  Patient states he understands and if his blood pressure goes above 140 he will contact our office  The following portions of the patient's history were reviewed and updated as appropriate: allergies, current medications, past family history, past medical history, past social history, past surgical history and problem list     Review of Systems   Constitutional: Negative for chills and fever  Respiratory: Negative  Negative for cough, chest tightness and shortness of breath  Cardiovascular: Negative  Gastrointestinal: Negative  Neurological: Negative for dizziness, light-headedness and headaches  Objective:      BP (!) 173/87 (BP Location: Left arm, Patient Position: Sitting, Cuff Size: Standard)   Pulse 65   Temp 97 6 °F (36 4 °C) (Temporal)   Ht 5' 6" (1 676 m)   Wt 81 6 kg (180 lb)   SpO2 97%   BMI 29 05 kg/m²          Physical Exam  Vitals signs and nursing note reviewed  Constitutional:       General: He is not in acute distress  Appearance: Normal appearance  He is not ill-appearing  Cardiovascular:      Rate and Rhythm: Normal rate and regular rhythm  Heart sounds: Normal heart sounds  Pulmonary:      Effort: Pulmonary effort is normal       Breath sounds: Normal breath sounds  No wheezing, rhonchi or rales  Skin:     General: Skin is warm and dry  Neurological:      Mental Status: He is alert  Mental status is at baseline

## 2021-03-25 ENCOUNTER — OFFICE VISIT (OUTPATIENT)
Dept: INTERNAL MEDICINE CLINIC | Facility: CLINIC | Age: 71
End: 2021-03-25

## 2021-03-25 VITALS
OXYGEN SATURATION: 98 % | WEIGHT: 179 LBS | TEMPERATURE: 97.6 F | BODY MASS INDEX: 28.77 KG/M2 | HEIGHT: 66 IN | DIASTOLIC BLOOD PRESSURE: 87 MMHG | SYSTOLIC BLOOD PRESSURE: 175 MMHG | HEART RATE: 65 BPM

## 2021-03-25 DIAGNOSIS — I10 ESSENTIAL HYPERTENSION: Primary | Chronic | ICD-10-CM

## 2021-03-25 PROCEDURE — 99213 OFFICE O/P EST LOW 20 MIN: CPT | Performed by: PHYSICIAN ASSISTANT

## 2021-03-25 PROCEDURE — 1124F ACP DISCUSS-NO DSCNMKR DOCD: CPT | Performed by: PHYSICIAN ASSISTANT

## 2021-03-25 PROCEDURE — 1036F TOBACCO NON-USER: CPT | Performed by: PHYSICIAN ASSISTANT

## 2021-03-25 PROCEDURE — 3077F SYST BP >= 140 MM HG: CPT | Performed by: PHYSICIAN ASSISTANT

## 2021-03-25 PROCEDURE — 1160F RVW MEDS BY RX/DR IN RCRD: CPT | Performed by: PHYSICIAN ASSISTANT

## 2021-03-25 PROCEDURE — 3079F DIAST BP 80-89 MM HG: CPT | Performed by: PHYSICIAN ASSISTANT

## 2021-03-25 PROCEDURE — 3008F BODY MASS INDEX DOCD: CPT | Performed by: PHYSICIAN ASSISTANT

## 2021-03-25 RX ORDER — AMLODIPINE BESYLATE 5 MG/1
5 TABLET ORAL DAILY
Qty: 90 TABLET | Refills: 0 | Status: SHIPPED | OUTPATIENT
Start: 2021-03-25

## 2021-03-25 NOTE — PATIENT INSTRUCTIONS
On your visit today we discussed that you made this appointment because your blood pressure was also elevated at home and you are concerned and would like to get it treated  We confirmed you never started the amlodipine last year    I have sent the prescription to your pharmacy  This is 1 tablet daily every morning  We also reviewed how to continue to check your blood pressure at home  Please wait 1 hour after your morning coffee  Be  Seated quietly at a table with your arm resting on the table  Wait 3-5 minutes then pushed the button on your machine  This information is also included in your discharge summary  You report you plan to return to this area in November and would contact our office for medical care at that time  We did discuss however because you will be living in [place] it is very important that you get established with a medical provider there  You will be able to bring your blood pressure log for evaluation at that time  How to Take a Blood Pressure   WHAT YOU NEED TO KNOW:   What is blood pressure? Blood pressure (BP) is the force of blood pushing on the walls of your arteries  Your BP results are written as 2 numbers  The first, or top, number is called systolic BP  This is the pressure caused by your heart pushing blood out to your body  The second, or bottom, number is called diastolic BP  This is the pressure when your heart relaxes and fills back up with blood  Ask your healthcare provider what your BP should be  For most people, a good BP goal is less than 120/80  Why do I need to take my BP? You may not have any signs or symptoms of high BP  You may need to take your BP regularly to know how often your BP is high  High BP increases your risk for a stroke, heart attack, or kidney disease  You may need to take medicine to keep your BP at a normal level  Write down and keep a log of your BP   Your healthcare provider can use the BP results in your log to see if your BP medicines are working  How often should I take my BP? Your healthcare provider may recommend that you take your BP 2 times a day  Take your BP at the same times each day, such as the morning and evening  Ask your healthcare provider when and how often you should take your BP  How do I take my BP? You can take your BP at home with a digital BP monitor  Read the instructions that came with your BP monitor  The monitor comes with an adjustable cuff  Ask your healthcare provider if your cuff is the correct size  · Do not eat, drink, smoke, or exercise for 30 minutes before you take your BP  · Rest quietly for 5 minutes before you take your BP  · Sit with your feet flat on the floor and your back against a chair  · Extend your arm and support it on a flat surface  Your arm should be at the same level as your heart  · Make sure all of the air is out of the cuff  Place the BP cuff against your bare skin about 1 inch (2 5 cm) above your elbow  Wrap the cuff snugly around your arm  The BP reading may not be correct if the cuff is too loose  · If you are using a wrist cuff, wrap the cuff snugly around your wrist  Hold your wrist at the same level as your heart  · Turn on the BP monitor and follow the directions  · Write down your BP, the date, the time, and which arm you used to take the BP  Take your BP 2 times and write down both readings  Use the same arm each time  These BP readings can be 1 minute apart  What else do I need to know? · Do not take a BP reading in an arm that is injured or has an IV or shunt  · Take your BP medicines as directed  Do not stop taking your medicines if your BP is at your goal   A BP at your goal means your medicine is working correctly  · Bring the BP machine to your follow-up visit  Your healthcare provider can check that you are using it correctly  When should I call my doctor?    · Your BP is higher or lower than you were told it should be     · You have questions or concerns about your condition or care  CARE AGREEMENT:   You have the right to help plan your care  Learn about your health condition and how it may be treated  Discuss treatment options with your healthcare providers to decide what care you want to receive  You always have the right to refuse treatment  The above information is an  only  It is not intended as medical advice for individual conditions or treatments  Talk to your doctor, nurse or pharmacist before following any medical regimen to see if it is safe and effective for you  © Copyright [address] Information is for End User's use only and may not be sold, redistributed or otherwise used for commercial purposes  All illustrations and images included in CareNotes® are the copyrighted property of A D A M , Inc  or [facility], Amb[facility] Care   GENERAL INFORMATION:   Hypertension  is high blood pressure (BP)  Your BP is the force of your blood moving against the walls of your arteries  Hypertension is a BP of 140/90 or higher  Hypertension causes your BP to get so high that your heart has to work much harder than normal  This can cause damage to your heart  Common symptoms include the following:   · Headache     · Blurred vision     · Chest pain     · Dizziness or weakness     · Trouble breathing    · Nosebleeds  Seek immediate care for the following symptoms:   · Severe headache or vision loss    · Weakness in an arm or leg    · Confusion or difficulty speaking    · Discomfort in your chest that feels like squeezing, pressure, fullness, or pain    · Suddenly feeling lightheaded or trouble breathing    · Pain or discomfort in your back, neck, jaw, stomach, or arm  Treatment for hypertension  may include medicine to lower your BP  You may also need to make lifestyle changes  Take your medicine exactly as directed  Manage hypertension:   · Take your BP at home    Sit and rest for 5 minutes before you take your BP  Extend your arm and support it on a flat surface  Your arm should be at the same level as your heart  Follow the directions that came with your BP monitor  If possible, take at least 2 BP readings each time  Take your BP at least twice a day at the same times each day, such as morning and evening  Keep a log of your BP readings and bring it to your follow-up visits  · Eat less sodium (salt)  Do not add sodium to your food  Limit foods that are high in sodium, such as canned foods, potato chips, and cold cuts  Your healthcare provider may suggest that you follow the [address] [place] [address]  The plan is low in sodium, unhealthy fats, and total fat  It is high in potassium, calcium, and fiber  · Exercise regularly  Exercise at least 30 minutes per day, on most days of the week  This will help decrease your BP  Ask your healthcare provider about the best exercise plan for you  · Limit alcohol  Women should limit alcohol to 1 drink a day  Men should limit alcohol to 2 drinks a day  A drink of alcohol is 12 ounces of beer, 5 ounces of wine, or 1½ ounces of liquor  · Do not smoke  If you smoke, it is never too late to quit  Smoking can increase your BP  Smoking also worsens other health conditions you may have that can increase your risk for hypertension  Ask your healthcare provider for information if you need help quitting  Follow up with your healthcare provider as directed: You will need to return to have your BP checked and to have other lab tests done  Write down your questions so you remember to ask them during your visits  CARE AGREEMENT:   You have the right to help plan your care  Learn about your health condition and how it may be treated  Discuss treatment options with your caregivers to decide what care you want to receive  You always have the right to refuse treatment  The above information is an  only   It is not intended as medical advice for individual conditions or treatments  Talk to your doctor, nurse or pharmacist before following any medical regimen to see if it is safe and effective for you  © 2014 [address] is for End User's use only and may not be sold, redistributed or otherwise used for commercial purposes  All illustrations and images included in CareNotes® are the copyrighted property of A D A M , Inc  or Nir Rodriguez

## 2021-03-25 NOTE — PROGRESS NOTES
Assessment/Plan: On your visit today we discussed that you made this appointment because your blood pressure was also elevated at home and you are concerned and would like to get it treated  We confirmed you never started the amlodipine last year    I have sent the prescription to your pharmacy  This is 1 tablet daily every morning  We also reviewed how to continue to check your blood pressure at home  Please wait 1 hour after your morning coffee  Be  Seated quietly at a table with your arm resting on the table  Wait 3-5 minutes then pushed the button on your machine  This information is also included in your discharge summary  You report you plan to return to this area in November and would contact our office for medical care at that time  We did discuss however because you will be living in [place] it is very important that you get established with a medical provider there  You will be able to bring your blood pressure log for evaluation at that time  No problem-specific Assessment & Plan notes found for this encounter  Diagnoses and all orders for this visit:    Essential hypertension  -     amLODIPine (NORVASC) 5 mg tablet; Take 1 tablet (5 mg total) by mouth daily          Subjective:      Patient ID: Hank Myers is a 78 y o  male  Patient presents today regarding starting back on blood pressure medication  As noted on previous visit patient was advised regarding his elevated blood pressure and the risk of not treating this  Patient had remain resistant and felt his blood pressure was better at home  Patient however recently contacted the office concerned and would like to be started on medication due to elevated readings at home as well    States at home was 156/96    Second reading today 168/85    States never started the amlodipine in the past   Patient states he is agreeable to starting that medication today as per our conversations and he was getting elevated readings at home as well  Patient not having any chest pain although he does occasionally get back pain  Patient states he has been taking a  81 mg aspirin daily  Did confirm with patient and he does confirm he is going back to [place] this Sunday states his plan is to come back here but not until November 2021  Patient states however there is a medical clinic in the area and I advised him he will need to get established with them within a few weeks to make sure someone checks in on his blood pressure and medication  Patient is agreeable to this plan  Especially as patient is leaving the state in a few days will put him on the amlodipine  Do not want to start him on an Ace as he will not be available to get follow-up kidney testing  The following portions of the patient's history were reviewed and updated as appropriate: allergies, current medications, past family history, past medical history, past social history, past surgical history and problem list     Review of Systems   Constitutional: Negative  Negative for chills and fever  HENT: Negative  Respiratory: Negative  Negative for cough, chest tightness and shortness of breath  Cardiovascular: Negative  Negative for chest pain  Musculoskeletal: Positive for back pain  Neurological: Negative  Negative for dizziness, light-headedness and headaches  Objective:      BP (!) 175/87 (BP Location: Left arm, Patient Position: Sitting, Cuff Size: Standard)   Pulse 65   Temp 97 6 °F (36 4 °C) (Temporal)   Ht 5' 6" (1 676 m)   Wt 81 2 kg (179 lb)   SpO2 98%   BMI 28 89 kg/m²          Physical Exam  Constitutional:       General: He is not in acute distress  Appearance: He is not ill-appearing or diaphoretic  HENT:      Head: Normocephalic  Eyes:      Extraocular Movements: Extraocular movements intact  Conjunctiva/sclera: Conjunctivae normal    Neck:      Vascular: No carotid bruit     Cardiovascular:      Rate and Rhythm: Normal rate and regular rhythm  Heart sounds: Normal heart sounds  Pulmonary:      Effort: Pulmonary effort is normal  No respiratory distress  Breath sounds: Normal breath sounds  Musculoskeletal:      Right lower leg: No edema  Left lower leg: No edema  Neurological:      Mental Status: He is alert  Cranial Nerves: Cranial nerve deficit present  Psychiatric:         Mood and Affect: Mood normal          Thought Content:  Thought content normal

## 2022-01-27 ENCOUNTER — OFFICE VISIT (OUTPATIENT)
Dept: INTERNAL MEDICINE CLINIC | Facility: CLINIC | Age: 72
End: 2022-01-27

## 2022-01-27 VITALS
HEART RATE: 62 BPM | BODY MASS INDEX: 29.54 KG/M2 | SYSTOLIC BLOOD PRESSURE: 148 MMHG | DIASTOLIC BLOOD PRESSURE: 73 MMHG | TEMPERATURE: 97.2 F | WEIGHT: 183 LBS

## 2022-01-27 DIAGNOSIS — H26.9 CATARACT OF BOTH EYES, UNSPECIFIED CATARACT TYPE: ICD-10-CM

## 2022-01-27 DIAGNOSIS — E11.65 TYPE 2 DIABETES MELLITUS WITH HYPERGLYCEMIA, WITHOUT LONG-TERM CURRENT USE OF INSULIN (HCC): Primary | ICD-10-CM

## 2022-01-27 LAB
LEFT EYE DIABETIC RETINOPATHY: NORMAL
LEFT EYE IMAGE QUALITY: NORMAL
LEFT EYE MACULAR EDEMA: NORMAL
LEFT EYE OTHER RETINOPATHY: NORMAL
RIGHT EYE DIABETIC RETINOPATHY: NORMAL
RIGHT EYE IMAGE QUALITY: NORMAL
RIGHT EYE MACULAR EDEMA: NORMAL
RIGHT EYE OTHER RETINOPATHY: NORMAL
SEVERITY (EYE EXAM): NORMAL
SL AMB POCT GLUCOSE BLD: 117
SL AMB POCT HEMOGLOBIN AIC: 6.7 (ref ?–6.5)

## 2022-01-27 PROCEDURE — 99214 OFFICE O/P EST MOD 30 MIN: CPT | Performed by: INTERNAL MEDICINE

## 2022-01-27 PROCEDURE — 83036 HEMOGLOBIN GLYCOSYLATED A1C: CPT | Performed by: INTERNAL MEDICINE

## 2022-01-27 PROCEDURE — 82948 REAGENT STRIP/BLOOD GLUCOSE: CPT | Performed by: INTERNAL MEDICINE

## 2022-01-27 RX ORDER — CLOTRIMAZOLE 1 %
CREAM (GRAM) TOPICAL 2 TIMES DAILY
Qty: 30 G | Refills: 0 | Status: CANCELLED | OUTPATIENT
Start: 2022-01-27

## 2022-01-27 NOTE — PROGRESS NOTES
[address] [employer] [address] [place] [address]    NAME: Ariadne Lynchburg  AGE: 73 y o  SEX: male    DATE OF ENCOUNTER: 1/27/2022    Assessment and Plan     1  Type 2 diabetes mellitus with hyperglycemia, without long-term current use of insulin (HCC)    Diet and exercise controlled   Denies polyuria and polydipsia   Repeat A1C 6 7 from 7 3   Endorses progressive blurry vision  Diagnosed with cataracts a year ago by ophth but did not see anyone afterwards  Continue Current Regimen    -IRIS exam  - POCT hemoglobin A1c  - POCT blood glucose    2  Cataracts B/L / Blurry Vision     See 1    No orders of the defined types were placed in this encounter  Chief Complaint     No chief complaint on file  History of Present Illness     HPI    Pt is a 72 y/o M w/ PMHx of T2DM A1C 7 3, HTN, HLD, Hepatic Steatosis, 1st degree AV block who presents for blurry vision  Pt was diagnosed with b/l cataracts last year and now does have progressive blurry vision  He will be moving to [facility] until end of the year  Recommended ophth follow up there as the condition is progressive  He denies any ocular pain, chest pain, SOB, abdominal pain, nausea, vomiting, fever or chills  Follows a good diet  Does not smoke or drink alcohol  BMI Counseling: Body mass index is 29 54 kg/m²  The BMI is above normal  Nutrition recommendations include reducing portion sizes, decreasing overall calorie intake and 3-5 servings of fruits/vegetables daily  Exercise recommendations include vigorous aerobic physical activity for 75 minutes/week  The patient does not have a history of falls  I did complete a risk assessment for falls  A plan of care for falls was documented        The following portions of the patient's history were reviewed and updated as appropriate: allergies, current medications, past family history, past medical history, past social history, past surgical history and problem list     Review of Systems     Review of Systems   Constitutional: Negative for chills, diaphoresis, fatigue and unexpected weight change  HENT: Negative for ear discharge  Eyes: Negative for pain, redness and visual disturbance  Respiratory: Negative for cough, choking, shortness of breath and wheezing  Gastrointestinal: Negative for constipation, nausea and vomiting  Endocrine: Negative for polydipsia and polyuria  Genitourinary: Negative for decreased urine volume and dysuria  Active Problem List     Patient Active Problem List   Diagnosis    Prostate cancer screening    Hepatic steatosis    Essential hypertension    First degree AV block    Tinea pedis of both feet    Sigmoid diverticulosis    Overweight    Tremor of both hands    Type 2 diabetes mellitus with hyperglycemia ([facility] [address] )    History of COVID-19    Pure hypercholesterolemia       Objective     There were no vitals taken for this visit  Physical Exam  Constitutional:       General: He is not in acute distress  Appearance: He is not ill-appearing, toxic-appearing or diaphoretic  HENT:      Head: Normocephalic and atraumatic  Nose: No congestion or rhinorrhea  Cardiovascular:      Rate and Rhythm: Normal rate and regular rhythm  Pulses: Normal pulses  Heart sounds: Normal heart sounds  No murmur heard  No gallop  Pulmonary:      Effort: Pulmonary effort is normal  No respiratory distress  Breath sounds: Normal breath sounds  No stridor  No wheezing, rhonchi or rales  Chest:      Chest wall: No tenderness  Abdominal:      General: Bowel sounds are normal  There is no distension  Palpations: Abdomen is soft  There is no mass  Tenderness: There is no abdominal tenderness  There is no right CVA tenderness, left CVA tenderness, guarding or rebound  Hernia: No hernia is present  Musculoskeletal:      Right lower leg: No edema        Left lower leg: No edema  Neurological:      General: No focal deficit present  Mental Status: He is oriented to person, place, and time     Psychiatric:         Mood and Affect: Mood normal          Current Medications     Current Outpatient Medications:     amLODIPine (NORVASC) 5 mg tablet, Take 1 tablet (5 mg total) by mouth daily, Disp: 90 tablet, Rfl: 0    ascorbic acid (VITAMIN C) 1000 MG tablet, Take 1 tablet (1,000 mg total) by mouth every 12 (twelve) hours for 14 doses (Patient not taking: Reported on 3/25/2021), Disp: 6 tablet, Rfl: 0    Blood Pressure Monitoring (BLOOD PRESSURE MONITOR/M CUFF) MISC, by Does not apply route daily, Disp: 1 each, Rfl: 0    fluticasone (FLONASE) 50 mcg/act nasal spray, 2 sprays into each nostril daily, Disp: 1 Bottle, Rfl: 0    methocarbamol (ROBAXIN) 500 mg tablet, Take 1 tablet (500 mg total) by mouth daily at bedtime For neck pain, Disp: 30 tablet, Rfl: 0    multivitamin-minerals (CENTRUM ADULTS) tablet, Take 1 tablet by mouth daily, Disp: 30 tablet, Rfl: 0    zinc sulfate (ZINCATE) 220 mg capsule, Take 1 capsule (220 mg total) by mouth daily for 7 days (Patient not taking: Reported on 3/25/2021), Disp: 7 capsule, Rfl: 0    Health Maintenance     Health Maintenance   Topic Date Due    Medicare Annual Wellness Visit (AWV)  Never done    COVID-19 Vaccine (1) Never done    Pneumococcal Vaccine: 65+ Years (1 of 2 - PPSV23) Never done    DTaP,Tdap,and Td Vaccines (1 - Tdap) Never done    Fall Risk  06/08/2021    BMI: Followup Plan  06/08/2021    Diabetic Foot Exam  06/08/2021    Influenza Vaccine (1) Never done    HEMOGLOBIN A1C  09/04/2021    DM Eye Exam  11/19/2021    URINE MICROALBUMIN  03/04/2022    Depression Screening  03/11/2022    BMI: Adult  03/25/2022    Colorectal Cancer Screening  01/09/2030    Hepatitis C Screening  Completed    HIB Vaccine  Aged Out    Hepatitis B Vaccine  Aged Out    IPV Vaccine  Aged Out    Hepatitis A Vaccine  Aged C/ [address] Meningococcal ACWY Vaccine  Aged Out    HPV Vaccine  Aged Out     Immunization History   Administered Date(s) Administered    Hep B, adult 01/21/2020, 02/25/2020       Micih Arroyo MD  PGY 2

## 2022-12-06 ENCOUNTER — OFFICE VISIT (OUTPATIENT)
Dept: FAMILY MEDICINE CLINIC | Facility: CLINIC | Age: 72
End: 2022-12-06

## 2022-12-06 VITALS
RESPIRATION RATE: 16 BRPM | TEMPERATURE: 97.4 F | WEIGHT: 170 LBS | DIASTOLIC BLOOD PRESSURE: 80 MMHG | BODY MASS INDEX: 29.02 KG/M2 | SYSTOLIC BLOOD PRESSURE: 140 MMHG | HEART RATE: 56 BPM | HEIGHT: 64 IN

## 2022-12-06 DIAGNOSIS — I10 ESSENTIAL HYPERTENSION: Chronic | ICD-10-CM

## 2022-12-06 DIAGNOSIS — R22.42 LOCALIZED SWELLING OF LEFT LOWER LEG: ICD-10-CM

## 2022-12-06 DIAGNOSIS — I73.9 PERIPHERAL VASCULAR DISEASE (HCC): ICD-10-CM

## 2022-12-06 DIAGNOSIS — E66.3 OVERWEIGHT: Chronic | ICD-10-CM

## 2022-12-06 DIAGNOSIS — E11.9 TYPE 2 DIABETES, DIET CONTROLLED (HCC): ICD-10-CM

## 2022-12-06 DIAGNOSIS — H26.9 CATARACT OF BOTH EYES, UNSPECIFIED CATARACT TYPE: ICD-10-CM

## 2022-12-06 DIAGNOSIS — K76.0 HEPATIC STEATOSIS: Primary | Chronic | ICD-10-CM

## 2022-12-06 DIAGNOSIS — I44.0 FIRST DEGREE AV BLOCK: Chronic | ICD-10-CM

## 2022-12-06 DIAGNOSIS — Z12.5 PROSTATE CANCER SCREENING: ICD-10-CM

## 2022-12-06 DIAGNOSIS — E78.00 PURE HYPERCHOLESTEROLEMIA: ICD-10-CM

## 2022-12-06 DIAGNOSIS — Z12.11 COLON CANCER SCREENING: ICD-10-CM

## 2022-12-06 DIAGNOSIS — E11.65 TYPE 2 DIABETES MELLITUS WITH HYPERGLYCEMIA, WITHOUT LONG-TERM CURRENT USE OF INSULIN (HCC): ICD-10-CM

## 2022-12-06 DIAGNOSIS — Z23 ENCOUNTER FOR IMMUNIZATION: ICD-10-CM

## 2022-12-06 RX ORDER — AMLODIPINE BESYLATE 5 MG/1
5 TABLET ORAL DAILY
Qty: 90 TABLET | Refills: 0 | Status: SHIPPED | OUTPATIENT
Start: 2022-12-06

## 2022-12-06 NOTE — ASSESSMENT & PLAN NOTE
Chronic left lower extremity swelling  Compression stockings recommended  Arterial Dopplers ordered

## 2022-12-06 NOTE — ASSESSMENT & PLAN NOTE
Lab Results   Component Value Date    HGBA1C 6 7 (A) 01/27/2022   Patient with a history of diet-controlled diabetes  He is due for hemoglobin A1c and this has been ordered  He has never been on medication  His last hemoglobin A1c was 6 7  Diabetic foot exam was performed in the office today  He is up-to-date with diabetic eye exams

## 2022-12-06 NOTE — ASSESSMENT & PLAN NOTE
Condition stable. Normal growth and development, has been off of supplemental oxygen more than 6 months.     Plan:  Continue to monitor  Return to clinic in 6 months Elevated cholesterol levels in the past   He is not on a statin  Lipid panel ordered  Low-fat low-cholesterol diet recommended  Weight loss recommended

## 2022-12-06 NOTE — PROGRESS NOTES
INTERNAL MEDICINE INITIAL OFFICE VISIT    [facility] - [facility]    NAME: Christopher Patel  AGE: 81 y o  SEX: male  : 1950     DATE: 2022     Assessment and Plan:     Problem List Items Addressed This Visit        Digestive    Hepatic steatosis - Primary (Chronic)     Ultrasound of the liver from 2019 shows hepatic steatosis  Ultrasound of the liver ordered for surveillance  Relevant Orders    US abdomen complete       Endocrine    Type 2 diabetes, diet controlled ([address] )       Lab Results   Component Value Date    HGBA1C 6 7 (A) 2022   Patient with a history of diet-controlled diabetes  He is due for hemoglobin A1c and this has been ordered  He has never been on medication  His last hemoglobin A1c was 6 7  Diabetic foot exam was performed in the office today  He is up-to-date with diabetic eye exams  Cardiovascular and Mediastinum    Essential hypertension (Chronic)     Patient with a longstanding history of hypertension  He has been prescribed amlodipine in the past however has not taking this medication  His blood pressure in the office today is 140/80  Information regarding the DASH diet was provided to the patient  He is agreeable to going on the amlodipine today  This was sent to his pharmacy  Side effects discussed  I will see him back in 1 month to review blood work and to recheck his blood pressure  Relevant Medications    amLODIPine (NORVASC) 5 mg tablet    Other Relevant Orders    Basic metabolic panel    CBC and differential    First degree AV block (Chronic)    Peripheral vascular disease (HCC)     Chronic left lower extremity swelling  Compression stockings recommended  Arterial Dopplers ordered  Other    Overweight (Chronic)     Lifestyle modifications recommended  Weight loss recommended           Prostate cancer screening    Relevant Orders    PSA, Total Screen    Pure hypercholesterolemia Elevated cholesterol levels in the past   He is not on a statin  Lipid panel ordered  Low-fat low-cholesterol diet recommended  Weight loss recommended  Relevant Orders    Lipid Panel with Direct LDL reflex    Cataracts, bilateral     Last eye exam in January 2022  The patient will be due for an eye exam next month  He reports his vision in his right eye is more blurry  Other Visit Diagnoses     Encounter for immunization        Type 2 diabetes mellitus with hyperglycemia, without long-term current use of insulin ([address] )        Relevant Orders    Microalbumin / creatinine urine ratio ([facility])    HEMOGLOBIN A1C W/ EAG ESTIMATION    Basic metabolic panel    CBC and differential    Colon cancer screening        Relevant Orders    Cologuard    Localized swelling of left lower leg        Relevant Orders    VAS lower limb arterial duplex, limited/unilateral          Return in about 1 month (around 1/6/2023) for [employer] [facility] Medicare wellness visit  Chief Complaint:     Chief Complaint   Patient presents with   • New Patient Visit     Just follow up       History of Present Illness: The patient presents to the office today to establish care  The patient's past medical history was updated  He was last seen by a PCP in January 2022  He does have a diagnosis of diet-controlled diabetes  Diabetic foot exam was performed today and was normal   He is up-to-date with his diabetic vision exams  He is due for blood work and this has been ordered  His blood pressure continues to be elevated and he is willing to start a blood pressure medication today  He has chronic left lower extremity swelling and arterial Dopplers were ordered  Compression stockings recommended  Low-salt diet recommended  The patient declined a flu vaccine and a pneumonia vaccine in the office today  He has not received any COVID vaccines  He has had no recent falls    I will see him back in 1 month to review his blood work and to perform his Medicare annual wellness exam     The following portions of the patient's history were reviewed and updated as appropriate: allergies, current medications, past family history, past medical history, past social history, past surgical history and problem list      Review of Systems:     Review of Systems   Constitutional: Negative  Negative for fatigue  HENT: Negative  Negative for congestion, postnasal drip, rhinorrhea and trouble swallowing  Eyes: Negative  Negative for visual disturbance  Respiratory: Negative  Negative for choking and shortness of breath  Cardiovascular: Positive for leg swelling (right chronic)  Negative for chest pain  Gastrointestinal: Negative  Endocrine: Negative  Genitourinary: Negative  Musculoskeletal: Negative  Negative for arthralgias, back pain, myalgias and neck pain  Skin: Negative  Neurological: Negative for dizziness and headaches  Psychiatric/Behavioral: Negative  Past Medical History:     Past Medical History:   Diagnosis Date   • Diabetes mellitus ([facility] )    • Hypertension    • Prediabetes         Past Surgical History:   History reviewed  No pertinent surgical history  Social History:     Social History     Socioeconomic History   • Marital status: /Civil Union     Spouse name: None   • Number of children: None   • Years of education: None   • Highest education level: None   Occupational History   • None   Tobacco Use   • Smoking status: Never   • Smokeless tobacco: Never   Vaping Use   • Vaping Use: Never used   Substance and Sexual Activity   • Alcohol use:  Yes     Alcohol/week: 1 0 standard drink     Types: 1 Standard drinks or equivalent per week   • Drug use: No   • Sexual activity: Never   Other Topics Concern   • None   Social History Narrative   • None     Social Determinants of Health     Financial Resource Strain: Not on file   Food Insecurity: Not on file   Transportation Needs: Not on file   Physical Activity: Not on file   Stress: Not on file   Social Connections: Not on file   Intimate Partner Violence: Not on file   Housing Stability: Not on file         Family History:     Family History   Problem Relation Age of Onset   • No Known Problems Mother    • No Known Problems Father    • Diabetes Sister    • Diabetes Brother    • No Known Problems Son    • No Known Problems Daughter    • No Known Problems Sister    • No Known Problems Sister    • No Known Problems Sister    • No Known Problems Sister    • No Known Problems Brother    • No Known Problems Brother    • No Known Problems Brother    • No Known Problems Son    • No Known Problems Son    • No Known Problems Daughter         Current Medications:     Current Outpatient Medications:   •  amLODIPine (NORVASC) 5 mg tablet, Take 1 tablet (5 mg total) by mouth daily, Disp: 90 tablet, Rfl: 0     Allergies:   No Known Allergies     Physical Exam:     /80   Pulse 56   Temp (!) 97 4 °F (36 3 °C) (Tympanic)   Resp 16   Ht 5' 4" (1 626 m)   Wt 77 1 kg (170 lb)   BMI 29 18 kg/m²     Physical Exam  Vitals reviewed  Constitutional:       General: He is not in acute distress  Appearance: Normal appearance  He is well-developed and well-nourished  He is obese  HENT:      Head: Normocephalic and atraumatic  Right Ear: Tympanic membrane, ear canal and external ear normal       Left Ear: Tympanic membrane, ear canal and external ear normal       Nose: Nose normal       Mouth/Throat:      Mouth: Oropharynx is clear and moist       Pharynx: No oropharyngeal exudate  Eyes:      General:         Right eye: No discharge  Left eye: No discharge  Extraocular Movements: EOM normal       Conjunctiva/sclera: Conjunctivae normal       Pupils: Pupils are equal, round, and reactive to light  Neck:      Thyroid: No thyromegaly  Vascular: No JVD  Trachea: No tracheal deviation     Cardiovascular:      Rate and Rhythm: Normal rate and regular rhythm  Pulses: Normal pulses and intact distal pulses  no weak pulses          Dorsalis pedis pulses are 2+ on the right side and 2+ on the left side  Posterior tibial pulses are 2+ on the right side and 2+ on the left side  Heart sounds: Normal heart sounds  No murmur heard  Pulmonary:      Effort: Pulmonary effort is normal  No respiratory distress  Breath sounds: Normal breath sounds  No wheezing  Abdominal:      General: Bowel sounds are normal       Palpations: Abdomen is soft  Tenderness: There is no abdominal tenderness  Musculoskeletal:         General: No edema  Normal range of motion  Cervical back: Normal range of motion and neck supple  Feet:      Right foot:      Skin integrity: Dry skin present  No ulcer, skin breakdown, erythema, warmth or callus  Left foot:      Skin integrity: Dry skin present  No ulcer, skin breakdown, erythema, warmth or callus  Lymphadenopathy:      Cervical: No cervical adenopathy  Skin:     General: Skin is warm and dry  Capillary Refill: Capillary refill takes less than 2 seconds  Neurological:      Mental Status: He is alert and oriented to person, place, and time  Psychiatric:         Mood and Affect: Mood and affect and mood normal          Behavior: Behavior normal          Thought Content: Thought content normal          Judgment: Judgment normal        Diabetic Foot Exam    Patient's shoes and socks removed  Right Foot/Ankle   Right Foot Inspection  Skin Exam: skin normal, skin intact and dry skin  No warmth, no callus, no erythema, no maceration, no abnormal color, no pre-ulcer, no ulcer and no callus  Toe Exam: ROM and strength within normal limits  Sensory   Vibration: intact  Proprioception: intact  Monofilament testing: intact    Vascular  Capillary refills: < 3 seconds  The right DP pulse is 2+  The right PT pulse is 2+       Right Toe  - Comprehensive Exam  Ecchymosis: none  Arch: normal  Hammertoes: absent  Claw Toes: absent  Swelling: none   Tenderness: none         Left Foot/Ankle  Left Foot Inspection  Skin Exam: skin normal, skin intact and dry skin  No warmth, no erythema, no maceration, normal color, no pre-ulcer, no ulcer and no callus  Toe Exam: ROM and strength within normal limits  Sensory   Vibration: intact  Proprioception: intact  Monofilament testing: intact    Vascular  Capillary refills: < 3 seconds  The left DP pulse is 2+  The left PT pulse is 2+  Left Toe  - Comprehensive Exam  Ecchymosis: none  Arch: normal  Hammertoes: absent  Claw toes: absent  Swelling: none   Tenderness: none           Assign Risk Category  No deformity present  No loss of protective sensation  No weak pulses  Risk: 0     Data:     Laboratory Results: I have personally reviewed the pertinent laboratory results/reports   Radiology/Other Diagnostic Testing Results: I have personally reviewed pertinent reports  Patient Instructions   Cholesterol and Your Health   AMBULATORY CARE:   Cholesterol  is a waxy, fat-like substance  Your body uses cholesterol to make hormones and new cells, and to protect nerves  Cholesterol is made by your body  It also comes from certain foods you eat, such as meat and dairy products  Your healthcare provider can help you set goals for your cholesterol levels  He or she can help you create a plan to meet your goals  Cholesterol level goals: Your cholesterol level goals depend on your risk for heart disease, your age, and your other health conditions  The following are general guidelines: Total cholesterol  includes low-density lipoprotein (LDL), high-density lipoprotein (HDL), and triglyceride levels  The total cholesterol level should be lower than 200 mg/dL and is best at about 150 mg/dL  LDL cholesterol  is called bad cholesterol  because it forms plaque in your arteries   As plaque builds up, your arteries become narrow, and less blood flows through  When plaque decreases blood flow to your heart, you may have chest pain  If plaque completely blocks an artery that brings blood to your heart, you may have a heart attack  Plaque can break off and form blood clots  Blood clots may block arteries in your brain and cause a stroke  The level should be less than 130 mg/dL and is best at about 100 mg/dL  HDL cholesterol  is called good cholesterol  because it helps remove LDL cholesterol from your arteries  It does this by attaching to LDL cholesterol and carrying it to your liver  Your liver breaks down LDL cholesterol so your body can get rid of it  High levels of HDL cholesterol can help prevent a heart attack and stroke  Low levels of HDL cholesterol can increase your risk for heart disease, heart attack, and stroke  The level should be 60 mg/dL or higher  Triglycerides  are a type of fat that store energy from foods you eat  High levels of triglycerides also cause plaque buildup  This can increase your risk for a heart attack or stroke  If your triglyceride level is high, your LDL cholesterol level may also be high  The level should be less than 150 mg/dL  Any of the following can increase your risk for high cholesterol:   Smoking cigarettes    Being overweight or obese, or not getting enough exercise    Drinking large amounts of alcohol    A medical condition such as hypertension (high blood pressure) or diabetes    Certain genes passed from your parents to you    Age older than 65 years    What you need to know about having your cholesterol levels checked: Adults 21to 39years of age should have their cholesterol levels checked every 4 to 6 years  Adults 45 years or older should have their cholesterol checked every 1 to 2 years  You may need your cholesterol checked more often, or at a younger age, if you have risk factors for heart disease   You may also need to have your cholesterol checked more often if you have other health conditions, such as diabetes  Blood tests are used to check cholesterol levels  Blood tests measure your levels of triglycerides, LDL cholesterol, and HDL cholesterol  How healthy fats affect your cholesterol levels:  Healthy fats, also called unsaturated fats, help lower LDL cholesterol and triglyceride levels  Healthy fats include the following:  Monounsaturated fats  are found in foods such as olive oil, canola oil, avocado, nuts, and olives  Polyunsaturated fats,  such as omega 3 fats, are found in fish, such as salmon, trout, and tuna  They can also be found in plant foods such as flaxseed, walnuts, and soybeans  How unhealthy fats affect your cholesterol levels:  Unhealthy fats increase LDL cholesterol and triglyceride levels  They are found in foods high in cholesterol, saturated fat, and trans fat:  Cholesterol  is found in eggs, dairy, and meat  Saturated fat  is found in butter, cheese, ice cream, whole milk, and coconut oil  Saturated fat is also found in meat, such as sausage, hot dogs, and bologna  Trans fat  is found in liquid oils and is used in fried and baked foods  Foods that contain trans fats include chips, crackers, muffins, sweet rolls, microwave popcorn, and cookies  Treatment  for high cholesterol will also decrease your risk of heart disease, heart attack, and stroke  Treatment may include any of the following:  Lifestyle changes  may include food, exercise, weight loss, and quitting smoking  You may also need to decrease the amount of alcohol you drink  Your healthcare provider will want you to start with lifestyle changes  Other treatment may be added if lifestyle changes are not enough  Your healthcare provider may recommend you work with a team to manage hyperlipidemia   The team may include medical experts such as a dietitian, an exercise or physical therapist, and a behavior therapist  Your family members may be included in helping you create lifestyle changes  Medicines  may be given to lower your LDL cholesterol, triglyceride levels, or total cholesterol level  You may need medicines to lower your cholesterol if any of the following is true:    You have a history of stroke, TIA, unstable angina, or a heart attack  Your LDL cholesterol level is 190 mg/dL or higher  You are age 36 to 76 years, have diabetes or heart disease risk factors, and your LDL cholesterol is 70 mg/dL or higher  Supplements  include fish oil, red yeast rice, and garlic  Fish oil may help lower your triglyceride and LDL cholesterol levels  It may also increase your HDL cholesterol level  Red yeast rice may help decrease your total cholesterol level and LDL cholesterol level  Garlic may help lower your total cholesterol level  Do not take any supplements without talking to your healthcare provider  Food changes you can make to lower your cholesterol levels:  A dietitian can help you create a healthy eating plan  He or she can show you how to read food labels and choose foods low in saturated fat, trans fats, and cholesterol  Decrease the total amount of fat you eat  Choose lean meats, fat-free or 1% fat milk, and low-fat dairy products, such as yogurt and cheese  Try to limit or avoid red meats  Limit or do not eat fried foods or baked goods, such as cookies  Replace unhealthy fats with healthy fats  Cook foods in olive oil or canola oil  Choose soft margarines that are low in saturated fat and trans fat  Seeds, nuts, and avocados are other examples of healthy fats  Eat foods with omega-3 fats  Examples include salmon, tuna, mackerel, walnuts, and flaxseed  Eat fish 2 times per week  Pregnant women should not eat fish that have high levels of mercury, such as shark, swordfish, and samantha mackerel  Increase the amount of high-fiber foods you eat  High-fiber foods can help lower your LDL cholesterol  Aim to get between 20 and 30 grams of fiber each day   Fruits and vegetables are high in fiber  Eat at least 5 servings each day  Other high-fiber foods are whole-grain or whole-wheat breads, pastas, or cereals, and brown rice  Eat 3 ounces of whole-grain foods each day  Increase fiber slowly  You may have abdominal discomfort, bloating, and gas if you add fiber to your diet too quickly  Eat healthy protein foods  Examples include low-fat dairy products, skinless chicken and turkey, fish, and nuts  Limit foods and drinks that are high in sugar  Your dietitian or healthcare provider can help you create daily limits for high-sugar foods and drinks  The limit may be lower if you have diabetes or another health condition  Limits can also help you lose weight if needed  Lifestyle changes you can make to lower your cholesterol levels:   Maintain a healthy weight  Ask your healthcare provider what a healthy weight is for you  Ask him or her to help you create a weight loss plan if needed  Weight loss can decrease your total cholesterol and triglyceride levels  Weight loss may also help keep your blood pressure at a healthy level  Be physically active throughout the day  Physical activity, such as exercise, can help lower your total cholesterol level and maintain a healthy weight  Physical activity can also help increase your HDL cholesterol level  Work with your healthcare provider to create an program that is right for you  Get at least 30 to 40 minutes of moderate physical activity most days of the week  Examples of exercise include brisk walking, swimming, or biking  Also include strength training at least 2 times each week  Your healthcare providers can help you create a physical activity plan  Do not smoke  Nicotine and other chemicals in cigarettes and cigars can raise your cholesterol levels  Ask your healthcare provider for information if you currently smoke and need help to quit  E-cigarettes or smokeless tobacco still contain nicotine   Talk to your healthcare provider before you use these products  Limit or do not drink alcohol  Alcohol can increase your triglyceride levels  Ask your healthcare provider before you drink alcohol  Ask how much is okay for you to drink in 24 hours or 1 week  Follow up with your doctor as directed:  Write down your questions so you remember to ask them during your visits  © Copyright [address] 2022 Information is for End User's use only and may not be sold, redistributed or otherwise used for commercial purposes  All illustrations and images included in CareNotes® are the copyrighted property of [employer]  or [facility]  The above information is an  only  It is not intended as medical advice for individual conditions or treatments  Talk to your doctor, nurse or pharmacist before following any medical regimen to see if it is safe and effective for you  Good fat  Good fats come mainly from vegetables, nuts, seeds, and fish  They differ from saturated fats by having fewer hydrogen atoms bonded to their carbon chains  Healthy fats are liquid at room temperature, not solid  There are two broad categories of beneficial fats: monounsaturated and polyunsaturated fats  Monounsaturated fats  When you dip your bread in olive oil at an [place], you're getting mostly monounsaturated fat  Monounsaturated fats have a single carbon-to-carbon double bond  The result is that it has two fewer hydrogen atoms than a saturated fat and a bend at the double bond  This structure keeps monounsaturated fats liquid at room temperature  Good sources of monounsaturated fats are olive oil, peanut oil, canola oil, avocados, and most nuts, as well as high-oleic safflower and sunflower oils  The discovery that monounsaturated fat could be healthful came from the [place] Study during the 1960s   It revealed that people in [place] and other parts of the [facility] region enjoyed a low rate of heart disease despite a high-fat diet  The main fat in their diet, though, was not the saturated animal fat common in countries with higher rates of heart disease  It was olive oil, which contains mainly monounsaturated fat  This finding produced a surge of interest in olive oil and the "Mediterranean diet," a style of eating regarded as a healthful choice today  Although there's no recommended daily intake of monounsaturated fats, the [employer] recommends using them as much as possible along with polyunsaturated fats to replace saturated and trans fats  Polyunsaturated fats  When you pour liquid cooking oil into a pan, there's a good chance you're using polyunsaturated fat  Corn oil, sunflower oil, and safflower oil are common examples  Polyunsaturated fats are essential fats  That means they're required for normal body functions but your body can't make them  So you must get them from food  Polyunsaturated fats are used to build cell membranes and the covering of nerves  They are needed for blood clotting, muscle movement, and inflammation  A polyunsaturated fat has two or more double bonds in its carbon chain  There are two main types of polyunsaturated fats: omega-3 fatty acids and omega-6 fatty acids  The numbers refer to the distance between the beginning of the carbon chain and the first double bond  Both types offer health benefits  Eating polyunsaturated fats in place of saturated fats or highly refined carbohydrates reduces harmful LDL cholesterol and improves the cholesterol profile  It also lowers triglycerides  Good sources of omega-3 fatty acids include fatty fish such as salmon, mackerel, and sardines, flaxseeds, walnuts, canola oil, and unhydrogenated soybean oil  Omega-3 fatty acids may help prevent and even treat heart disease and stroke   In addition to reducing blood pressure, raising HDL, and lowering triglycerides, polyunsaturated fats may help prevent lethal heart rhythms from arising  Evidence also suggests they may help reduce the need for corticosteroid medications in people with rheumatoid arthritis  Studies linking omega-3s to a wide range of other health improvements, including reducing risk of dementia, are inconclusive, and some of them have major flaws, according to a systematic review of the evidence by the Agency for Healthcare Research and Quality  Omega-6 fatty acids have also been linked to protection against heart disease  Foods rich in linoleic acid and other omega-6 fatty acids include vegetable oils such as safflower, soybean, sunflower, walnut, and corn oils  DASH Eating Plan   WHAT YOU NEED TO KNOW:   The DASH (Dietary Approaches to Stop Hypertension) Eating Plan is designed to help prevent or lower high blood pressure  It can also help to lower LDL (bad) cholesterol and decrease your risk for heart disease  The plan is low in sodium, sugar, unhealthy fats, and total fat  It is high in potassium, calcium, magnesium, and fiber  These nutrients are added when you eat more fruits, vegetables, and whole grains  With the DASH eating plan, you need to eat a certain number of servings from each food group  This will help you get enough of certain nutrients and limit others  The amount of servings you should eat depends on how many calories you need  Your dietitian can help you create meal plans with the right number of servings for each food group  DISCHARGE INSTRUCTIONS:   What you need to know about sodium:  Your dietitian will tell you how much sodium is safe for you to have each day  People with high blood pressure should have no more than 1,500 to 2,300 mg of sodium in a day  A teaspoon (tsp) of salt has 2,300 mg of sodium  This may seem like a difficult goal, but small changes to the foods you eat can make a big difference  Your healthcare provider or dietitian can help you create a meal plan that follows your sodium limit    Read food labels  Food labels can help you choose foods that are low in sodium  The amount of sodium is listed in milligrams (mg)  The % Daily Value (DV) column tells you how much of your daily needs are met by 1 serving of the food for each nutrient listed  Choose foods that have less than 5% of the DV of sodium  These foods are considered low in sodium  Foods that have 20% or more of the DV of sodium are considered high in sodium  Avoid foods that have more than 300 mg of sodium in each serving  Choose foods that say low-sodium, reduced-sodium, or no salt added on the food label  Limit added salt  Do not salt food at the table if you add salt when you cook  Use herbs and spices, such as onions, garlic, and salt-free seasonings to add flavor  Try lemon or lime juice or vinegar to add a tart flavor  Use hot peppers or a small amount of hot pepper sauce to add a spicy flavor  Limit foods high in added salt, such as the following:    Seasonings made with salt, such as garlic salt, celery salt, onion salt, seasoned salt, meat tenderizers, and monosodium glutamate (MSG)    Miso soup and canned or dried soup mixes    Regular soy sauce, barbecue sauce, teriyaki sauce, steak sauce, Worcestershire sauce, and most flavored vinegars    Snack foods, such as salted chips, popcorn, pretzels, pork rinds, salted crackers, and salted nuts    Frozen foods, such as dinners, entrees, vegetables with sauces, and breaded meats    Ask about salt substitutes  Ask your healthcare provider if you may use salt substitutes  Some salt substitutes have ingredients that can be harmful if you have certain health conditions  Choose foods carefully at restaurants  Meals from restaurants, especially fast food restaurants, are often high in sodium  Some restaurants have nutrition information that tells you the amount of sodium in their foods  Ask to have your food prepared with less, or no salt      What you need to know about fats:  Healthy fats include unsaturated fats and omega-3 fatty acids  Unhealthy fats include saturated fats and trans fats  Include healthy fats, such as the following:      Cooking oils, such as soybean, canola, olive, or sunflower    Fatty fish, such as salmon, tuna, mackerel, or sardines    Flaxseed oil or ground flaxseed    ½ cup of cooked beans, such as black beans, kidney beans, or viera beans    1½ ounces of low-sodium nuts, such as almonds or walnuts    Low-sugar, low-sodium peanut butter    Seeds such as ora seeds or sunflower seeds       Limit or do not have unhealthy fats, such as the following:      Foods that contain fat from animals, such as fatty meats, whole milk, butter, and cream    Shortening, stick margarine, palm oil, and coconut oil    Full-fat or creamy salad dressing    Creamy soup    Crackers, chips, and baked goods made with margarine or shortening    Foods that are fried in unhealthy fats    Gravy and sauces, such as Hans or cheese sauces    What you need to know about carbohydrates (carbs): All carbs break down into sugar  Complex carbs contain more fiber than simple carbs  This means complex carbs go into the bloodstream more slowly and cause less of a blood sugar spike  Try to include more complex carbs and fewer simple carbs    Include complex carbs, such as the followin slice of whole-grain bread    1 ounce of dry cereal that does not contain added sugar    ½ cup of cooked oatmeal    2 ounces of cooked whole-grain pasta    ½ cup of cooked brown rice    Limit or do not have simple carbs, such as the following:      [employer], such as doughnuts, pastries, and cookies    Mixes for cornbread and biscuits    White rice and pasta mixes, such as boxed macaroni and cheese    Instant and cold cereals that contain sugar    Jelly, jam, and ice cream that contain sugar    Condiments such as ketchup    Drinks high in sugar, such as soft drinks, lemonade, and fruit juice    What you need to know about vegetables and fruits:  Vegetables and fruits can be fresh, frozen, or canned  If possible, try to choose low-sodium canned options  Include a variety of vegetables and fruits, such as the followin medium apple, pear, or peach (about ½ cup chopped)    ½ small banana    ½ cup berries, such as blueberries, strawberries, or blackberries    1 cup of raw leafy greens, such as lettuce, spinach, kale, or maye greens    ½ cup of frozen or canned (no added salt) vegetables, such as green beans    ½ cup of fresh, frozen, or canned fruit (canned in light syrup or fruit juice)    ½ cup of vegetable or fruit juice    Limit or do not have vegetables and fruits made in the following ways:      Frozen fruit such as cherries that have added sugar    Fruit in cream or butter sauce    Canned vegetables that are high in sodium    Sauerkraut, pickled vegetables, and other foods prepared in brine    Fried vegetables or vegetables in butter or high-fat sauces    What you need to know about protein foods:    Include lean or low-fat protein foods, such as the following:      Poultry (chicken, turkey) with no skin    Fish (especially fatty fish, such as salmon, fresh tuna, or mackerel)    Lean beef and pork (loin, round, extra lean hamburger)    Egg whites and egg substitutes    1 cup of nonfat (skim) or 1% milk    1½ ounces of fat-free or low-fat cheese    6 ounces of nonfat or low-fat yogurt    Limit or do not have high-fat protein foods, such as the following:      Smoked or cured meat, such as corned beef, becerril, ham, hot dogs, and sausage    Canned beans and canned meats or spreads, such as potted meats, sardines, anchovies, and imitation seafood    Deli or lunch meats, such as bologna, ham, turkey, and roast beef    High-fat meat (T-bone steak, regular hamburger, and ribs)    Whole eggs and egg yolks    Whole milk, 2% milk, and cream    Regular cheese and processed cheese    Other guidelines to follow:   Maintain a healthy weight  Your risk for heart disease is higher if you are overweight  Your healthcare provider may suggest that you lose weight if you are overweight  You can lose weight by eating fewer calories and foods that have added sugars and fat  The DASH meal plan can help you do this  Decrease calories by eating smaller portions at each meal and fewer snacks  Ask your healthcare provider for more information about how to lose weight  Exercise regularly  Regular exercise can help you reach or maintain a healthy weight  Regular exercise can also help decrease your blood pressure and improve your cholesterol levels  Get 30 minutes or more of moderate exercise each day of the week  To lose weight, get at least 60 minutes of exercise  Talk to your healthcare provider about the best exercise program for you  Limit alcohol  Women should limit alcohol to 1 drink a day  Men should limit alcohol to 2 drinks a day  A drink of alcohol is 12 ounces of beer, 5 ounces of wine, or 1½ ounces of liquor  For more information:   [facility] and [address]  [address] [address]  [place] [address]  Phone: [phone]  Web Address: [facility] no    © [facility] 2022 Information is for End User's use only and may not be sold, redistributed or otherwise used for commercial purposes  All illustrations and images included in CareNotes® are the copyrighted property of A D A M , Inc  or [facility]   The above information is an  only  It is not intended as medical advice for individual conditions or treatments  Talk to your doctor, nurse or pharmacist before following any medical regimen to see if it is safe and effective for you  Hypertension   WHAT YOU NEED TO KNOW:   Hypertension is high blood pressure  Your blood pressure is the force of your blood moving against the walls of your arteries   Hypertension causes your blood pressure to get so high that your heart has to work much harder than normal  This can damage your heart  The cause of hypertension may not be known  This is called essential or primary hypertension  Hypertension caused by another medical condition, such as kidney disease, is called secondary hypertension  DISCHARGE INSTRUCTIONS:   Call your local emergency number (911 in the [facility]) or have someone call if:   You have chest pain  You have any of the following signs of a heart attack:      Squeezing, pressure, or pain in your chest    You may  also have any of the following:     Discomfort or pain in your back, neck, jaw, stomach, or arm    Shortness of breath    Nausea or vomiting    Lightheadedness or a sudden cold sweat    You become confused or have trouble speaking  You suddenly feel lightheaded or have trouble breathing  Return to the emergency department if:   You have a severe headache or vision loss  You have weakness in an arm or leg  Call your doctor or cardiologist if:   You feel faint, dizzy, confused, or drowsy  You have been taking your blood pressure medicine but your pressure is higher than your provider says it should be  You have questions or concerns about your condition or care  Medicines: You may  need any of the following:  Antihypertensives  may be used to help lower your blood pressure  Several kinds of medicines are available  Your healthcare provider will choose medicines based on the kind of hypertension you have  You may need more than one type of medicine  Take the medicine exactly as directed  Diuretics  help decrease extra fluid that collects in your body  This will help lower your blood pressure  You may urinate more often while you take this medicine  Cholesterol medicine  helps lower your cholesterol level  A low cholesterol level helps prevent heart disease and makes it easier to control your blood pressure  Take your medicine as directed  Contact your healthcare provider if you think your medicine is not helping or if you have side effects  Tell him or her if you are allergic to any medicine  Keep a list of the medicines, vitamins, and herbs you take  Include the amounts, and when and why you take them  Bring the list or the pill bottles to follow-up visits  Carry your medicine list with you in case of an emergency  Follow up with your doctor or cardiologist as directed: You will need to return to have your blood pressure checked and to have other lab tests done  Write down your questions so you remember to ask them during your visits  Stages of hypertension:       Normal blood pressure is 119/79 or lower   Your healthcare provider may only check your blood pressure each year if it stays at a normal level  Elevated blood pressure is 120/79 to 129/79   This is sometimes called prehypertension  Your healthcare provider may suggest lifestyle changes to help lower your blood pressure to a normal level  He or she may then check it again in 3 to 6 months  Stage 1 hypertension is 130/80  to 139/89   Your provider may recommend lifestyle changes, medication, and checks every 3 to 6 months until your blood pressure is controlled  Stage 2 hypertension is 140/90 or higher   Your provider will recommend lifestyle changes and have you take 2 kinds of hypertension medicines  You will also need to have your blood pressure checked monthly until it is controlled  Manage hypertension:   Check your blood pressure at home  Avoid smoking, caffeine, and exercise at least 30 minutes before checking your blood pressure  Sit and rest for 5 minutes before you take your blood pressure  Extend your arm and support it on a flat surface  Your arm should be at the same level as your heart  Follow the directions that came with your blood pressure monitor  Check your blood pressure 2 times, 1 minute apart, before you take your medicine in the morning   Also check your blood pressure before your evening meal  Keep a record of your readings and bring it to your follow-up visits  Ask your healthcare provider what your blood pressure should be  Manage any other health conditions you have  Health conditions such as diabetes can increase your risk for hypertension  Follow your healthcare provider's instructions and take all your medicines as directed  Ask about all medicines  Certain medicines can increase your blood pressure  Examples include oral birth control pills, decongestants, herbal supplements, and NSAIDs, such as ibuprofen  Your healthcare provider can tell you which medicines are safe for you to take  This includes prescription and over-the-counter medicines  Lifestyle changes you can make to manage hypertension:  Your healthcare provider may recommend you work with a team to manage hypertension  The team may include medical experts such as a dietitian, an exercise or physical therapist, and a behavior therapist  Your family members may be included in helping you create lifestyle changes  Limit sodium (salt) as directed  Too much sodium can affect your fluid balance  Check labels to find low-sodium or no-salt-added foods  Some low-sodium foods use potassium salts for flavor  Too much potassium can also cause health problems  Your healthcare provider will tell you how much sodium and potassium are safe for you to have in a day  He or she may recommend that you limit sodium to 2,300 mg a day  Follow the meal plan recommended by your healthcare provider  A dietitian or your provider can give you more information on low-sodium plans or the DASH (Dietary Approaches to Stop Hypertension) eating plan  The DASH plan is low in sodium, processed sugar, unhealthy fats, and total fat  It is high in potassium, calcium, and fiber  These can be found in vegetables, fruit, and whole-grain foods  Be physically active throughout the day    Physical activity, such as exercise, can help control your blood pressure and your weight  Be physically active for at least 30 minutes per day, on most days of the week  Include aerobic activity, such as walking or riding a bicycle  Also include strength training at least 2 times each week  Your healthcare providers can help you create a physical activity plan  Decrease stress  This may help lower your blood pressure  Learn ways to relax, such as deep breathing or listening to music  Limit alcohol as directed  Alcohol can increase your blood pressure  A drink of alcohol is 12 ounces of beer, 5 ounces of wine, or 1½ ounces of liquor  Do not smoke  Nicotine and other chemicals in cigarettes and cigars can increase your blood pressure and also cause lung damage  Ask your healthcare provider for information if you currently smoke and need help to quit  E-cigarettes or smokeless tobacco still contain nicotine  Talk to your healthcare provider before you use these products  © [facility] 2022 Information is for End User's use only and may not be sold, redistributed or otherwise used for commercial purposes  All illustrations and images included in CareNotes® are the copyrighted property of [facility] [employer]  or [address] [place], [place] [address]  The above information is an  only  It is not intended as medical advice for individual conditions or treatments  Talk to your doctor, nurse or pharmacist before following any medical regimen to see if it is safe and effective for you  Weight Management   WHAT YOU NEED TO KNOW:   Being overweight increases your risk of health conditions such as heart disease, high blood pressure, type 2 diabetes, and certain types of cancer  It can also increase your risk for osteoarthritis, sleep apnea, and other respiratory problems  Aim for a slow, steady weight loss  Even a small amount of weight loss can lower your risk of health problems    DISCHARGE INSTRUCTIONS: How to lose weight safely:  A safe and healthy way to lose weight is to eat fewer calories and get regular exercise  You can lose up about 1 pound a week by decreasing the number of calories you eat by 500 calories each day  You can decrease calories by eating smaller portion sizes or by cutting out high-calorie foods  Read labels to find out how many calories are in the foods you eat  You can also burn calories with exercise such as walking, swimming, or biking  You will be more likely to keep weight off if you make these changes part of your lifestyle  Exercise at least 30 minutes per day on most days of the week  You can also fit in more physical activity by taking the stairs instead of the elevator or parking farther away from stores  Ask your healthcare provider about the best exercise plan for you  Healthy meal plan for weight management:  A healthy meal plan includes a variety of foods, contains fewer calories, and helps you stay healthy  A healthy meal plan includes the following:     Eat whole-grain foods more often  A healthy meal plan should contain fiber  Fiber is the part of grains, fruits, and vegetables that is not broken down by your body  Whole-grain foods are healthy and provide extra fiber in your diet  Some examples of whole-grain foods are whole-wheat breads and pastas, oatmeal, brown rice, and bulgur  Eat a variety of vegetables every day  Include dark, leafy greens such as spinach, kale, maye greens, and mustard greens  Eat yellow and orange vegetables such as carrots, sweet potatoes, and winter squash  Eat a variety of fruits every day  Choose fresh or canned fruit (canned in its own juice or light syrup) instead of juice  Fruit juice has very little or no fiber  Eat low-fat dairy foods  Drink fat-free (skim) milk or 1% milk  Eat fat-free yogurt and low-fat cottage cheese  Try low-fat cheeses such as mozzarella and other reduced-fat cheeses      Choose meat and other protein foods that are low in fat  Choose beans or other legumes such as split peas or lentils  Choose fish, skinless poultry (chicken or turkey), or lean cuts of red meat (beef or pork)  Before you cook meat or poultry, cut off any visible fat  Use less fat and oil  Try baking foods instead of frying them  Add less fat, such as margarine, sour cream, regular salad dressing, and mayonnaise to foods  Eat fewer high-fat foods  Some examples of high-fat foods include french fries, doughnuts, ice cream, and cakes  Eat fewer sweets  Limit foods and drinks that are high in sugar  This includes candy, cookies, regular soda, and sweetened drinks  Ways to decrease calories:   Eat smaller portions  Use a small plate with smaller servings  Do not eat second helpings  When you eat at a restaurant, ask for a box and place half of your meal in the box before you eat  Share an entrée with someone else  Replace high-calorie snacks with healthy, low-calorie snacks  Choose fresh fruit, vegetables, fat-free rice cakes, or air-popped popcorn instead of potato chips, nuts, or chocolate  Choose water or calorie-free drinks instead of soda or sweetened drinks  Do not shop for groceries when you are hungry  You may be more likely to make unhealthy food choices  Take a grocery list of healthy foods and shop after you have eaten  Eat regular meals  Do not skip meals  Skipping meals can lead to overeating later in the day  This can make it harder for you to lose weight  Eat a healthy snack in place of a meal if you do not have time to eat a regular meal  Talk with a dietitian to help you create a meal plan and schedule that is right for you  Other things to consider as you try to lose weight:   Be aware of situations that may give you the urge to overeat, such as eating while watching television  Find ways to avoid these situations   For example, read a book, go for a walk, or do onelia  Meet with a weight loss support group or friends who are also trying to lose weight  This may help you stay motivated to continue working on your weight loss goals  © Copyright [employer] 2022 Information is for End User's use only and may not be sold, redistributed or otherwise used for commercial purposes  All illustrations and images included in CareNotes® are the copyrighted property of [employer]  or Brina Nelson  The above information is an  only  It is not intended as medical advice for individual conditions or treatments  Talk to your doctor, nurse or pharmacist before following any medical regimen to see if it is safe and effective for you  Benefits of an Active Lifestyle   AMBULATORY CARE:   An active lifestyle  means you do physical activity throughout the day  Any activity that gets you up and moving is part of an active lifestyle  Physical activity includes exercise such as walking or lifting weights  It also includes playing sports  Physical activity is different from other kinds of activity, such as reading a book  This kind of activity is called sedentary  A sedentary lifestyle means you sit or do not move much during the day  An active lifestyle has many benefits, such as helping you prevent or manage health conditions  Call your doctor if:   You notice changes in your health, such as new or worsening shortness of breath  You have questions or concerns about your condition or care  Benefits of an active lifestyle: You may be able to do daily activities more easily  Activity helps condition your heart, lungs, and muscles  This can help you get through your daily activities without feeling tired  You can help control your weight  Activity helps your body use the calories you eat instead of storing them as fat  Your body continues to burn calories at a higher rate after you are active  Activity can increase your health    Activity helps lower your risk for cancer, heart disease, diabetes, and stroke  Activity can help you control your blood pressure and blood sugar levels, and lower your cholesterol  If you have arthritis, activity can help your joints move more easily and with less pain  Your bones and muscles will get stronger  This will help prevent osteoporosis and reduce your risk for falls  Activity can help improve your mood  Activity can reduce or prevent depression and stress  Activity can also help improve your sleep  Risks of a sedentary lifestyle:  A sedentary lifestyle increases your risk for diseases such as diabetes, high blood pressure, and heart disease  Your immune system also becomes weaker  This means it cannot fight infections well  How much activity you need:  Any activity is better than no activity at all  When you go from being mostly inactive to adding some activity, you will see health benefits  The following are general guidelines:  Do aerobic activity several days each week  Aerobic activity includes walking, bicycling, dancing, swimming, and raking leaves  Aim for 150 to 300 minutes of moderate activity, or 75 to 150 of vigorous activity each week  You can also do a combination of moderate and vigorous activity  Do strength training at least 2 times each week  Strength training helps you keep the muscles you have and build new muscles  Strength training includes pushups, yoga, john chi, and weightlifting  If you do not have access to weights, you can lift items around your house  Try to work all the major muscle groups, such as your legs, arms, abdomen, and chest  Do 2 or 3 sets on each area  Use a weight that is slightly heavier than you can lift easily  You can work up to [employer]  You can also use resistance bands instead of weights  Steps you can take to become more active:   Set goals  Set some long-term goals and some short-term goals   For example, you may want to be able to walk for 30 minutes without becoming short of breath  Try not to put time requirements on your goals  For example, do not think you should reach your goal in a month  Set smaller goals, such as walking a little longer each week, or feeling less shortness of breath  Be active all day  Activity does not have to mean structured exercise each day  You can be more active by making small changes all day  For example, try parking as far from the entrance of buildings as you can when you run errands  If possible, walk or ride a bike instead of driving  Take the stairs instead of the elevator  Keep a record of your activity and your progress  You can do this by writing down your daily activity  Include the kind of activity and how long you did it  You can also use a program on your phone or other device that will track activity for you  Also record your progress  You may be doing daily activities more easily, sleeping better, or building muscles  Step counting can help you monitor activity  A general guide is to take 10,000 steps each day  A pedometer is a device you can wear to track your steps  Some phones have programs that will count and record steps  You may need to work up to 10,000 steps  Start by finding out how many steps you usually take in a day  Then try to take more steps each day than you took the day before  Tips to help you stay on track:   Start slowly and work up  You do not have to do 30 minutes of activity at one time  You can break the activity up and do a few minutes at a time  Remember that some physical activity is better than none  Stand up during the day, even if you cannot walk around  Your body uses more energy when you stand  You may be able to get a desk that allows you to stand while you type or make phone calls for work  Aim for a speed or intensity that is challenging but not too difficult  You should be able to speak a few words at a time but not be able to sing      Plan activities you enjoy  Do a variety of activities so you do not become bored and you stay challenged  Include activities that strengthen your bones  These activities are called weight-bearing exercises  Examples include tennis, jumping rope, and running  Swimming, riding a bike, and similar exercises keep weight off your bones  They will not help strengthen bones, but they will help your heart and lungs work better  Ask for support from the people in your life  Go for a walk after dinner with your family  Meet friends at the park  Take a break with a coworker and walk around  Find someone who likes to go to the gym at the same time you do  You may be more likely to go if you know another person is counting on you  Get involved in community events, such as cleaning a community park  Ask someone to help you stay on track  For example, you can tell the person about your daily or weekly activity  Treat yourself to a reward when you reach a goal   The rewards can be for activity done for a certain amount of time each day or days each week  Rewards can also be for progress you make  Have rewards that are not food, such as a new clothing item or book  What you need to know about nutrition and activity:  Healthy foods will give you the energy you need to be active  Activity and good nutrition work together to help you reach or maintain a healthy weight  Healthy foods include fruits, vegetables, lean meats, fish, cooked beans, whole-grain breads, and low-fat dairy products  Your healthcare provider can help you create a healthy meal plan  He or she can tell you how many calories you need to stay active and still lose weight if needed  Follow up with your doctor as directed:  Write down your questions so you remember to ask them during your visits  © Copyright [employer] 2022 Information is for End User's use only and may not be sold, redistributed or otherwise used for commercial purposes   All illustrations and images included in CareNotes® are the copyrighted property of [employer]  or [employer]   The above information is an  only  It is not intended as medical advice for individual conditions or treatments  Talk to your doctor, nurse or pharmacist before following any medical regimen to see if it is safe and effective for you  Leg Edema   AMBULATORY CARE:   Leg edema  is swelling caused by fluid buildup  Your legs may swell if you sit or stand for long periods of time, are pregnant, or are injured  Swelling may also occur if you have heart failure or circulation problems  This means that your heart does not pump blood through your body as it should  Call your local emergency number (911 in the [address] [facility]) for any of the following: You cannot walk  You have chest pain or trouble breathing that is worse when you lie down  You suddenly feel lightheaded and have trouble breathing  You have new and sudden chest pain  You may have more pain when you take deep breaths or cough  You cough up blood  Seek care immediately if:   You feel faint or confused  Your skin turns blue or gray  Your leg feels warm, tender, and painful  It may be swollen and red  Call your doctor if:   You have a fever or feel more tired than usual     The veins in your legs look larger than usual  They may look full or bulging  Your legs itch or feel heavy  You have red or white areas or sores on your legs  The skin may also appear dimpled or have indentations  You are gaining weight  You have trouble moving your ankles  The swelling does not go away, or other parts of your body swell  You have questions or concerns about your condition or care  Self-care:   Elevate your legs  Raise your legs above the level of your heart as often as you can  This will help decrease swelling and pain  Prop your legs on pillows or blankets to keep them elevated comfortably           Wear pressure stockings, if directed  These tight stockings put pressure on your legs to promote blood flow and prevent blood clots  Put them on before you get out of bed  Wear the stockings during the day  Do not wear them while you sleep  Stay active  Do not stand or sit for long periods of time  Ask your healthcare provider about the best exercise plan for you  Eat healthy foods  Healthy foods include fruits, vegetables, whole-grain breads, low-fat dairy products, beans, lean meats, and fish  Ask if you need to be on a special diet  Limit sodium (salt)  Salt will make your body hold even more fluid  Your healthcare provider will tell you how many milligrams (mg) of salt you can have each day  Follow up with your doctor as directed:  Write down your questions so you remember to ask them during your visits  © Copyright [employer] 2022 Information is for End User's use only and may not be sold, redistributed or otherwise used for commercial purposes  All illustrations and images included in CareNotes® are the copyrighted property of [employer]  or [facility]   The above information is an  only  It is not intended as medical advice for individual conditions or treatments  Talk to your doctor, nurse or pharmacist before following any medical regimen to see if it is safe and effective for you        Judy Swenson, [id number] [address]

## 2022-12-06 NOTE — ASSESSMENT & PLAN NOTE
Patient with a longstanding history of hypertension  He has been prescribed amlodipine in the past however has not taking this medication  His blood pressure in the office today is 140/80  Information regarding the DASH diet was provided to the patient  He is agreeable to going on the amlodipine today  This was sent to his pharmacy  Side effects discussed  I will see him back in 1 month to review blood work and to recheck his blood pressure

## 2022-12-06 NOTE — ASSESSMENT & PLAN NOTE
Last eye exam in January 2022  The patient will be due for an eye exam next month  He reports his vision in his right eye is more blurry

## 2022-12-06 NOTE — PATIENT INSTRUCTIONS
Cholesterol and Your Health   AMBULATORY CARE:   Cholesterol  is a waxy, fat-like substance  Your body uses cholesterol to make hormones and new cells, and to protect nerves  Cholesterol is made by your body  It also comes from certain foods you eat, such as meat and dairy products  Your healthcare provider can help you set goals for your cholesterol levels  He or she can help you create a plan to meet your goals  Cholesterol level goals: Your cholesterol level goals depend on your risk for heart disease, your age, and your other health conditions  The following are general guidelines: Total cholesterol  includes low-density lipoprotein (LDL), high-density lipoprotein (HDL), and triglyceride levels  The total cholesterol level should be lower than 200 mg/dL and is best at about 150 mg/dL  LDL cholesterol  is called bad cholesterol  because it forms plaque in your arteries  As plaque builds up, your arteries become narrow, and less blood flows through  When plaque decreases blood flow to your heart, you may have chest pain  If plaque completely blocks an artery that brings blood to your heart, you may have a heart attack  Plaque can break off and form blood clots  Blood clots may block arteries in your brain and cause a stroke  The level should be less than 130 mg/dL and is best at about 100 mg/dL  HDL cholesterol  is called good cholesterol  because it helps remove LDL cholesterol from your arteries  It does this by attaching to LDL cholesterol and carrying it to your liver  Your liver breaks down LDL cholesterol so your body can get rid of it  High levels of HDL cholesterol can help prevent a heart attack and stroke  Low levels of HDL cholesterol can increase your risk for heart disease, heart attack, and stroke  The level should be 60 mg/dL or higher  Triglycerides  are a type of fat that store energy from foods you eat  High levels of triglycerides also cause plaque buildup   This can increase your risk for a heart attack or stroke  If your triglyceride level is high, your LDL cholesterol level may also be high  The level should be less than 150 mg/dL  Any of the following can increase your risk for high cholesterol:   Smoking cigarettes    Being overweight or obese, or not getting enough exercise    Drinking large amounts of alcohol    A medical condition such as hypertension (high blood pressure) or diabetes    Certain genes passed from your parents to you    Age older than 65 years    What you need to know about having your cholesterol levels checked: Adults 21to 39years of age should have their cholesterol levels checked every 4 to 6 years  Adults 45 years or older should have their cholesterol checked every 1 to 2 years  You may need your cholesterol checked more often, or at a younger age, if you have risk factors for heart disease  You may also need to have your cholesterol checked more often if you have other health conditions, such as diabetes  Blood tests are used to check cholesterol levels  Blood tests measure your levels of triglycerides, LDL cholesterol, and HDL cholesterol  How healthy fats affect your cholesterol levels:  Healthy fats, also called unsaturated fats, help lower LDL cholesterol and triglyceride levels  Healthy fats include the following:  Monounsaturated fats  are found in foods such as olive oil, canola oil, avocado, nuts, and olives  Polyunsaturated fats,  such as omega 3 fats, are found in fish, such as salmon, trout, and tuna  They can also be found in plant foods such as flaxseed, walnuts, and soybeans  How unhealthy fats affect your cholesterol levels:  Unhealthy fats increase LDL cholesterol and triglyceride levels  They are found in foods high in cholesterol, saturated fat, and trans fat:  Cholesterol  is found in eggs, dairy, and meat  Saturated fat  is found in butter, cheese, ice cream, whole milk, and coconut oil   Saturated fat is also found in meat, such as sausage, hot dogs, and bologna  Trans fat  is found in liquid oils and is used in fried and baked foods  Foods that contain trans fats include chips, crackers, muffins, sweet rolls, microwave popcorn, and cookies  Treatment  for high cholesterol will also decrease your risk of heart disease, heart attack, and stroke  Treatment may include any of the following:  Lifestyle changes  may include food, exercise, weight loss, and quitting smoking  You may also need to decrease the amount of alcohol you drink  Your healthcare provider will want you to start with lifestyle changes  Other treatment may be added if lifestyle changes are not enough  Your healthcare provider may recommend you work with a team to manage hyperlipidemia  The team may include medical experts such as a dietitian, an exercise or physical therapist, and a behavior therapist  Your family members may be included in helping you create lifestyle changes  Medicines  may be given to lower your LDL cholesterol, triglyceride levels, or total cholesterol level  You may need medicines to lower your cholesterol if any of the following is true:    You have a history of stroke, TIA, unstable angina, or a heart attack  Your LDL cholesterol level is 190 mg/dL or higher  You are age 36 to 76 years, have diabetes or heart disease risk factors, and your LDL cholesterol is 70 mg/dL or higher  Supplements  include fish oil, red yeast rice, and garlic  Fish oil may help lower your triglyceride and LDL cholesterol levels  It may also increase your HDL cholesterol level  Red yeast rice may help decrease your total cholesterol level and LDL cholesterol level  Garlic may help lower your total cholesterol level  Do not take any supplements without talking to your healthcare provider  Food changes you can make to lower your cholesterol levels:  A dietitian can help you create a healthy eating plan   He or she can show you how to read food labels and choose foods low in saturated fat, trans fats, and cholesterol  Decrease the total amount of fat you eat  Choose lean meats, fat-free or 1% fat milk, and low-fat dairy products, such as yogurt and cheese  Try to limit or avoid red meats  Limit or do not eat fried foods or baked goods, such as cookies  Replace unhealthy fats with healthy fats  Cook foods in olive oil or canola oil  Choose soft margarines that are low in saturated fat and trans fat  Seeds, nuts, and avocados are other examples of healthy fats  Eat foods with omega-3 fats  Examples include salmon, tuna, mackerel, walnuts, and flaxseed  Eat fish 2 times per week  Pregnant women should not eat fish that have high levels of mercury, such as shark, swordfish, and samantha mackerel  Increase the amount of high-fiber foods you eat  High-fiber foods can help lower your LDL cholesterol  Aim to get between 20 and 30 grams of fiber each day  Fruits and vegetables are high in fiber  Eat at least 5 servings each day  Other high-fiber foods are whole-grain or whole-wheat breads, pastas, or cereals, and brown rice  Eat 3 ounces of whole-grain foods each day  Increase fiber slowly  You may have abdominal discomfort, bloating, and gas if you add fiber to your diet too quickly  Eat healthy protein foods  Examples include low-fat dairy products, skinless chicken and turkey, fish, and nuts  Limit foods and drinks that are high in sugar  Your dietitian or healthcare provider can help you create daily limits for high-sugar foods and drinks  The limit may be lower if you have diabetes or another health condition  Limits can also help you lose weight if needed  Lifestyle changes you can make to lower your cholesterol levels:   Maintain a healthy weight  Ask your healthcare provider what a healthy weight is for you  Ask him or her to help you create a weight loss plan if needed   Weight loss can decrease your total cholesterol and triglyceride levels  Weight loss may also help keep your blood pressure at a healthy level  Be physically active throughout the day  Physical activity, such as exercise, can help lower your total cholesterol level and maintain a healthy weight  Physical activity can also help increase your HDL cholesterol level  Work with your healthcare provider to create an program that is right for you  Get at least 30 to 40 minutes of moderate physical activity most days of the week  Examples of exercise include brisk walking, swimming, or biking  Also include strength training at least 2 times each week  Your healthcare providers can help you create a physical activity plan  Do not smoke  Nicotine and other chemicals in cigarettes and cigars can raise your cholesterol levels  Ask your healthcare provider for information if you currently smoke and need help to quit  E-cigarettes or smokeless tobacco still contain nicotine  Talk to your healthcare provider before you use these products  Limit or do not drink alcohol  Alcohol can increase your triglyceride levels  Ask your healthcare provider before you drink alcohol  Ask how much is okay for you to drink in 24 hours or 1 week  Follow up with your doctor as directed:  Write down your questions so you remember to ask them during your visits  © Copyright [employer] 2022 Information is for End User's use only and may not be sold, redistributed or otherwise used for commercial purposes  All illustrations and images included in CareNotes® are the copyrighted property of A D A M , Inc  or [facility] [place]   The above information is an  only  It is not intended as medical advice for individual conditions or treatments  Talk to your doctor, nurse or pharmacist before following any medical regimen to see if it is safe and effective for you  Good fat  Good fats come mainly from vegetables, nuts, seeds, and fish   They differ from saturated fats by having fewer hydrogen atoms bonded to their carbon chains  Healthy fats are liquid at room temperature, not solid  There are two broad categories of beneficial fats: monounsaturated and polyunsaturated fats  Monounsaturated fats  When you dip your bread in olive oil at an [place], you're getting mostly monounsaturated fat  Monounsaturated fats have a single carbon-to-carbon double bond  The result is that it has two fewer hydrogen atoms than a saturated fat and a bend at the double bond  This structure keeps monounsaturated fats liquid at room temperature  Good sources of monounsaturated fats are olive oil, peanut oil, canola oil, avocados, and most nuts, as well as high-oleic safflower and sunflower oils  The discovery that monounsaturated fat could be healthful came from the [place] Study during the 1960s  It revealed that people in [place] and other parts of the [facility] enjoyed a low rate of heart disease despite a high-fat diet  The main fat in their diet, though, was not the saturated animal fat common in countries with higher rates of heart disease  It was olive oil, which contains mainly monounsaturated fat  This finding produced a surge of interest in olive oil and the "Mediterranean diet," a style of eating regarded as a healthful choice today  Although there's no recommended daily intake of monounsaturated fats, the [employer] recommends using them as much as possible along with polyunsaturated fats to replace saturated and trans fats  Polyunsaturated fats  When you pour liquid cooking oil into a pan, there's a good chance you're using polyunsaturated fat  Corn oil, sunflower oil, and safflower oil are common examples  Polyunsaturated fats are essential fats  That means they're required for normal body functions but your body can't make them  So you must get them from food   Polyunsaturated fats are used to build cell membranes and the covering of nerves  They are needed for blood clotting, muscle movement, and inflammation  A polyunsaturated fat has two or more double bonds in its carbon chain  There are two main types of polyunsaturated fats: omega-3 fatty acids and omega-6 fatty acids  The numbers refer to the distance between the beginning of the carbon chain and the first double bond  Both types offer health benefits  Eating polyunsaturated fats in place of saturated fats or highly refined carbohydrates reduces harmful LDL cholesterol and improves the cholesterol profile  It also lowers triglycerides  Good sources of omega-3 fatty acids include fatty fish such as salmon, mackerel, and sardines, flaxseeds, walnuts, canola oil, and unhydrogenated soybean oil  Omega-3 fatty acids may help prevent and even treat heart disease and stroke  In addition to reducing blood pressure, raising HDL, and lowering triglycerides, polyunsaturated fats may help prevent lethal heart rhythms from arising  Evidence also suggests they may help reduce the need for corticosteroid medications in people with rheumatoid arthritis  Studies linking omega-3s to a wide range of other health improvements, including reducing risk of dementia, are inconclusive, and some of them have major flaws, according to a systematic review of the evidence by the Agency for Healthcare Research and Quality  Omega-6 fatty acids have also been linked to protection against heart disease  Foods rich in linoleic acid and other omega-6 fatty acids include vegetable oils such as safflower, soybean, sunflower, walnut, and corn oils  DASH Eating Plan   WHAT YOU NEED TO KNOW:   The DASH (Dietary Approaches to Stop Hypertension) Eating Plan is designed to help prevent or lower high blood pressure  It can also help to lower LDL (bad) cholesterol and decrease your risk for heart disease  The plan is low in sodium, sugar, unhealthy fats, and total fat   It is high in potassium, calcium, magnesium, and fiber  These nutrients are added when you eat more fruits, vegetables, and whole grains  With the DASH eating plan, you need to eat a certain number of servings from each food group  This will help you get enough of certain nutrients and limit others  The amount of servings you should eat depends on how many calories you need  Your dietitian can help you create meal plans with the right number of servings for each food group  DISCHARGE INSTRUCTIONS:   What you need to know about sodium:  Your dietitian will tell you how much sodium is safe for you to have each day  People with high blood pressure should have no more than 1,500 to 2,300 mg of sodium in a day  A teaspoon (tsp) of salt has 2,300 mg of sodium  This may seem like a difficult goal, but small changes to the foods you eat can make a big difference  Your healthcare provider or dietitian can help you create a meal plan that follows your sodium limit  Read food labels  Food labels can help you choose foods that are low in sodium  The amount of sodium is listed in milligrams (mg)  The % Daily Value (DV) column tells you how much of your daily needs are met by 1 serving of the food for each nutrient listed  Choose foods that have less than 5% of the DV of sodium  These foods are considered low in sodium  Foods that have 20% or more of the DV of sodium are considered high in sodium  Avoid foods that have more than 300 mg of sodium in each serving  Choose foods that say low-sodium, reduced-sodium, or no salt added on the food label  Limit added salt  Do not salt food at the table if you add salt when you cook  Use herbs and spices, such as onions, garlic, and salt-free seasonings to add flavor  Try lemon or lime juice or vinegar to add a tart flavor  Use hot peppers or a small amount of hot pepper sauce to add a spicy flavor   Limit foods high in added salt, such as the following:    Seasonings made with salt, such as garlic salt, celery salt, onion salt, seasoned salt, meat tenderizers, and monosodium glutamate (MSG)    Miso soup and canned or dried soup mixes    Regular soy sauce, barbecue sauce, teriyaki sauce, steak sauce, Worcestershire sauce, and most flavored vinegars    Snack foods, such as salted chips, popcorn, pretzels, pork rinds, salted crackers, and salted nuts    Frozen foods, such as dinners, entrees, vegetables with sauces, and breaded meats    Ask about salt substitutes  Ask your healthcare provider if you may use salt substitutes  Some salt substitutes have ingredients that can be harmful if you have certain health conditions  Choose foods carefully at restaurants  Meals from restaurants, especially fast food restaurants, are often high in sodium  Some restaurants have nutrition information that tells you the amount of sodium in their foods  Ask to have your food prepared with less, or no salt  What you need to know about fats:  Healthy fats include unsaturated fats and omega-3 fatty acids  Unhealthy fats include saturated fats and trans fats    Include healthy fats, such as the following:      Cooking oils, such as soybean, canola, olive, or sunflower    Fatty fish, such as salmon, tuna, mackerel, or sardines    Flaxseed oil or ground flaxseed    ½ cup of cooked beans, such as black beans, kidney beans, or viera beans    1½ ounces of low-sodium nuts, such as almonds or walnuts    Low-sugar, low-sodium peanut butter    Seeds such as ora seeds or sunflower seeds       Limit or do not have unhealthy fats, such as the following:      Foods that contain fat from animals, such as fatty meats, whole milk, butter, and cream    Shortening, stick margarine, palm oil, and coconut oil    Full-fat or creamy salad dressing    Creamy soup    Crackers, chips, and baked goods made with margarine or shortening    Foods that are fried in unhealthy fats    Gravy and sauces, such as Hans or cheese sauces    What you need to know about carbohydrates (carbs): All carbs break down into sugar  Complex carbs contain more fiber than simple carbs  This means complex carbs go into the bloodstream more slowly and cause less of a blood sugar spike  Try to include more complex carbs and fewer simple carbs  Include complex carbs, such as the followin slice of whole-grain bread    1 ounce of dry cereal that does not contain added sugar    ½ cup of cooked oatmeal    2 ounces of cooked whole-grain pasta    ½ cup of cooked brown rice    Limit or do not have simple carbs, such as the following:      [employer], such as doughnuts, pastries, and cookies    Mixes for cornbread and biscuits    White rice and pasta mixes, such as boxed macaroni and cheese    Instant and cold cereals that contain sugar    Jelly, jam, and ice cream that contain sugar    Condiments such as ketchup    Drinks high in sugar, such as soft drinks, lemonade, and fruit juice    What you need to know about vegetables and fruits:  Vegetables and fruits can be fresh, frozen, or canned  If possible, try to choose low-sodium canned options    Include a variety of vegetables and fruits, such as the followin medium apple, pear, or peach (about ½ cup chopped)    ½ small banana    ½ cup berries, such as blueberries, strawberries, or blackberries    1 cup of raw leafy greens, such as lettuce, spinach, kale, or maye greens    ½ cup of frozen or canned (no added salt) vegetables, such as green beans    ½ cup of fresh, frozen, or canned fruit (canned in light syrup or fruit juice)    ½ cup of vegetable or fruit juice    Limit or do not have vegetables and fruits made in the following ways:      Frozen fruit such as cherries that have added sugar    Fruit in cream or butter sauce    Canned vegetables that are high in sodium    Sauerkraut, pickled vegetables, and other foods prepared in brine    Fried vegetables or vegetables in butter or high-fat sauces    What you need to know about protein foods: Include lean or low-fat protein foods, such as the following:      Poultry (chicken, turkey) with no skin    Fish (especially fatty fish, such as salmon, fresh tuna, or mackerel)    Lean beef and pork (loin, round, extra lean hamburger)    Egg whites and egg substitutes    1 cup of nonfat (skim) or 1% milk    1½ ounces of fat-free or low-fat cheese    6 ounces of nonfat or low-fat yogurt    Limit or do not have high-fat protein foods, such as the following:      Smoked or cured meat, such as corned beef, becerril, ham, hot dogs, and sausage    Canned beans and canned meats or spreads, such as potted meats, sardines, anchovies, and imitation seafood    Deli or lunch meats, such as bologna, ham, turkey, and roast beef    High-fat meat (T-bone steak, regular hamburger, and ribs)    Whole eggs and egg yolks    Whole milk, 2% milk, and cream    Regular cheese and processed cheese    Other guidelines to follow:   Maintain a healthy weight  Your risk for heart disease is higher if you are overweight  Your healthcare provider may suggest that you lose weight if you are overweight  You can lose weight by eating fewer calories and foods that have added sugars and fat  The DASH meal plan can help you do this  Decrease calories by eating smaller portions at each meal and fewer snacks  Ask your healthcare provider for more information about how to lose weight  Exercise regularly  Regular exercise can help you reach or maintain a healthy weight  Regular exercise can also help decrease your blood pressure and improve your cholesterol levels  Get 30 minutes or more of moderate exercise each day of the week  To lose weight, get at least 60 minutes of exercise  Talk to your healthcare provider about the best exercise program for you  Limit alcohol  Women should limit alcohol to 1 drink a day  Men should limit alcohol to 2 drinks a day   A drink of alcohol is 12 ounces of beer, 5 ounces of wine, or 1½ ounces of liquor  For more information:   [facility] and [address]  [address] [address]  [place] [address]  Phone: [phone]  Web Address: Shi no    © 94[address] [facility] 2022 Information is for End User's use only and may not be sold, redistributed or otherwise used for commercial purposes  All illustrations and images included in CareNotes® are the copyrighted property of [employer] , Inc  or [facility] [place]   The above information is an  only  It is not intended as medical advice for individual conditions or treatments  Talk to your doctor, nurse or pharmacist before following any medical regimen to see if it is safe and effective for you  Hypertension   WHAT YOU NEED TO KNOW:   Hypertension is high blood pressure  Your blood pressure is the force of your blood moving against the walls of your arteries  Hypertension causes your blood pressure to get so high that your heart has to work much harder than normal  This can damage your heart  The cause of hypertension may not be known  This is called essential or primary hypertension  Hypertension caused by another medical condition, such as kidney disease, is called secondary hypertension  DISCHARGE INSTRUCTIONS:   Call your local emergency number (911 in the [address] [place] [address],[address]) or have someone call if:   You have chest pain  You have any of the following signs of a heart attack:      Squeezing, pressure, or pain in your chest    You may  also have any of the following:     Discomfort or pain in your back, neck, jaw, stomach, or arm    Shortness of breath    Nausea or vomiting    Lightheadedness or a sudden cold sweat    You become confused or have trouble speaking  You suddenly feel lightheaded or have trouble breathing  Return to the emergency department if:   You have a severe headache or vision loss  You have weakness in an arm or leg      Call your doctor or cardiologist if:   You feel faint, dizzy, confused, or drowsy  You have been taking your blood pressure medicine but your pressure is higher than your provider says it should be  You have questions or concerns about your condition or care  Medicines: You may  need any of the following:  Antihypertensives  may be used to help lower your blood pressure  Several kinds of medicines are available  Your healthcare provider will choose medicines based on the kind of hypertension you have  You may need more than one type of medicine  Take the medicine exactly as directed  Diuretics  help decrease extra fluid that collects in your body  This will help lower your blood pressure  You may urinate more often while you take this medicine  Cholesterol medicine  helps lower your cholesterol level  A low cholesterol level helps prevent heart disease and makes it easier to control your blood pressure  Take your medicine as directed  Contact your healthcare provider if you think your medicine is not helping or if you have side effects  Tell him or her if you are allergic to any medicine  Keep a list of the medicines, vitamins, and herbs you take  Include the amounts, and when and why you take them  Bring the list or the pill bottles to follow-up visits  Carry your medicine list with you in case of an emergency  Follow up with your doctor or cardiologist as directed: You will need to return to have your blood pressure checked and to have other lab tests done  Write down your questions so you remember to ask them during your visits  Stages of hypertension:       Normal blood pressure is 119/79 or lower   Your healthcare provider may only check your blood pressure each year if it stays at a normal level  Elevated blood pressure is 120/79 to 129/79   This is sometimes called prehypertension  Your healthcare provider may suggest lifestyle changes to help lower your blood pressure to a normal level   He or she may then check it again in 3 to 6 months  Stage 1 hypertension is 130/80  to 139/89   Your provider may recommend lifestyle changes, medication, and checks every 3 to 6 months until your blood pressure is controlled  Stage 2 hypertension is 140/90 or higher   Your provider will recommend lifestyle changes and have you take 2 kinds of hypertension medicines  You will also need to have your blood pressure checked monthly until it is controlled  Manage hypertension:   Check your blood pressure at home  Avoid smoking, caffeine, and exercise at least 30 minutes before checking your blood pressure  Sit and rest for 5 minutes before you take your blood pressure  Extend your arm and support it on a flat surface  Your arm should be at the same level as your heart  Follow the directions that came with your blood pressure monitor  Check your blood pressure 2 times, 1 minute apart, before you take your medicine in the morning  Also check your blood pressure before your evening meal  Keep a record of your readings and bring it to your follow-up visits  Ask your healthcare provider what your blood pressure should be  Manage any other health conditions you have  Health conditions such as diabetes can increase your risk for hypertension  Follow your healthcare provider's instructions and take all your medicines as directed  Ask about all medicines  Certain medicines can increase your blood pressure  Examples include oral birth control pills, decongestants, herbal supplements, and NSAIDs, such as ibuprofen  Your healthcare provider can tell you which medicines are safe for you to take  This includes prescription and over-the-counter medicines  Lifestyle changes you can make to manage hypertension:  Your healthcare provider may recommend you work with a team to manage hypertension   The team may include medical experts such as a dietitian, an exercise or physical therapist, and a behavior therapist  Your family members may be included in helping you create lifestyle changes  Limit sodium (salt) as directed  Too much sodium can affect your fluid balance  Check labels to find low-sodium or no-salt-added foods  Some low-sodium foods use potassium salts for flavor  Too much potassium can also cause health problems  Your healthcare provider will tell you how much sodium and potassium are safe for you to have in a day  He or she may recommend that you limit sodium to 2,300 mg a day  Follow the meal plan recommended by your healthcare provider  A dietitian or your provider can give you more information on low-sodium plans or the DASH (Dietary Approaches to Stop Hypertension) eating plan  The DASH plan is low in sodium, processed sugar, unhealthy fats, and total fat  It is high in potassium, calcium, and fiber  These can be found in vegetables, fruit, and whole-grain foods  Be physically active throughout the day  Physical activity, such as exercise, can help control your blood pressure and your weight  Be physically active for at least 30 minutes per day, on most days of the week  Include aerobic activity, such as walking or riding a bicycle  Also include strength training at least 2 times each week  Your healthcare providers can help you create a physical activity plan  Decrease stress  This may help lower your blood pressure  Learn ways to relax, such as deep breathing or listening to music  Limit alcohol as directed  Alcohol can increase your blood pressure  A drink of alcohol is 12 ounces of beer, 5 ounces of wine, or 1½ ounces of liquor  Do not smoke  Nicotine and other chemicals in cigarettes and cigars can increase your blood pressure and also cause lung damage  Ask your healthcare provider for information if you currently smoke and need help to quit  E-cigarettes or smokeless tobacco still contain nicotine   Talk to your healthcare provider before you use these products  © Copyright [employer] 2022 Information is for End User's use only and may not be sold, redistributed or otherwise used for commercial purposes  All illustrations and images included in CareNotes® are the copyrighted property of [employer] D [employer] M [employer]  or Brina Nelson  The above information is an  only  It is not intended as medical advice for individual conditions or treatments  Talk to your doctor, nurse or pharmacist before following any medical regimen to see if it is safe and effective for you  Weight Management   WHAT YOU NEED TO KNOW:   Being overweight increases your risk of health conditions such as heart disease, high blood pressure, type 2 diabetes, and certain types of cancer  It can also increase your risk for osteoarthritis, sleep apnea, and other respiratory problems  Aim for a slow, steady weight loss  Even a small amount of weight loss can lower your risk of health problems  DISCHARGE INSTRUCTIONS:   How to lose weight safely:  A safe and healthy way to lose weight is to eat fewer calories and get regular exercise  You can lose up about 1 pound a week by decreasing the number of calories you eat by 500 calories each day  You can decrease calories by eating smaller portion sizes or by cutting out high-calorie foods  Read labels to find out how many calories are in the foods you eat  You can also burn calories with exercise such as walking, swimming, or biking  You will be more likely to keep weight off if you make these changes part of your lifestyle  Exercise at least 30 minutes per day on most days of the week  You can also fit in more physical activity by taking the stairs instead of the elevator or parking farther away from stores  Ask your healthcare provider about the best exercise plan for you  Healthy meal plan for weight management:  A healthy meal plan includes a variety of foods, contains fewer calories, and helps you stay healthy   A healthy meal plan includes the following:     Eat whole-grain foods more often  A healthy meal plan should contain fiber  Fiber is the part of grains, fruits, and vegetables that is not broken down by your body  Whole-grain foods are healthy and provide extra fiber in your diet  Some examples of whole-grain foods are whole-wheat breads and pastas, oatmeal, brown rice, and bulgur  Eat a variety of vegetables every day  Include dark, leafy greens such as spinach, kale, maye greens, and mustard greens  Eat yellow and orange vegetables such as carrots, sweet potatoes, and winter squash  Eat a variety of fruits every day  Choose fresh or canned fruit (canned in its own juice or light syrup) instead of juice  Fruit juice has very little or no fiber  Eat low-fat dairy foods  Drink fat-free (skim) milk or 1% milk  Eat fat-free yogurt and low-fat cottage cheese  Try low-fat cheeses such as mozzarella and other reduced-fat cheeses  Choose meat and other protein foods that are low in fat  Choose beans or other legumes such as split peas or lentils  Choose fish, skinless poultry (chicken or turkey), or lean cuts of red meat (beef or pork)  Before you cook meat or poultry, cut off any visible fat  Use less fat and oil  Try baking foods instead of frying them  Add less fat, such as margarine, sour cream, regular salad dressing, and mayonnaise to foods  Eat fewer high-fat foods  Some examples of high-fat foods include french fries, doughnuts, ice cream, and cakes  Eat fewer sweets  Limit foods and drinks that are high in sugar  This includes candy, cookies, regular soda, and sweetened drinks  Ways to decrease calories:   Eat smaller portions  Use a small plate with smaller servings  Do not eat second helpings  When you eat at a restaurant, ask for a box and place half of your meal in the box before you eat  Share an entrée with someone else      Replace high-calorie snacks with healthy, low-calorie snacks  Choose fresh fruit, vegetables, fat-free rice cakes, or air-popped popcorn instead of potato chips, nuts, or chocolate  Choose water or calorie-free drinks instead of soda or sweetened drinks  Do not shop for groceries when you are hungry  You may be more likely to make unhealthy food choices  Take a grocery list of healthy foods and shop after you have eaten  Eat regular meals  Do not skip meals  Skipping meals can lead to overeating later in the day  This can make it harder for you to lose weight  Eat a healthy snack in place of a meal if you do not have time to eat a regular meal  Talk with a dietitian to help you create a meal plan and schedule that is right for you  Other things to consider as you try to lose weight:   Be aware of situations that may give you the urge to overeat, such as eating while watching television  Find ways to avoid these situations  For example, read a book, go for a walk, or do crafts  Meet with a weight loss support group or friends who are also trying to lose weight  This may help you stay motivated to continue working on your weight loss goals  © Copyright [employer] 2022 Information is for End User's use only and may not be sold, redistributed or otherwise used for commercial purposes  All illustrations and images included in CareNotes® are the copyrighted property of [employer]  or Brina Anne   The above information is an  only  It is not intended as medical advice for individual conditions or treatments  Talk to your doctor, nurse or pharmacist before following any medical regimen to see if it is safe and effective for you  Benefits of an Active Lifestyle   AMBULATORY CARE:   An active lifestyle  means you do physical activity throughout the day  Any activity that gets you up and moving is part of an active lifestyle  Physical activity includes exercise such as walking or lifting weights   It also includes playing sports  Physical activity is different from other kinds of activity, such as reading a book  This kind of activity is called sedentary  A sedentary lifestyle means you sit or do not move much during the day  An active lifestyle has many benefits, such as helping you prevent or manage health conditions  Call your doctor if:   You notice changes in your health, such as new or worsening shortness of breath  You have questions or concerns about your condition or care  Benefits of an active lifestyle: You may be able to do daily activities more easily  Activity helps condition your heart, lungs, and muscles  This can help you get through your daily activities without feeling tired  You can help control your weight  Activity helps your body use the calories you eat instead of storing them as fat  Your body continues to burn calories at a higher rate after you are active  Activity can increase your health  Activity helps lower your risk for cancer, heart disease, diabetes, and stroke  Activity can help you control your blood pressure and blood sugar levels, and lower your cholesterol  If you have arthritis, activity can help your joints move more easily and with less pain  Your bones and muscles will get stronger  This will help prevent osteoporosis and reduce your risk for falls  Activity can help improve your mood  Activity can reduce or prevent depression and stress  Activity can also help improve your sleep  Risks of a sedentary lifestyle:  A sedentary lifestyle increases your risk for diseases such as diabetes, high blood pressure, and heart disease  Your immune system also becomes weaker  This means it cannot fight infections well  How much activity you need:  Any activity is better than no activity at all  When you go from being mostly inactive to adding some activity, you will see health benefits   The following are general guidelines:  Do aerobic activity several days each week   Aerobic activity includes walking, bicycling, dancing, swimming, and raking leaves  Aim for 150 to 300 minutes of moderate activity, or 75 to 150 of vigorous activity each week  You can also do a combination of moderate and vigorous activity  Do strength training at least 2 times each week  Strength training helps you keep the muscles you have and build new muscles  Strength training includes pushups, yoga, john chi, and weightlifting  If you do not have access to weights, you can lift items around your house  Try to work all the major muscle groups, such as your legs, arms, abdomen, and chest  Do 2 or 3 sets on each area  Use a weight that is slightly heavier than you can lift easily  You can work up to [employer]  You can also use resistance bands instead of weights  Steps you can take to become more active:   Set goals  Set some long-term goals and some short-term goals  For example, you may want to be able to walk for 30 minutes without becoming short of breath  Try not to put time requirements on your goals  For example, do not think you should reach your goal in a month  Set smaller goals, such as walking a little longer each week, or feeling less shortness of breath  Be active all day  Activity does not have to mean structured exercise each day  You can be more active by making small changes all day  For example, try parking as far from the entrance of buildings as you can when you run errands  If possible, walk or ride a bike instead of driving  Take the stairs instead of the elevator  Keep a record of your activity and your progress  You can do this by writing down your daily activity  Include the kind of activity and how long you did it  You can also use a program on your phone or other device that will track activity for you  Also record your progress  You may be doing daily activities more easily, sleeping better, or building muscles      Step counting can help you monitor activity  A general guide is to take 10,000 steps each day  A pedometer is a device you can wear to track your steps  Some phones have programs that will count and record steps  You may need to work up to 10,000 steps  Start by finding out how many steps you usually take in a day  Then try to take more steps each day than you took the day before  Tips to help you stay on track:   Start slowly and work up  You do not have to do 30 minutes of activity at one time  You can break the activity up and do a few minutes at a time  Remember that some physical activity is better than none  Stand up during the day, even if you cannot walk around  Your body uses more energy when you stand  You may be able to get a desk that allows you to stand while you type or make phone calls for work  Aim for a speed or intensity that is challenging but not too difficult  You should be able to speak a few words at a time but not be able to sing  Plan activities you enjoy  Do a variety of activities so you do not become bored and you stay challenged  Include activities that strengthen your bones  These activities are called weight-bearing exercises  Examples include tennis, jumping rope, and running  Swimming, riding a bike, and similar exercises keep weight off your bones  They will not help strengthen bones, but they will help your heart and lungs work better  Ask for support from the people in your life  Go for a walk after dinner with your family  Meet friends at the park  Take a break with a coworker and walk around  Find someone who likes to go to the gym at the same time you do  You may be more likely to go if you know another person is counting on you  Get involved in community events, such as cleaning a community park  Ask someone to help you stay on track  For example, you can tell the person about your daily or weekly activity           Treat yourself to a reward when you reach a goal   The rewards can be for activity done for a certain amount of time each day or days each week  Rewards can also be for progress you make  Have rewards that are not food, such as a new clothing item or book  What you need to know about nutrition and activity:  Healthy foods will give you the energy you need to be active  Activity and good nutrition work together to help you reach or maintain a healthy weight  Healthy foods include fruits, vegetables, lean meats, fish, cooked beans, whole-grain breads, and low-fat dairy products  Your healthcare provider can help you create a healthy meal plan  He or she can tell you how many calories you need to stay active and still lose weight if needed  Follow up with your doctor as directed:  Write down your questions so you remember to ask them during your visits  © Copyright [employer] 2022 Information is for End User's use only and may not be sold, redistributed or otherwise used for commercial purposes  All illustrations and images included in CareNotes® are the copyrighted property of A D A M , Inc  or [facility] Claude sena   The above information is an  only  It is not intended as medical advice for individual conditions or treatments  Talk to your doctor, nurse or pharmacist before following any medical regimen to see if it is safe and effective for you  Leg Edema   AMBULATORY CARE:   Leg edema  is swelling caused by fluid buildup  Your legs may swell if you sit or stand for long periods of time, are pregnant, or are injured  Swelling may also occur if you have heart failure or circulation problems  This means that your heart does not pump blood through your body as it should  Call your local emergency number (911 in the [address] [place] [address],[address]) for any of the following: You cannot walk  You have chest pain or trouble breathing that is worse when you lie down  You suddenly feel lightheaded and have trouble breathing  You have new and sudden chest pain   You may have more pain when you take deep breaths or cough  You cough up blood  Seek care immediately if:   You feel faint or confused  Your skin turns blue or gray  Your leg feels warm, tender, and painful  It may be swollen and red  Call your doctor if:   You have a fever or feel more tired than usual     The veins in your legs look larger than usual  They may look full or bulging  Your legs itch or feel heavy  You have red or white areas or sores on your legs  The skin may also appear dimpled or have indentations  You are gaining weight  You have trouble moving your ankles  The swelling does not go away, or other parts of your body swell  You have questions or concerns about your condition or care  Self-care:   Elevate your legs  Raise your legs above the level of your heart as often as you can  This will help decrease swelling and pain  Prop your legs on pillows or blankets to keep them elevated comfortably  Wear pressure stockings, if directed  These tight stockings put pressure on your legs to promote blood flow and prevent blood clots  Put them on before you get out of bed  Wear the stockings during the day  Do not wear them while you sleep  Stay active  Do not stand or sit for long periods of time  Ask your healthcare provider about the best exercise plan for you  Eat healthy foods  Healthy foods include fruits, vegetables, whole-grain breads, low-fat dairy products, beans, lean meats, and fish  Ask if you need to be on a special diet  Limit sodium (salt)  Salt will make your body hold even more fluid  Your healthcare provider will tell you how many milligrams (mg) of salt you can have each day  Follow up with your doctor as directed:  Write down your questions so you remember to ask them during your visits    © Copyright [employer] 2022 Information is for End User's use only and may not be sold, redistributed or otherwise used for commercial purposes  All illustrations and images included in CareNotes® are the copyrighted property of A D A M [employer] Inc  or Brina Nelson  The above information is an  only  It is not intended as medical advice for individual conditions or treatments  Talk to your doctor, nurse or pharmacist before following any medical regimen to see if it is safe and effective for you

## 2022-12-06 NOTE — ASSESSMENT & PLAN NOTE
Ultrasound of the liver from 2019 shows hepatic steatosis  Ultrasound of the liver ordered for surveillance

## 2022-12-08 ENCOUNTER — HOSPITAL ENCOUNTER (EMERGENCY)
Facility: HOSPITAL | Age: 72
Discharge: HOME | End: 2022-12-08
Attending: EMERGENCY MEDICINE

## 2022-12-08 ENCOUNTER — APPOINTMENT (EMERGENCY)
Dept: NON INVASIVE DIAGNOSTICS | Facility: HOSPITAL | Age: 72
End: 2022-12-08

## 2022-12-08 ENCOUNTER — APPOINTMENT (EMERGENCY)
Dept: RADIOLOGY | Facility: HOSPITAL | Age: 72
End: 2022-12-08

## 2022-12-08 VITALS
DIASTOLIC BLOOD PRESSURE: 84 MMHG | SYSTOLIC BLOOD PRESSURE: 187 MMHG | OXYGEN SATURATION: 97 % | RESPIRATION RATE: 16 BRPM | TEMPERATURE: 98 F | HEART RATE: 66 BPM

## 2022-12-08 DIAGNOSIS — N12 PYELONEPHRITIS: ICD-10-CM

## 2022-12-08 DIAGNOSIS — N20.0 RENAL STONE: Primary | ICD-10-CM

## 2022-12-08 LAB
ALBUMIN SERPL BCP-MCNC: 3.5 G/DL (ref 3.5–5)
ALP SERPL-CCNC: 47 U/L (ref 46–116)
ALT SERPL W P-5'-P-CCNC: 20 U/L (ref 12–78)
ANION GAP SERPL CALCULATED.3IONS-SCNC: 5 MMOL/L (ref 4–13)
AST SERPL W P-5'-P-CCNC: 30 U/L (ref 5–45)
BACTERIA UR QL AUTO: ABNORMAL /HPF
BASOPHILS # BLD AUTO: 0.03 THOUSANDS/ÂΜL (ref 0–0.1)
BASOPHILS NFR BLD AUTO: 0 % (ref 0–1)
BILIRUB SERPL-MCNC: 0.62 MG/DL (ref 0.2–1)
BILIRUB UR QL STRIP: NEGATIVE
BUN SERPL-MCNC: 15 MG/DL (ref 5–25)
CALCIUM SERPL-MCNC: 8.9 MG/DL (ref 8.3–10.1)
CHLORIDE SERPL-SCNC: 109 MMOL/L (ref 96–108)
CLARITY UR: CLEAR
CO2 SERPL-SCNC: 25 MMOL/L (ref 21–32)
COLOR UR: ABNORMAL
CREAT SERPL-MCNC: 1.28 MG/DL (ref 0.6–1.3)
D DIMER PPP FEU-MCNC: 1.1 UG/ML FEU
EOSINOPHIL # BLD AUTO: 0.03 THOUSAND/ÂΜL (ref 0–0.61)
EOSINOPHIL NFR BLD AUTO: 0 % (ref 0–6)
ERYTHROCYTE [DISTWIDTH] IN BLOOD BY AUTOMATED COUNT: 13.3 % (ref 11.6–15.1)
GFR SERPL CREATININE-BSD FRML MDRD: 56 ML/MIN/1.73SQ M
GLUCOSE SERPL-MCNC: 155 MG/DL (ref 65–140)
GLUCOSE UR STRIP-MCNC: NEGATIVE MG/DL
HCT VFR BLD AUTO: 41.6 % (ref 36.5–49.3)
HGB BLD-MCNC: 13.6 G/DL (ref 12–17)
HGB UR QL STRIP.AUTO: ABNORMAL
IMM GRANULOCYTES # BLD AUTO: 0.03 THOUSAND/UL (ref 0–0.2)
IMM GRANULOCYTES NFR BLD AUTO: 0 % (ref 0–2)
KETONES UR STRIP-MCNC: NEGATIVE MG/DL
LEUKOCYTE ESTERASE UR QL STRIP: ABNORMAL
LIPASE SERPL-CCNC: 153 U/L (ref 73–393)
LYMPHOCYTES # BLD AUTO: 1.07 THOUSANDS/ÂΜL (ref 0.6–4.47)
LYMPHOCYTES NFR BLD AUTO: 16 % (ref 14–44)
MCH RBC QN AUTO: 26.6 PG (ref 26.8–34.3)
MCHC RBC AUTO-ENTMCNC: 32.7 G/DL (ref 31.4–37.4)
MCV RBC AUTO: 81 FL (ref 82–98)
MONOCYTES # BLD AUTO: 0.77 THOUSAND/ÂΜL (ref 0.17–1.22)
MONOCYTES NFR BLD AUTO: 11 % (ref 4–12)
MUCOUS THREADS UR QL AUTO: ABNORMAL
NEUTROPHILS # BLD AUTO: 4.83 THOUSANDS/ÂΜL (ref 1.85–7.62)
NEUTS SEG NFR BLD AUTO: 73 % (ref 43–75)
NITRITE UR QL STRIP: NEGATIVE
NON-SQ EPI CELLS URNS QL MICRO: ABNORMAL /HPF
NRBC BLD AUTO-RTO: 0 /100 WBCS
PH UR STRIP.AUTO: 6 [PH]
PLATELET # BLD AUTO: 268 THOUSANDS/UL (ref 149–390)
PMV BLD AUTO: 9.6 FL (ref 8.9–12.7)
POTASSIUM SERPL-SCNC: 4.4 MMOL/L (ref 3.5–5.3)
PROT SERPL-MCNC: 7.8 G/DL (ref 6.4–8.4)
PROT UR STRIP-MCNC: ABNORMAL MG/DL
RBC # BLD AUTO: 5.12 MILLION/UL (ref 3.88–5.62)
RBC #/AREA URNS AUTO: ABNORMAL /HPF
SODIUM SERPL-SCNC: 139 MMOL/L (ref 135–147)
SP GR UR STRIP.AUTO: 1.02 (ref 1–1.03)
UROBILINOGEN UR STRIP-ACNC: <2 MG/DL
WBC # BLD AUTO: 6.76 THOUSAND/UL (ref 4.31–10.16)
WBC #/AREA URNS AUTO: ABNORMAL /HPF

## 2022-12-08 RX ORDER — CEPHALEXIN 500 MG/1
500 CAPSULE ORAL EVERY 6 HOURS SCHEDULED
Qty: 28 CAPSULE | Refills: 0 | Status: SHIPPED | OUTPATIENT
Start: 2022-12-09 | End: 2022-12-16

## 2022-12-08 RX ORDER — KETOROLAC TROMETHAMINE 30 MG/ML
15 INJECTION, SOLUTION INTRAMUSCULAR; INTRAVENOUS ONCE
Status: COMPLETED | OUTPATIENT
Start: 2022-12-08 | End: 2022-12-08

## 2022-12-08 RX ADMIN — CEFTRIAXONE SODIUM 1000 MG: 10 INJECTION, POWDER, FOR SOLUTION INTRAVENOUS at 17:36

## 2022-12-08 RX ADMIN — IOHEXOL 100 ML: 350 INJECTION, SOLUTION INTRAVENOUS at 16:21

## 2022-12-08 RX ADMIN — KETOROLAC TROMETHAMINE 15 MG: 30 INJECTION, SOLUTION INTRAMUSCULAR; INTRAVENOUS at 13:50

## 2022-12-08 NOTE — ED PROCEDURE NOTE
Procedure  POC AAA US    Date/Time: 12/8/2022 1:55 PM  Performed by: Levi Ornelas DO  Authorized by: Levi Ornelas DO     Patient location:  ED  Performing Provider:  Resident  Other Assisting Provider: Yes (comment) (Dr Jennifer Lyle )    Procedure details:     Exam Type:  Educational    Indications: abdominal pain and flank pain      Views Obtained:  Transverse distal view and transverse mid view    Image quality: limited diagnostic      Image availability:  Images available in PACS  Findings:     Abdominal Aorta Findings: normal      Intra-abdominal fluid: not identified    Interpretation:     Aortic ultrasound impression: aorta normal    POC Renal US    Date/Time: 12/8/2022 1:55 PM  Performed by: Levi Ornelas DO  Authorized by: Levi Ornelas DO     Patient location:  ED  Performed by:  Resident  Other Assisting Provider: Yes (comment) (Dr Jennifer Lyle)    Procedure details:     Exam Type:  Therapeutic    Indications: flank/back pain and abdominal pain      Assessment for:  Suspected hydronephrosis    Views obtained: bladder (transverse and sagittal), left kidney and right kidney      Image quality: diagnostic      Image availability:  Images available in PACS  Findings:     LEFT kidney findings: unremarkable      LEFT hydronephrosis: none      RIGHT kidney findings: unremarkable      RIGHT hydronephrosis: none      Bladder:  Visualized  Interpretation:     Renal ultrasound impressions: normal exam                       Levi Ornelas DO  12/08/22 [id number]

## 2022-12-08 NOTE — ED ATTENDING ATTESTATION
Final Diagnosis:  1  Renal stone    2  Pyelonephritis      ED Course as of 12/11/22 0851   Thu Dec 08, 2022   1457 D-Dimer, Quant(!): 1 10       I, Khloe Rueda MD, saw and evaluated the patient  All available labs and X-rays were ordered by me or the resident and have been reviewed by myself  I discussed the patient with the resident / non-physician and agree with the resident's / non-physician practitioner's findings and plan as documented in the resident's / non-physician practicitioner's note, except where noted  At this point, I agree with the current assessment done in the ED  I was present during key portions of all procedures performed unless otherwise stated  Chief Complaint   Patient presents with   • Abdominal Pain     Pt c/o left sided abdominal pain radiating into left flank area x 1 week  Pt also c/o nausea      This is a 81 y o  male presenting for evaluation of LEFT-sided flank pain x1 week  It's not really pleuritic  No vomiting but feels some nausea  Denies any urinary tract infection symptoms (burning, itching, pain, blood, frequency)  No falls/trauma  No heavy lifting  PMH:   has a past medical history of Diabetes mellitus ([facility] [address] ), Hypertension, and Prediabetes  PSH:   has no past surgical history on file  Social:  Social History     Substance and Sexual Activity   Alcohol Use Yes   • Alcohol/week: 1 0 standard drink   • Types: 1 Standard drinks or equivalent per week     Social History     Tobacco Use   Smoking Status Never   Smokeless Tobacco Never     Social History     Substance and Sexual Activity   Drug Use No     PE:  Vitals:    12/08/22 1319 12/08/22 1630 12/08/22 1645   BP: 167/85 (!) 187/84    BP Location: Right arm     Pulse: 81 76 66   Resp: 18 16    Temp: 98 °F (36 7 °C)     TempSrc: Oral     SpO2: 94% 98% 97%   General: VSS, NAD, awake, alert  Well-nourished, well-developed  Appears stated age  Head: Normocephalic, atraumatic, nontender    Eyes: PERRL, EOM-I  No diplopia  No hyphema  No subconjunctival hemorrhages  Symmetrical lids  ENTAtraumatic external nose and ears  MMM  No stridor  Normal phonation  No drooling  Base of mouth is soft  No mastoid tenderness  Neck: Symmetric, trachea midline  No JVD  CV: Peripheral pulses +2 throughout  No chest wall tenderness  Lungs:   Unlabored   No retractions  No crepitus  No tachypnea  No paradoxical motion  Abd: +BS, soft, NT/ND    MSK:   FROM   Back:   LEFT CVAT  Skin: Dry, intact  Neuro: AAOx3, GCS 15, CN II-XII grossly intact  Motor grossly intact  Sensory grossly intact  Psychiatric/Behavioral: Appropriate mood and affect   Exam: deferred  A:  - LEFT flank pain   nausea  P:  - discussed imaging  - His LEFT leg is slightly edematous ? DVT? I guess it's possible  Will do d-dimer  - Given the discomfort, if d-dimer positive, will do CTA chest in addition to the belly  - 13 point ROS was performed and all are normal unless stated in the history above  - Nursing note reviewed  Vitals reviewed  - Orders placed by myself and/or advanced practitioner / resident     - Previous chart was reviewed  - No language barrier    - History obtained from patient  - There are no limitations to the history obtained  - Critical care time: Not applicable for this patient  Code Status: Prior  Advance Directive and Living Will:      Power of :    POLST:      Medications   ketorolac (TORADOL) injection 15 mg (15 mg Intravenous Given 12/8/22 1350)   iohexol (OMNIPAQUE) 350 MG/ML injection (SINGLE-DOSE) 100 mL (100 mL Intravenous Given 12/8/22 1621)   ceftriaxone (ROCEPHIN) 1 g/50 mL in dextrose IVPB (0 mg Intravenous Stopped 12/8/22 1905)     PE Study with CT abdomen &pelvis with contrast   Final Result         1  Findings suggestive of left pyelonephritis versus recent passage of a calculus  Nonobstructing 8 5 mm calculus in the dependent portion of the left renal pelvis     2  Constipation  No evidence of diverticulitis or colitis  3   No evidence of acute pulmonary embolus, thoracic aortic aneurysm or dissection  No acute cardiopulmonary process  Workstation performed: [id number]         VAS lower limb venous duplex study, unilateral/limited   Final Result        Orders Placed This Encounter   Procedures   • POC AAA US   • POC Renal US   • Urine culture   • PE Study with CT abdomen &pelvis with contrast   • CBC and differential   • Comprehensive metabolic panel   • Lipase   • UA w Reflex to Microscopic w Reflex to Culture   • D-dimer, quantitative   • Urine Microscopic     Labs Reviewed   CBC AND DIFFERENTIAL - Abnormal       Result Value Ref Range Status    WBC 6 76  4 31 - 10 16 Thousand/uL Final    RBC 5 12  3 88 - 5 62 Million/uL Final    Hemoglobin 13 6  12 0 - 17 0 g/dL Final    Hematocrit 41 6  36 5 - 49 3 % Final    MCV 81 (*) 82 - 98 fL Final    MCH 26 6 (*) 26 8 - 34 3 pg Final    MCHC 32 7  31 4 - 37 4 g/dL Final    RDW 13 3  11 6 - 15 1 % Final    MPV 9 6  8 9 - 12 7 fL Final    Platelets 546  632 - 390 Thousands/uL Final    nRBC 0  /100 WBCs Final    Neutrophils Relative 73  43 - 75 % Final    Immat GRANS % 0  0 - 2 % Final    Lymphocytes Relative 16  14 - 44 % Final    Monocytes Relative 11  4 - 12 % Final    Eosinophils Relative 0  0 - 6 % Final    Basophils Relative 0  0 - 1 % Final    Neutrophils Absolute 4 83  1 85 - 7 62 Thousands/µL Final    Immature Grans Absolute 0 03  0 00 - 0 20 Thousand/uL Final    Lymphocytes Absolute 1 07  0 60 - 4 47 Thousands/µL Final    Monocytes Absolute 0 77  0 17 - 1 22 Thousand/µL Final    Eosinophils Absolute 0 03  0 00 - 0 61 Thousand/µL Final    Basophils Absolute 0 03  0 00 - 0 10 Thousands/µL Final   COMPREHENSIVE METABOLIC PANEL - Abnormal    Sodium 139  135 - 147 mmol/L Final    Potassium 4 4  3 5 - 5 3 mmol/L Final    Comment: Slightly Hemolyzed;  Results May be Affected    Chloride 109 (*) 96 - 108 mmol/L Final    CO2 25  21 - 32 mmol/L Final    ANION GAP 5  4 - 13 mmol/L Final    BUN 15  5 - 25 mg/dL Final    Creatinine 1 28  0 60 - 1 30 mg/dL Final    Comment: Standardized to IDMS reference method    Glucose 155 (*) 65 - 140 mg/dL Final    Comment: If the patient is fasting, the ADA then defines impaired fasting glucose as > 100 mg/dL and diabetes as > or equal to 123 mg/dL  Specimen collection should occur prior to Sulfasalazine administration due to the potential for falsely depressed results  Specimen collection should occur prior to Sulfapyridine administration due to the potential for falsely elevated results  Calcium 8 9  8 3 - 10 1 mg/dL Final    AST 30  5 - 45 U/L Final    Comment: Slightly Hemolyzed; Results May be Affected  Specimen collection should occur prior to Sulfasalazine administration due to the potential for falsely depressed results  ALT 20  12 - 78 U/L Final    Comment: Specimen collection should occur prior to Sulfasalazine and/or Sulfapyridine administration due to the potential for falsely depressed results  Alkaline Phosphatase 47  46 - 116 U/L Final    Total Protein 7 8  6 4 - 8 4 g/dL Final    Albumin 3 5  3 5 - 5 0 g/dL Final    Total Bilirubin 0 62  0 20 - 1 00 mg/dL Final    Comment: Use of this assay is not recommended for patients undergoing treatment with eltrombopag due to the potential for falsely elevated results      eGFR 56  ml/min/1 73sq m Final    Narrative:     [place] guidelines for Chronic Kidney Disease (CKD):   •  Stage 1 with normal or high GFR (GFR > 90 mL/min/1 73 square meters)  •  Stage 2 Mild CKD (GFR = 60-89 mL/min/1 73 square meters)  •  Stage 3A Moderate CKD (GFR = 45-59 mL/min/1 73 square meters)  •  Stage 3B Moderate CKD (GFR = 30-44 mL/min/1 73 square meters)  •  Stage 4 Severe CKD (GFR = 15-29 mL/min/1 73 square meters)  •  Stage 5 End Stage CKD (GFR <15 mL/min/1 73 square meters)  Note: GFR calculation is accurate only with a steady state creatinine   UA W REFLEX TO MICROSCOPIC WITH REFLEX TO CULTURE - Abnormal    Color, UA Light Yellow   Final    Clarity, UA Clear   Final    Specific [place], UA 1 018  1 003 - 1 030 Final    pH, UA 6 0  4 5, 5 0, 5 5, 6 0, 6 5, 7 0, 7 5, 8 0 Final    Leukocytes, UA Small (*) Negative Final    Nitrite, UA Negative  Negative Final    Protein, UA Trace (*) Negative mg/dl Final    Glucose, UA Negative  Negative mg/dl Final    Ketones, UA Negative  Negative mg/dl Final    Urobilinogen, UA <2 0  <2 0 mg/dl mg/dl Final    Bilirubin, UA Negative  Negative Final    Occult Blood, UA Small (*) Negative Final   D-DIMER, QUANTITATIVE - Abnormal    D-Dimer, Quant 1 10 (*) <0 50 ug/ml FEU Final    Comment: Reference and upper limits to exclude DVT and PE are the same  Do not use to exclude if clinical symptoms are present  Narrative: In the evaluation for possible pulmonary embolism, in the appropriate (Well's Score of 4 or less) patient, the age adjusted d-dimer cutoff for this patient can be calculated as:    Age x 0 01 (in ug/mL) for Age-adjusted D-dimer exclusion threshold for a patient over 50 years     URINE MICROSCOPIC - Abnormal    RBC, UA 30-50 (*) None Seen, 1-2 /hpf Final    WBC, UA 4-10 (*) None Seen, 1-2 /hpf Final    Epithelial Cells Occasional  None Seen, Occasional /hpf Final    Bacteria, UA None Seen  None Seen, Occasional /hpf Final    MUCUS THREADS Occasional (*) None Seen Final   LIPASE - Normal    Lipase 153  73 - 393 u/L Final   URINE CULTURE     Time reflects when diagnosis was documented in both MDM as applicable and the Disposition within this note     Time User Action Codes Description Comment    12/8/2022  5:52 PM Josue Handsome Add [N20 0] Renal stone     12/8/2022  5:52 PM Josue Handsome Add [N12] Pyelonephritis       ED Disposition     ED Disposition   Discharge    Condition   Stable    Date/Time   u Dec 8, 2022  5:51 PM    Comment   Mimi Andrade discharge to home/self care                Follow-up Information     Follow up With Specialties Details Why Contact Info Additional [address] [facility] Schedule an appointment as soon as possible for a visit   [address] [facility] [id number]  [address] [place] [address] For Urology [facility], [address] [place] [address], [address]        Discharge Medication List as of 12/8/2022  6:20 PM      START taking these medications    Details   cephalexin (KEFLEX) 500 mg capsule Take 1 capsule (500 mg total) by mouth every 6 (six) hours for 7 days Do not start before December 9, 2022 , Starting Fri 12/9/2022, Until Fri 12/16/2022, Normal         CONTINUE these medications which have NOT CHANGED    Details   amLODIPine (NORVASC) 5 mg tablet Take 1 tablet (5 mg total) by mouth daily, Starting Tue 12/6/2022, Normal           No discharge procedures on file  Prior to Admission Medications   Prescriptions Last Dose Informant Patient Reported? Taking? amLODIPine (NORVASC) 5 mg tablet   No No   Sig: Take 1 tablet (5 mg total) by mouth daily      Facility-Administered Medications: None       Portions of the record may have been created with voice recognition software  Occasional wrong word or "sound a like" substitutions may have occurred due to the inherent limitations of voice recognition software  Read the chart carefully and recognize, using context, where substitutions have occurred      Electronically signed by:  Louise Trimble

## 2022-12-08 NOTE — ED PROVIDER NOTES
History  Chief Complaint   Patient presents with   • Abdominal Pain     Pt c/o left sided abdominal pain radiating into left flank area x 1 week  Pt also c/o nausea      HPI     63-year-old male with past medical history of diabetes and hypertension who presents for evaluation of left-sided flank pain  Patient states he has been having pain for the past week  He states pain was initially intermittent but since last night, pain has been constant  Patient states it feels like something is pushing on his side  Patient has had some nausea but no vomiting  Denies fevers or chills  Denies chest pain, shortness of breath, or cough  Denies worsening of pain with taking a deep breath  Patient has not taken anything for pain  Patient denies urinary symptoms  Denies testicular pain or swelling  Patient denies pain in his legs  Patient does have mild swelling in the left leg  Denies history of DVT or PE  Denies recent hospitalizations, surgeries, or prolonged immobilization  Denies any prior abdominal surgeries  Denies tobacco use or recreational drug use  He states he does drink alcohol occasionally  Prior to Admission Medications   Prescriptions Last Dose Informant Patient Reported? Taking? amLODIPine (NORVASC) 5 mg tablet   No No   Sig: Take 1 tablet (5 mg total) by mouth daily      Facility-Administered Medications: None       Past Medical History:   Diagnosis Date   • Diabetes mellitus ([facility] [address] )    • Hypertension    • Prediabetes        History reviewed  No pertinent surgical history      Family History   Problem Relation Age of Onset   • No Known Problems Mother    • No Known Problems Father    • Diabetes Sister    • Diabetes Brother    • No Known Problems Son    • No Known Problems Daughter    • No Known Problems Sister    • No Known Problems Sister    • No Known Problems Sister    • No Known Problems Sister    • No Known Problems Brother    • No Known Problems Brother    • No Known Problems Brother    • No Known Problems Son    • No Known Problems Son    • No Known Problems Daughter      I have reviewed and agree with the history as documented  E-Cigarette/Vaping   • E-Cigarette Use Never User      E-Cigarette/Vaping Substances   • Nicotine No    • THC No    • CBD No    • Flavoring No    • Other No    • Unknown No      Social History     Tobacco Use   • Smoking status: Never   • Smokeless tobacco: Never   Vaping Use   • Vaping Use: Never used   Substance Use Topics   • Alcohol use: Yes     Alcohol/week: 1 0 standard drink     Types: 1 Standard drinks or equivalent per week   • Drug use: No        Review of Systems   Constitutional: Negative for appetite change, chills and fever  HENT: Negative for congestion, rhinorrhea and sore throat  Respiratory: Negative for cough and shortness of breath  Cardiovascular: Negative for chest pain  Gastrointestinal: Positive for abdominal pain and diarrhea  Negative for blood in stool, constipation, nausea and vomiting  Genitourinary: Positive for flank pain  Negative for dysuria, frequency, hematuria and urgency  Musculoskeletal: Negative for arthralgias and myalgias  Skin: Negative for rash  Neurological: Negative for dizziness, weakness, light-headedness, numbness and headaches  All other systems reviewed and are negative        Physical Exam  ED Triage Vitals   Temperature Pulse Respirations Blood Pressure SpO2   12/08/22 1319 12/08/22 1319 12/08/22 1319 12/08/22 1319 12/08/22 1319   98 °F (36 7 °C) 81 18 167/85 94 %      Temp Source Heart Rate Source Patient Position - Orthostatic VS BP Location FiO2 (%)   12/08/22 1319 12/08/22 1319 12/08/22 1319 12/08/22 1319 --   Oral Monitor Lying Right arm       Pain Score       12/08/22 1317       8             Orthostatic Vital Signs  Vitals:    12/08/22 1319 12/08/22 1630 12/08/22 1645   BP: 167/85 (!) 187/84    Pulse: 81 76 66   Patient Position - Orthostatic VS: Lying         Physical Exam  Vitals and nursing note reviewed  Constitutional:       General: He is not in acute distress  Appearance: Normal appearance  He is well-developed and normal weight  He is not ill-appearing, toxic-appearing or diaphoretic  HENT:      Head: Normocephalic and atraumatic  Right Ear: External ear normal       Left Ear: External ear normal       Nose: Nose normal       Mouth/Throat:      Mouth: Mucous membranes are moist       Pharynx: Oropharynx is clear  Eyes:      Extraocular Movements: Extraocular movements intact  Conjunctiva/sclera: Conjunctivae normal    Cardiovascular:      Rate and Rhythm: Normal rate and regular rhythm  Pulses: Normal pulses  Heart sounds: Normal heart sounds  No murmur heard  No friction rub  No gallop  Pulmonary:      Effort: Pulmonary effort is normal  No respiratory distress  Breath sounds: Normal breath sounds  No wheezing or rales  Abdominal:      General: There is no distension  Palpations: Abdomen is soft  Tenderness: There is abdominal tenderness in the left upper quadrant and left lower quadrant  There is left CVA tenderness  There is no right CVA tenderness, guarding or rebound  Musculoskeletal:         General: No tenderness  Cervical back: Neck supple  Right lower leg: No edema  Left lower leg: Edema present  Comments: Mild LLE swelling  Skin:     General: Skin is warm and dry  Coloration: Skin is not pale  Findings: No rash  Neurological:      General: No focal deficit present  Mental Status: He is alert and oriented to person, place, and time  Cranial Nerves: No cranial nerve deficit  Sensory: No sensory deficit  Motor: No weakness     Psychiatric:         Mood and Affect: Mood normal          Behavior: Behavior normal          ED Medications  Medications   ketorolac (TORADOL) injection 15 mg (15 mg Intravenous Given 12/8/22 1350)   iohexol (OMNIPAQUE) 350 MG/ML injection (SINGLE-DOSE) 100 mL (100 mL Intravenous Given 12/8/22 1621)   ceftriaxone (ROCEPHIN) 1 g/50 mL in dextrose IVPB (0 mg Intravenous Stopped 12/8/22 1905)       Diagnostic Studies  Results Reviewed     Procedure Component Value Units Date/Time    Urine culture [[id number]]     Lab Status: No result Specimen: Urine     D-dimer, quantitative [[id number]]  (Abnormal) Collected: 12/08/22 1352    Lab Status: Final result Specimen: Blood from Arm, Left Updated: 12/08/22 1454     D-Dimer, Quant 1 10 ug/ml FEU     Narrative: In the evaluation for possible pulmonary embolism, in the appropriate (Well's Score of 4 or less) patient, the age adjusted d-dimer cutoff for this patient can be calculated as:    Age x 0 01 (in ug/mL) for Age-adjusted D-dimer exclusion threshold for a patient over 50 years      Comprehensive metabolic panel [[id number]]  (Abnormal) Collected: 12/08/22 1342    Lab Status: Final result Specimen: Blood from Arm, Left Updated: 12/08/22 1451     Sodium 139 mmol/L      Potassium 4 4 mmol/L      Chloride 109 mmol/L      CO2 25 mmol/L      ANION GAP 5 mmol/L      BUN 15 mg/dL      Creatinine 1 28 mg/dL      Glucose 155 mg/dL      Calcium 8 9 mg/dL      AST 30 U/L      ALT 20 U/L      Alkaline Phosphatase 47 U/L      Total Protein 7 8 g/dL      Albumin 3 5 g/dL      Total Bilirubin 0 62 mg/dL      eGFR 56 ml/min/1 73sq m     Narrative:      [facility] guidelines for Chronic Kidney Disease (CKD):   •  Stage 1 with normal or high GFR (GFR > 90 mL/min/1 73 square meters)  •  Stage 2 Mild CKD (GFR = 60-89 mL/min/1 73 square meters)  •  Stage 3A Moderate CKD (GFR = 45-59 mL/min/1 73 square meters)  •  Stage 3B Moderate CKD (GFR = 30-44 mL/min/1 73 square meters)  •  Stage 4 Severe CKD (GFR = 15-29 mL/min/1 73 square meters)  •  Stage 5 End Stage CKD (GFR <15 mL/min/1 73 square meters)  Note: GFR calculation is accurate only with a steady state creatinine    Lipase [[id number]]  (Normal) Collected: 12/08/22 8060 Lab Status: Final result Specimen: Blood from Arm, Left Updated: 12/08/22 1451     Lipase 153 u/L     Urine Microscopic [[id number]]  (Abnormal) Collected: 12/08/22 1358    Lab Status: Final result Specimen: Urine, Clean Catch Updated: 12/08/22 1445     RBC, UA 30-50 /hpf      WBC, UA 4-10 /hpf      Epithelial Cells Occasional /hpf      Bacteria, UA None Seen /hpf      MUCUS THREADS Occasional    UA w Reflex to Microscopic w Reflex to Culture [[id number]]  (Abnormal) Collected: 12/08/22 1358    Lab Status: Final result Specimen: Urine, Clean Catch Updated: 12/08/22 1432     Color, UA Light Yellow     Clarity, UA Clear     Specific Gravity, UA 1 018     pH, UA 6 0     Leukocytes, UA Small     Nitrite, UA Negative     Protein, UA Trace mg/dl      Glucose, UA Negative mg/dl      Ketones, UA Negative mg/dl      Urobilinogen, UA <2 0 mg/dl      Bilirubin, UA Negative     Occult Blood, UA Small    CBC and differential [[id number]]  (Abnormal) Collected: 12/08/22 1349    Lab Status: Final result Specimen: Blood from Arm, Left Updated: 12/08/22 1414     WBC 6 76 Thousand/uL      RBC 5 12 Million/uL      Hemoglobin 13 6 g/dL      Hematocrit 41 6 %      MCV 81 fL      MCH 26 6 pg      MCHC 32 7 g/dL      RDW 13 3 %      MPV 9 6 fL      Platelets 337 Thousands/uL      nRBC 0 /100 WBCs      Neutrophils Relative 73 %      Immat GRANS % 0 %      Lymphocytes Relative 16 %      Monocytes Relative 11 %      Eosinophils Relative 0 %      Basophils Relative 0 %      Neutrophils Absolute 4 83 Thousands/µL      Immature Grans Absolute 0 03 Thousand/uL      Lymphocytes Absolute 1 07 Thousands/µL      Monocytes Absolute 0 77 Thousand/µL      Eosinophils Absolute 0 03 Thousand/µL      Basophils Absolute 0 03 Thousands/µL                  PE Study with CT abdomen &pelvis with contrast   Final Result by Miri Elizalde MD (12/08 1711)         1  Findings suggestive of left pyelonephritis versus recent passage of a calculus  Nonobstructing 8 5 mm calculus in the dependent portion of the left renal pelvis  2   Constipation  No evidence of diverticulitis or colitis  3   No evidence of acute pulmonary embolus, thoracic aortic aneurysm or dissection  No acute cardiopulmonary process  Workstation performed: [id number]         VAS lower limb venous duplex study, unilateral/limited   Final Result by Willie Berry MD (12/08 1721)            Procedures  Procedures      ED Course               Identification of Seniors at [address] [place] [address] Most Recent Value   (ISAR) Identification of Seniors at Risk    Before the illness or injury that brought you to the Emergency, did you need someone to help you on a regular basis? 0 Filed at: 12/08/2022 1318   In the last 24 hours, have you needed more help than usual? 0 Filed at: 12/08/2022 1318   Have you been hospitalized for one or more nights during the past 6 months? 0 Filed at: 12/08/2022 1318   In general, do you see well? 0 Filed at: 12/08/2022 1318   In general, do you have serious problems with your memory? 0 Filed at: 12/08/2022 1318   Do you take more than three different medications every day? 1 Filed at: 12/08/2022 1318   ISAR Score 1 Filed at: 12/08/2022 1318                    SBIRT 20yo+    Flowsheet Row Most Recent Value   SBIRT (25 yo +)    In order to provide better care to our patients, we are screening all of our patients for alcohol and drug use  Would it be okay to ask you these screening questions? No Filed at: 12/08/2022 1705                MDM     77-year-old male with past medical history of diabetes and hypertension who presents for evaluation of left-sided flank pain for the past week, he has had associated nausea but no vomiting  No other associated symptoms  Patient does have tenderness in the left flank and left side of the abdomen    Differential diagnosis includes: Pyelonephritis, renal colic, aortic pathology, diverticulitis, renal infarct, PE    Will obtain CBC, CMP, lipase, UA, and D-dimer  Will obtain CT scan, pending D-dimer  If D-dimer negative, will obtain CT abdomen pelvis  If positive, will obtain CTA PE study with abdomen pelvis  Will treat with toradol  Reviewed labs, no marked abnormalities on CBC or CMP  D-dimer positive  Will order CTA PE study with abdomen pelvis  Will also order venous duplex of the left lower extremity  Duplex negative for DVT  CTA PE study shows stranding around the left kidney, possible pyelonephritis versus recent passed stone  He does have stone in the renal pelvis without any evidence of obstruction  UA shows RBCs along with WBCs, no bacteria  Given flank pain, will treat with antibiotics for pyelonephritis  Will give 1 dose of ceftriaxone here and placed on Keflex for 7 days  Patient states flank pain is improved  Will have patient follow-up with urology  Discussed with patient strict return precautions  Patient expressed understanding and was agreeable for discharge  Disposition  Final diagnoses:   Renal stone   Pyelonephritis     Time reflects when diagnosis was documented in both MDM as applicable and the Disposition within this note     Time User Action Codes Description Comment    12/8/2022  5:52 PM Jesi Tucker Add [N20 0] Renal stone     12/8/2022  5:52 PM Jesi Tucker Add [N12] Pyelonephritis       ED Disposition     ED Disposition   Discharge    Condition   Stable    Date/Time   Thu Dec 8, 2022  5:51 PM    Comment   Helena Gunter discharge to home/self care                 Follow-up Information     Follow up With Specialties Details Why Contact Info Additional [address] [facility] Schedule an appointment as soon as possible for a visit   [address] [place] [phone]  [address] [place] [address] For Urology [facility], [address] [place] [address], [address]          Discharge Medication List as of 12/8/2022  6:20 PM      START taking these medications    Details   cephalexin (KEFLEX) 500 mg capsule Take 1 capsule (500 mg total) by mouth every 6 (six) hours for 7 days Do not start before December 9, 2022 , Starting Fri 12/9/2022, Until Fri 12/16/2022, Normal         CONTINUE these medications which have NOT CHANGED    Details   amLODIPine (NORVASC) 5 mg tablet Take 1 tablet (5 mg total) by mouth daily, Starting Tue 12/6/2022, Normal           No discharge procedures on file  PDMP Review     None           ED Provider  Attending physically available and evaluated Karin Valerio JONES managed the patient along with the ED Attending      Electronically Signed by         Sunny Velázquez MD  12/09/22 3031

## 2022-12-15 ENCOUNTER — TELEPHONE (OUTPATIENT)
Dept: OTHER | Facility: OTHER | Age: 72
End: 2022-12-15

## 2022-12-15 NOTE — TELEPHONE ENCOUNTER
Pt called in stating he needs to schedule a follow up appt   with the urology office  Pt is requesting a call back after 12pm during the week  Please advise

## 2022-12-16 ENCOUNTER — TELEPHONE (OUTPATIENT)
Dept: FAMILY MEDICINE CLINIC | Facility: CLINIC | Age: 72
End: 2022-12-16

## 2022-12-16 DIAGNOSIS — E11.9 TYPE 2 DIABETES, DIET CONTROLLED (HCC): Primary | ICD-10-CM

## 2022-12-16 NOTE — TELEPHONE ENCOUNTER
Patient is requesting a physician's order for an Ophthalmologist - states when he went to [facility] last year they noted the beginning of cataracts  Please call patient at [phone] with the contact information

## 2022-12-21 ENCOUNTER — OFFICE VISIT (OUTPATIENT)
Dept: UROLOGY | Facility: AMBULATORY SURGERY CENTER | Age: 72
End: 2022-12-21

## 2022-12-21 VITALS
BODY MASS INDEX: 29.88 KG/M2 | HEART RATE: 68 BPM | OXYGEN SATURATION: 98 % | HEIGHT: 64 IN | SYSTOLIC BLOOD PRESSURE: 148 MMHG | DIASTOLIC BLOOD PRESSURE: 72 MMHG | WEIGHT: 175 LBS

## 2022-12-21 DIAGNOSIS — N20.0 KIDNEY STONE: Primary | ICD-10-CM

## 2022-12-21 DIAGNOSIS — R31.0 GROSS HEMATURIA: ICD-10-CM

## 2022-12-21 DIAGNOSIS — Z12.5 PROSTATE CANCER SCREENING: ICD-10-CM

## 2022-12-21 LAB
SL AMB  POCT GLUCOSE, UA: ABNORMAL
SL AMB LEUKOCYTE ESTERASE,UA: ABNORMAL
SL AMB POCT BILIRUBIN,UA: ABNORMAL
SL AMB POCT BLOOD,UA: ABNORMAL
SL AMB POCT CLARITY,UA: CLEAR
SL AMB POCT COLOR,UA: YELLOW
SL AMB POCT KETONES,UA: ABNORMAL
SL AMB POCT NITRITE,UA: ABNORMAL
SL AMB POCT PH,UA: 5
SL AMB POCT SPECIFIC GRAVITY,UA: 1.02
SL AMB POCT URINE PROTEIN: ABNORMAL
SL AMB POCT UROBILINOGEN: 0.2

## 2022-12-21 NOTE — PROGRESS NOTES
12/21/2022    Assessment and Plan    81 y o  male managed by Dr Ulices Gonzalez    1  Gross hematuria  · S/p cystoscopy 3/2021 with no evidence of malignancy  · Urine dip in office positive for large blood otherwise unremarkable    2  Prostate cancer screening  · PSA performed prior to office visit  · JACK  · Repeat PSA and JACK in 1 year      History of Present Illness  J Carlos Grayson is a 81 y o  male here for follow up evaluation of gross hematuria  Patient also is here for routine prostate cancer screening  He is status post cystoscopy 3/2021 with no evidence of malignancy  Reports since that time of cystoscopy he has had no further episodes of gross hematuria  He currently denies auditory and or tract symptoms  He reports sensation of complete bladder with urination  Denies a family history of prostate cancer  Denies changes to his general health since his last office evaluation  Review of Systems   Constitutional: Negative for chills and fever  Respiratory: Negative for cough and shortness of breath  Cardiovascular: Negative for chest pain  Gastrointestinal: Negative for abdominal distention, abdominal pain, blood in stool, nausea and vomiting  Genitourinary: Negative for difficulty urinating, dysuria, enuresis, flank pain, frequency, hematuria and urgency  Skin: Negative for rash  Past Medical History  Past Medical History:   Diagnosis Date   • Diabetes mellitus ([facility] [address] )    • Hypertension    • Prediabetes        Past Social History  History reviewed  No pertinent surgical history    Social History     Tobacco Use   Smoking Status Never   Smokeless Tobacco Never       Past Family History  Family History   Problem Relation Age of Onset   • No Known Problems Mother    • No Known Problems Father    • Diabetes Sister    • Diabetes Brother    • No Known Problems Son    • No Known Problems Daughter    • No Known Problems Sister    • No Known Problems Sister    • No Known Problems Sister    • No Known Problems Sister    • No Known Problems Brother    • No Known Problems Brother    • No Known Problems Brother    • No Known Problems Son    • No Known Problems Son    • No Known Problems Daughter        Past Social history  Social History     Socioeconomic History   • Marital status: /Civil Union     Spouse name: Not on file   • Number of children: Not on file   • Years of education: Not on file   • Highest education level: Not on file   Occupational History   • Not on file   Tobacco Use   • Smoking status: Never   • Smokeless tobacco: Never   Vaping Use   • Vaping Use: Never used   Substance and Sexual Activity   • Alcohol use: Yes     Alcohol/week: 1 0 standard drink     Types: 1 Standard drinks or equivalent per week   • Drug use: No   • Sexual activity: Never   Other Topics Concern   • Not on file   Social History Narrative   • Not on file     Social Determinants of Health     Financial Resource Strain: Not on file   Food Insecurity: Not on file   Transportation Needs: Not on file   Physical Activity: Not on file   Stress: Not on file   Social Connections: Not on file   Intimate Partner Violence: Not on file   Housing Stability: Not on file       Current Medications  Current Outpatient Medications   Medication Sig Dispense Refill   • amLODIPine (NORVASC) 5 mg tablet Take 1 tablet (5 mg total) by mouth daily 90 tablet 0     No current facility-administered medications for this visit  Allergies  No Known Allergies      The following portions of the patient's history were reviewed and updated as appropriate: allergies, current medications, past medical history, past social history, past surgical history and problem list       Vitals  Vitals:    12/21/22 0935   BP: 148/72   BP Location: Right arm   Patient Position: Sitting   Pulse: 68   SpO2: 98%   Weight: 79 4 kg (175 lb)   Height: 5' 4" (1 626 m)           Physical Exam  Physical Exam  Vitals reviewed     Constitutional: General: He is not in acute distress  Appearance: Normal appearance  He is normal weight  HENT:      Head: Normocephalic  Pulmonary:      Effort: No respiratory distress  Breath sounds: Normal breath sounds  Genitourinary:     Penis: Normal        Testes: Normal       Comments: JACK-prostate greater than 50 g with no nodules  Smooth and symmetrical  Skin:     General: Skin is warm and dry  Neurological:      General: No focal deficit present  Mental Status: He is alert and oriented to person, place, and time  Psychiatric:         Mood and Affect: Mood normal          Behavior: Behavior normal            Results  No results found for this or any previous visit (from the past 1 hour(s)) ]  Lab Results   Component Value Date    PSA 2 2 12/13/2018     Lab Results   Component Value Date    CALCIUM 8 9 12/08/2022    K 4 4 12/08/2022    CO2 25 12/08/2022     (H) 12/08/2022    BUN 15 12/08/2022    CREATININE 1 28 12/08/2022     Lab Results   Component Value Date    WBC 6 76 12/08/2022    HGB 13 6 12/08/2022    HCT 41 6 12/08/2022    MCV 81 (L) 12/08/2022     12/08/2022           Orders  Orders Placed This Encounter   Procedures   • PSA, Total Screen     This is a patient instruction: This test is non-fasting  Please drink two glasses of water morning of bloodwork          Standing Status:   Future     Standing Expiration Date:   6/21/2024   • POCT urine dip       DORIAN Clinton

## 2022-12-23 ENCOUNTER — APPOINTMENT (OUTPATIENT)
Dept: LAB | Facility: CLINIC | Age: 72
End: 2022-12-23

## 2022-12-23 DIAGNOSIS — Z12.5 PROSTATE CANCER SCREENING: ICD-10-CM

## 2022-12-23 DIAGNOSIS — I10 ESSENTIAL HYPERTENSION: Chronic | ICD-10-CM

## 2022-12-23 DIAGNOSIS — E11.65 TYPE 2 DIABETES MELLITUS WITH HYPERGLYCEMIA, WITHOUT LONG-TERM CURRENT USE OF INSULIN (HCC): ICD-10-CM

## 2022-12-23 DIAGNOSIS — E78.00 PURE HYPERCHOLESTEROLEMIA: ICD-10-CM

## 2022-12-23 LAB
ANION GAP SERPL CALCULATED.3IONS-SCNC: 4 MMOL/L (ref 4–13)
BASOPHILS # BLD AUTO: 0.03 THOUSANDS/ÂΜL (ref 0–0.1)
BASOPHILS NFR BLD AUTO: 1 % (ref 0–1)
BUN SERPL-MCNC: 7 MG/DL (ref 5–25)
CALCIUM SERPL-MCNC: 9.3 MG/DL (ref 8.3–10.1)
CHLORIDE SERPL-SCNC: 107 MMOL/L (ref 96–108)
CHOLEST SERPL-MCNC: 175 MG/DL
CO2 SERPL-SCNC: 30 MMOL/L (ref 21–32)
CREAT SERPL-MCNC: 0.99 MG/DL (ref 0.6–1.3)
CREAT UR-MCNC: 154 MG/DL
EOSINOPHIL # BLD AUTO: 0.06 THOUSAND/ÂΜL (ref 0–0.61)
EOSINOPHIL NFR BLD AUTO: 2 % (ref 0–6)
ERYTHROCYTE [DISTWIDTH] IN BLOOD BY AUTOMATED COUNT: 13.2 % (ref 11.6–15.1)
EST. AVERAGE GLUCOSE BLD GHB EST-MCNC: 140 MG/DL
GFR SERPL CREATININE-BSD FRML MDRD: 76 ML/MIN/1.73SQ M
GLUCOSE P FAST SERPL-MCNC: 91 MG/DL (ref 65–99)
HBA1C MFR BLD: 6.5 %
HCT VFR BLD AUTO: 43.9 % (ref 36.5–49.3)
HDLC SERPL-MCNC: 53 MG/DL
HGB BLD-MCNC: 13.9 G/DL (ref 12–17)
IMM GRANULOCYTES # BLD AUTO: 0.01 THOUSAND/UL (ref 0–0.2)
IMM GRANULOCYTES NFR BLD AUTO: 0 % (ref 0–2)
LDLC SERPL CALC-MCNC: 98 MG/DL (ref 0–100)
LYMPHOCYTES # BLD AUTO: 1.73 THOUSANDS/ÂΜL (ref 0.6–4.47)
LYMPHOCYTES NFR BLD AUTO: 42 % (ref 14–44)
MCH RBC QN AUTO: 25.8 PG (ref 26.8–34.3)
MCHC RBC AUTO-ENTMCNC: 31.7 G/DL (ref 31.4–37.4)
MCV RBC AUTO: 81 FL (ref 82–98)
MICROALBUMIN UR-MCNC: 15 MG/L (ref 0–20)
MICROALBUMIN/CREAT 24H UR: 10 MG/G CREATININE (ref 0–30)
MONOCYTES # BLD AUTO: 0.51 THOUSAND/ÂΜL (ref 0.17–1.22)
MONOCYTES NFR BLD AUTO: 12 % (ref 4–12)
NEUTROPHILS # BLD AUTO: 1.78 THOUSANDS/ÂΜL (ref 1.85–7.62)
NEUTS SEG NFR BLD AUTO: 43 % (ref 43–75)
NRBC BLD AUTO-RTO: 0 /100 WBCS
PLATELET # BLD AUTO: 255 THOUSANDS/UL (ref 149–390)
PMV BLD AUTO: 9.9 FL (ref 8.9–12.7)
POTASSIUM SERPL-SCNC: 4.4 MMOL/L (ref 3.5–5.3)
PSA SERPL-MCNC: 6.2 NG/ML (ref 0–4)
RBC # BLD AUTO: 5.39 MILLION/UL (ref 3.88–5.62)
SODIUM SERPL-SCNC: 141 MMOL/L (ref 135–147)
TRIGL SERPL-MCNC: 120 MG/DL
WBC # BLD AUTO: 4.12 THOUSAND/UL (ref 4.31–10.16)

## 2022-12-28 ENCOUNTER — TELEPHONE (OUTPATIENT)
Dept: FAMILY MEDICINE CLINIC | Facility: CLINIC | Age: 72
End: 2022-12-28

## 2022-12-28 ENCOUNTER — TELEPHONE (OUTPATIENT)
Dept: UROLOGY | Facility: AMBULATORY SURGERY CENTER | Age: 72
End: 2022-12-28

## 2022-12-28 DIAGNOSIS — R97.20 ELEVATED PSA: Primary | ICD-10-CM

## 2022-12-28 NOTE — TELEPHONE ENCOUNTER
Please notify patient that we need to repeat his PSA  His most recent level came back elevated at 6 2  This is a huge jump from prior evaluation    I would like to have his PSA repeated in 6 weeks

## 2022-12-28 NOTE — TELEPHONE ENCOUNTER
Spoke to patient to advise of AP's note  He is understanding  Confirmed patient does go to a [facility] lab  Advised if he would like the script mailed and address was confirmed

## 2022-12-28 NOTE — TELEPHONE ENCOUNTER
Patient ([phone]) called to report going to ER on 12/8/22 after visit with Lex Teresa on 12/6/22  Reports provider had lab work ordered and that labs had been done at hospital  Patient asked to make provider aware to review results and contact patient if any further testing is needed

## 2023-01-03 ENCOUNTER — RA CDI HCC (OUTPATIENT)
Dept: OTHER | Facility: HOSPITAL | Age: 73
End: 2023-01-03

## 2023-01-03 NOTE — PROGRESS NOTES
[facility]  coding opportunities    [address] [id number]      Chart Reviewed number of suggestions sent to Provider: 1     Patients Insurance     Medicare Insurance: [employer]

## 2023-01-06 LAB — COLOGUARD RESULT REPORTABLE: NEGATIVE

## 2023-01-11 ENCOUNTER — OFFICE VISIT (OUTPATIENT)
Dept: FAMILY MEDICINE CLINIC | Facility: CLINIC | Age: 73
End: 2023-01-11

## 2023-01-11 VITALS
TEMPERATURE: 97.2 F | DIASTOLIC BLOOD PRESSURE: 76 MMHG | RESPIRATION RATE: 16 BRPM | HEIGHT: 64 IN | BODY MASS INDEX: 29.71 KG/M2 | HEART RATE: 51 BPM | SYSTOLIC BLOOD PRESSURE: 132 MMHG | WEIGHT: 174 LBS

## 2023-01-11 DIAGNOSIS — E11.9 TYPE 2 DIABETES, DIET CONTROLLED (HCC): ICD-10-CM

## 2023-01-11 DIAGNOSIS — N18.2 STAGE 2 CHRONIC KIDNEY DISEASE: ICD-10-CM

## 2023-01-11 DIAGNOSIS — E78.00 PURE HYPERCHOLESTEROLEMIA: ICD-10-CM

## 2023-01-11 DIAGNOSIS — I10 ESSENTIAL HYPERTENSION: Chronic | ICD-10-CM

## 2023-01-11 DIAGNOSIS — E66.3 OVERWEIGHT: Chronic | ICD-10-CM

## 2023-01-11 DIAGNOSIS — Z00.00 MEDICARE ANNUAL WELLNESS VISIT, SUBSEQUENT: Primary | ICD-10-CM

## 2023-01-11 DIAGNOSIS — I73.9 PERIPHERAL VASCULAR DISEASE (HCC): ICD-10-CM

## 2023-01-11 NOTE — ASSESSMENT & PLAN NOTE
Lab Results   Component Value Date    EGFR 76 12/23/2022    EGFR 56 12/08/2022    EGFR 91 03/04/2021    CREATININE 0 99 12/23/2022    CREATININE 1 28 12/08/2022    CREATININE 0 98 03/04/2021   Recent blood work reviewed with the patient  Kidney function somewhat improved from previous  Nephrotoxins discussed

## 2023-01-11 NOTE — ASSESSMENT & PLAN NOTE
Recent lipid panel normal   The patient is not interested in a statin      Component      Latest Ref Rng & Units 12/23/2022   Cholesterol      See Comment mg/dL 175   Triglycerides      See Comment mg/dL 120   HDL      >=40 mg/dL 53   LDL Calculated      0 - 100 mg/dL 98

## 2023-01-11 NOTE — ASSESSMENT & PLAN NOTE
The patient continues on his Norvasc 5 mg daily  His blood pressure in the office today is 132/76  Low-salt diet recommended

## 2023-01-11 NOTE — ASSESSMENT & PLAN NOTE
Lab Results   Component Value Date    HGBA1C 6 5 (H) 12/23/2022   Most recent hemoglobin A1c is 6 5  The patient continues to be diet controlled  He recently had a diabetic eye exam and we will contact his ophthalmologist just to obtain that result    Up to date with diabetic foot exams

## 2023-01-11 NOTE — PROGRESS NOTES
Assessment and Plan:     Problem List Items Addressed This Visit        Endocrine    Type 2 diabetes, diet controlled ([address] )       Lab Results   Component Value Date    HGBA1C 6 5 (H) 12/23/2022   Most recent hemoglobin A1c is 6 5  The patient continues to be diet controlled  He recently had a diabetic eye exam and we will contact his ophthalmologist just to obtain that result  Up to date with diabetic foot exams         Relevant Orders    HEMOGLOBIN A1C W/ EAG ESTIMATION       Cardiovascular and Mediastinum    Essential hypertension (Chronic)     The patient continues on his Norvasc 5 mg daily  His blood pressure in the office today is 132/76  Low-salt diet recommended  Peripheral vascular disease (HCC)       Genitourinary    Stage 2 chronic kidney disease     Lab Results   Component Value Date    EGFR 76 12/23/2022    EGFR 56 12/08/2022    EGFR 91 03/04/2021    CREATININE 0 99 12/23/2022    CREATININE 1 28 12/08/2022    CREATININE 0 98 03/04/2021   Recent blood work reviewed with the patient  Kidney function somewhat improved from previous  Nephrotoxins discussed  Relevant Orders    Comprehensive metabolic panel       Other    Overweight (Chronic)     Lifestyle modifications discussed  Weight loss recommended  Pure hypercholesterolemia     Recent lipid panel normal   The patient is not interested in a statin  Component      Latest Ref Rng & Units 12/23/2022   Cholesterol      See Comment mg/dL 175   Triglycerides      See Comment mg/dL 120   HDL      >=40 mg/dL 53   LDL Calculated      0 - 100 mg/dL 98           Other Visit Diagnoses     Medicare annual wellness visit, subsequent    -  Primary        BMI Counseling: Body mass index is 29 87 kg/m²   The BMI is above normal  Nutrition recommendations include decreasing portion sizes, limiting drinks that contain sugar, moderation in carbohydrate intake, increasing intake of lean protein, reducing intake of saturated and trans fat and reducing intake of cholesterol  Exercise recommendations include moderate physical activity 150 minutes/week and exercising 3-5 times per week  Rationale for BMI follow-up plan is due to patient being overweight or obese  Depression Screening and Follow-up Plan: Patient was screened for depression during today's encounter  They screened negative with a PHQ-2 score of 0  Preventive health issues were discussed with patient, and age appropriate screening tests were ordered as noted in patient's After Visit Summary  Personalized health advice and appropriate referrals for health education or preventive services given if needed, as noted in patient's After Visit Summary  History of Present Illness:     Patient presents for a Medicare Wellness Visit    Patient presents to the office today for a Medicare wellness visit and follow-up  Overall the patient feels well  Recent blood work was reviewed with the patient  Patient has declined an pneumonia vaccine, COVID-vaccine and flu vaccine  He recently had a diabetic eye exam and we will obtain those results  Hemoglobin A1c ordered for 3 months  I will see him back at that time  Patient Care Team:  Yue Hmam as PCP - General (Internal Medicine)     Review of Systems:     Review of Systems   Constitutional: Negative  Negative for fatigue  HENT: Negative  Negative for congestion, postnasal drip, rhinorrhea and trouble swallowing  Eyes: Negative  Negative for visual disturbance  Respiratory: Negative  Negative for choking and shortness of breath  Cardiovascular: Negative  Negative for chest pain  Gastrointestinal: Negative  Endocrine: Negative  Genitourinary: Negative  Musculoskeletal: Negative  Negative for arthralgias, back pain, myalgias and neck pain  Skin: Negative  Neurological: Negative for dizziness and headaches  Psychiatric/Behavioral: Negative           Problem List:     Patient Active Problem List Diagnosis   • Hepatic steatosis   • Essential hypertension   • First degree AV block   • Type 2 diabetes, diet controlled (HCC)   • Sigmoid diverticulosis   • Overweight   • Tremor of both hands   • History of COVID-19   • Pure hypercholesterolemia   • Cataracts, bilateral   • Peripheral vascular disease ([facility] )   • Stage 2 chronic kidney disease      Past Medical and Surgical History:     Past Medical History:   Diagnosis Date   • Diabetes mellitus ([facility] )    • Hypertension    • Prediabetes      No past surgical history on file  Family History:     Family History   Problem Relation Age of Onset   • No Known Problems Mother    • No Known Problems Father    • Diabetes Sister    • Diabetes Brother    • No Known Problems Son    • No Known Problems Daughter    • No Known Problems Sister    • No Known Problems Sister    • No Known Problems Sister    • No Known Problems Sister    • No Known Problems Brother    • No Known Problems Brother    • No Known Problems Brother    • No Known Problems Son    • No Known Problems Son    • No Known Problems Daughter       Social History:     Social History     Socioeconomic History   • Marital status: /Civil Union     Spouse name: None   • Number of children: None   • Years of education: None   • Highest education level: None   Occupational History   • None   Tobacco Use   • Smoking status: Never   • Smokeless tobacco: Never   Vaping Use   • Vaping Use: Never used   Substance and Sexual Activity   • Alcohol use: Yes     Alcohol/week: 1 0 standard drink     Types: 1 Standard drinks or equivalent per week   • Drug use: No   • Sexual activity: Never   Other Topics Concern   • None   Social History Narrative   • None     Social Determinants of Health     Financial Resource Strain: Low Risk    • Difficulty of Paying Living Expenses: Not hard at all   Food Insecurity: Not on file   Transportation Needs: No Transportation Needs   • Lack of Transportation (Medical):  No   • Lack of Transportation (Non-Medical): No   Physical Activity: Not on file   Stress: Not on file   Social Connections: Not on file   Intimate Partner Violence: Not on file   Housing Stability: Not on file      Medications and Allergies:     Current Outpatient Medications   Medication Sig Dispense Refill   • amLODIPine (NORVASC) 5 mg tablet Take 1 tablet (5 mg total) by mouth daily 90 tablet 0     No current facility-administered medications for this visit  No Known Allergies   Immunizations:     Immunization History   Administered Date(s) Administered   • Hep B, adult 01/21/2020, 02/25/2020      Health Maintenance:         Topic Date Due   • Colorectal Cancer Screening  01/09/2030   • Hepatitis C Screening  Completed     There are no preventive care reminders to display for this patient  Medicare Screening Tests and Risk Assessments:     Martha Bojorquez is here for his Subsequent Wellness visit  Last Medicare Wellness visit information reviewed, patient interviewed, no change since last AWV  Health Risk Assessment:   Patient rates overall health as very good  Patient feels that their physical health rating is slightly better  Patient is satisfied with their life  Eyesight was rated as slightly worse  Hearing was rated as same  Patient feels that their emotional and mental health rating is much better  Patients states they are never, rarely angry  Patient states they are never, rarely unusually tired/fatigued  Pain experienced in the last 7 days has been some  Patient's pain rating has been 3/10  Patient states that he has experienced no weight loss or gain in last 6 months  Depression Screening:   PHQ-2 Score: 0      Fall Risk Screening: In the past year, patient has experienced: no history of falling in past year      Home Safety:  Patient does not have trouble with stairs inside or outside of their home  Patient has working smoke alarms and has no working carbon monoxide detector  Home safety hazards include: none  Nutrition:   Current diet is No Added Salt, Diabetic, Low Cholesterol and Low Carb  Medications:   Patient is currently taking over-the-counter supplements  OTC medications include: see medication list  Patient is able to manage medications  Activities of Daily Living (ADLs)/Instrumental Activities of Daily Living (IADLs):   Walk and transfer into and out of bed and chair?: Yes  Dress and groom yourself?: Yes    Bathe or shower yourself?: Yes    Feed yourself? Yes  Do your laundry/housekeeping?: Yes  Manage your money, pay your bills and track your expenses?: Yes  Make your own meals?: Yes    Do your own shopping?: Yes    Previous Hospitalizations:   Any hospitalizations or ED visits within the last 12 months?: No      Advance Care Planning:   Living will: Yes    Durable POA for healthcare: Yes    Advanced directive: Yes    Five wishes given: No      Comments: Son is durable POA    Cognitive Screening:   Provider or family/friend/caregiver concerned regarding cognition?: No    PREVENTIVE SCREENINGS      Cardiovascular Screening:    General: History Lipid Disorder and Screening Current      Diabetes Screening:     General: History Diabetes and Screening Current      Colorectal Cancer Screening:     General: Screening Current      Prostate Cancer Screening:    General: Screening Current      Osteoporosis Screening:    General: Screening Not Indicated      Abdominal Aortic Aneurysm (AAA) Screening:    Risk factors include: age between 73-67 yo        General: Screening Not Indicated      Lung Cancer Screening:     General: Screening Not Indicated      Hepatitis C Screening:    General: Screening Current    Screening, Brief Intervention, and Referral to Treatment (SBIRT)    Screening      AUDIT-C Screenin) How often did you have a drink containing alcohol in the past year? 2 to 4 times a month  2) How many drinks did you have on a typical day when you were drinking in the past year?  1 to 2  3) How often did you have 6 or more drinks on one occasion in the past year? never    AUDIT-C Score: 2  Interpretation: Score 0-3 (male): Negative screen for alcohol misuse    Single Item Drug Screening:  How often have you used an illegal drug (including marijuana) or a prescription medication for non-medical reasons in the past year? never    Single Item Drug Screen Score: 0  Interpretation: Negative screen for possible drug use disorder    No results found  Physical Exam:     /76   Pulse (!) 51   Temp (!) 97 2 °F (36 2 °C) (Tympanic)   Resp 16   Ht 5' 4" (1 626 m)   Wt 78 9 kg (174 lb)   BMI 29 87 kg/m²     Physical Exam  Vitals reviewed  Constitutional:       Appearance: Normal appearance  He is obese  HENT:      Head: Normocephalic and atraumatic  Nose: Nose normal       Mouth/Throat:      Mouth: Mucous membranes are moist    Eyes:      Extraocular Movements: Extraocular movements intact  Pupils: Pupils are equal, round, and reactive to light  Cardiovascular:      Rate and Rhythm: Normal rate and regular rhythm  Pulses: Normal pulses  Heart sounds: Normal heart sounds  Pulmonary:      Effort: Pulmonary effort is normal       Breath sounds: Normal breath sounds  Musculoskeletal:         General: Normal range of motion  Skin:     General: Skin is warm  Neurological:      General: No focal deficit present  Mental Status: He is alert and oriented to person, place, and time  Psychiatric:         Mood and Affect: Mood normal          Behavior: Behavior normal          Thought Content:  Thought content normal          Judgment: Judgment normal           DORIAN Griffin

## 2023-02-03 ENCOUNTER — APPOINTMENT (OUTPATIENT)
Dept: LAB | Facility: CLINIC | Age: 73
End: 2023-02-03

## 2023-02-03 DIAGNOSIS — R97.20 ELEVATED PSA: ICD-10-CM

## 2023-02-03 LAB — PSA SERPL-MCNC: 6.8 NG/ML (ref 0–4)

## 2023-02-06 ENCOUNTER — TELEPHONE (OUTPATIENT)
Dept: UROLOGY | Facility: AMBULATORY SURGERY CENTER | Age: 73
End: 2023-02-06

## 2023-02-06 NOTE — TELEPHONE ENCOUNTER
Tried calling pt to schedule appointment for his elevated PSA  Phone rang and then went silent  Please try to call back

## 2023-02-07 NOTE — TELEPHONE ENCOUNTER
Called Tashi Walls to schedule an appointment and he states he is leaving today and won't be back to the middle of November and told him is PSA results and tried to scheduled in November and he states he is britton to schedule when he gets back

## 2023-03-03 DIAGNOSIS — I10 ESSENTIAL HYPERTENSION: Chronic | ICD-10-CM

## 2023-03-03 RX ORDER — AMLODIPINE BESYLATE 5 MG/1
TABLET ORAL
Qty: 90 TABLET | Refills: 0 | Status: SHIPPED | OUTPATIENT
Start: 2023-03-03

## 2023-12-01 ENCOUNTER — VBI (OUTPATIENT)
Dept: ADMINISTRATIVE | Facility: OTHER | Age: 73
End: 2023-12-01

## 2023-12-01 LAB
LEFT EYE DIABETIC RETINOPATHY: NORMAL
RIGHT EYE DIABETIC RETINOPATHY: NORMAL

## 2023-12-21 DIAGNOSIS — I10 ESSENTIAL HYPERTENSION: Chronic | ICD-10-CM

## 2023-12-22 RX ORDER — AMLODIPINE BESYLATE 5 MG/1
5 TABLET ORAL DAILY
Qty: 90 TABLET | Refills: 0 | Status: SHIPPED | OUTPATIENT
Start: 2023-12-22

## 2024-01-09 ENCOUNTER — OFFICE VISIT (OUTPATIENT)
Dept: FAMILY MEDICINE CLINIC | Facility: CLINIC | Age: 74
End: 2024-01-09
Payer: COMMERCIAL

## 2024-01-09 VITALS
BODY MASS INDEX: 30.9 KG/M2 | TEMPERATURE: 96.9 F | OXYGEN SATURATION: 99 % | SYSTOLIC BLOOD PRESSURE: 124 MMHG | RESPIRATION RATE: 16 BRPM | DIASTOLIC BLOOD PRESSURE: 82 MMHG | HEIGHT: 64 IN | WEIGHT: 181 LBS

## 2024-01-09 DIAGNOSIS — E11.9 TYPE 2 DIABETES, DIET CONTROLLED (HCC): Primary | ICD-10-CM

## 2024-01-09 DIAGNOSIS — I10 ESSENTIAL HYPERTENSION: Chronic | ICD-10-CM

## 2024-01-09 DIAGNOSIS — B35.3 TINEA PEDIS OF BOTH FEET: ICD-10-CM

## 2024-01-09 DIAGNOSIS — R97.20 ELEVATED PSA: ICD-10-CM

## 2024-01-09 DIAGNOSIS — H26.9 CATARACT OF BOTH EYES, UNSPECIFIED CATARACT TYPE: ICD-10-CM

## 2024-01-09 DIAGNOSIS — N18.2 STAGE 2 CHRONIC KIDNEY DISEASE: ICD-10-CM

## 2024-01-09 DIAGNOSIS — I73.9 PERIPHERAL VASCULAR DISEASE (HCC): ICD-10-CM

## 2024-01-09 DIAGNOSIS — E11.51 TYPE 2 DIABETES MELLITUS WITH DIABETIC PERIPHERAL ANGIOPATHY WITHOUT GANGRENE, WITHOUT LONG-TERM CURRENT USE OF INSULIN (HCC): ICD-10-CM

## 2024-01-09 DIAGNOSIS — E78.00 PURE HYPERCHOLESTEROLEMIA: ICD-10-CM

## 2024-01-09 LAB — SL AMB POCT HEMOGLOBIN AIC: 7.2 (ref ?–6.5)

## 2024-01-09 PROCEDURE — 99214 OFFICE O/P EST MOD 30 MIN: CPT | Performed by: NURSE PRACTITIONER

## 2024-01-09 PROCEDURE — 83036 HEMOGLOBIN GLYCOSYLATED A1C: CPT | Performed by: NURSE PRACTITIONER

## 2024-01-09 RX ORDER — TERBINAFINE HYDROCHLORIDE 250 MG/1
250 TABLET ORAL DAILY
Qty: 30 TABLET | Refills: 2 | Status: SHIPPED | OUTPATIENT
Start: 2024-01-09 | End: 2024-04-08

## 2024-01-09 RX ORDER — LATANOPROST 50 UG/ML
1 SOLUTION/ DROPS OPHTHALMIC DAILY
COMMUNITY
Start: 2023-12-01

## 2024-01-09 RX ORDER — PREDNISOLONE ACETATE 10 MG/ML
1 SUSPENSION/ DROPS OPHTHALMIC
COMMUNITY
Start: 2023-12-26

## 2024-01-09 RX ORDER — POLYMYXIN B SULFATE AND TRIMETHOPRIM 1; 10000 MG/ML; [USP'U]/ML
1 SOLUTION OPHTHALMIC
COMMUNITY
Start: 2023-12-26

## 2024-01-09 NOTE — ASSESSMENT & PLAN NOTE
Lab Results   Component Value Date    PSA 6.8 (H) 02/03/2023    PSA 6.2 (H) 12/23/2022    PSA 2.2 12/13/2018     Patient's last PSA was normal.  He was referred to urology.  The patient is not interested in seeing a urologist.  PSA for surveillance ordered.

## 2024-01-09 NOTE — ASSESSMENT & PLAN NOTE
Fungal infection between toes. Terbinafine 250 mg daily sent to pharmacy. Normally worse during the winter.

## 2024-01-09 NOTE — ASSESSMENT & PLAN NOTE
Lab Results   Component Value Date    HGBA1C 7.2 (A) 01/09/2024     Hemoglobin A1c in the office today is 7.2.  The patient has been a diet-controlled type II diabetic.  Treatment for diabetes was discussed with the patient today and he declined.  He would like to again try to manage this with diet.  A booklet was provided to the patient regarding carbohydrate counting.  Patient does exercise several times per week.  I will see him back in 3 months.  Hemoglobin A1c will be checked at that time.

## 2024-01-09 NOTE — PATIENT INSTRUCTIONS
Basic Carbohydrate Counting   AMBULATORY CARE:   Carbohydrate counting  is a way to plan your meals by counting the amount of carbohydrate in foods. Carbohydrates are the sugars, starches, and fiber found in fruit, grains, vegetables, and milk products. Carbohydrates increase your blood sugar levels. Carbohydrate counting can help you eat the right amount of carbohydrate to keep your blood sugar levels under control.   What you need to know about planning meals using carbohydrate counting:  A dietitian or healthcare provider will help you develop a healthy meal plan that works best for you. You will be taught how much carbohydrate to eat or drink for each meal and snack. Your meal plan will be based on your age, weight, usual food intake, and physical activity level. If you have diabetes, it will also include your blood sugar levels and diabetes medicine. Once you know how much carbohydrate you should eat, you can decide what type of food you want to eat.    You will need to know what foods contain carbohydrate and how much they contain. Keep track of the amount of carbohydrate in meals and snacks in order to follow your meal plan. Do not avoid carbohydrates or skip meals. Your blood sugar may fall too low if you do not eat enough carbohydrate or you skip meals.    Foods that contain carbohydrate:   Breads:  Each serving of food listed below contains about 15 g of carbohydrate .    1 slice of bread (1 ounce) or 1 flour or corn tortilla (6 inch)    ½ of a hamburger bun or ¼ of a large bagel (about 1 ounce)    1 pancake (about 4 inches across and ¼ inch thick)    Cereals and grains:  Serving sizes of ready-to-eat cereals vary. Look at the serving size and the total carbohydrate amount listed on the food label. Each serving of food listed below contains about 15 g of carbohydrate .    ¾ cup of dry, unsweetened, ready-to-eat cereal or ¼ cup of low-fat granola     ½ cup of oatmeal or other cooked cereal     ? cup of  cooked rice or pasta    Starchy vegetables and beans:  Each serving of food listed below contains about 15 g of carbohydrate .    ½ cup of corn, green peas, sweet potatoes, or mashed potatoes    ¼ of a large baked potato    ½ cup of beans, lentils, and peas (garbanzo, viera, kidney, white, split, black-eyed)    Crackers and snacks:  Each serving of food listed below contains about 15 g of carbohydrate .    3 joe cracker squares or 8 animal crackers     6 saltine-type crackers    3 cups of popcorn or ¾ ounce of pretzels, potato chips, or tortilla chips    Fruit:  Each serving of food listed below contains about 15 g of carbohydrate .    1 small (4 ounce) piece of fresh fruit or ¾ to 1 cup of fresh fruit    ½ cup of canned or frozen fruit, packed in natural juice    ½ cup (4 ounces) of unsweetened fruit juice    2 tablespoons of dried fruit    Desserts or sugary foods:  Each serving of food listed below contains about 15 g of carbohydrate .    2-inch square unfrosted cake or brownie     2 small cookies    ½ cup of ice cream, frozen yogurt, or nondairy frozen yogurt    ¼ cup of sherbet or sorbet    1 tablespoon of regular syrup, jam, or jelly    2 tablespoons of light syrup    Milk and yogurt:  Foods from the milk group contain about 12 g of carbohydrate per serving.    1 cup of fat-free or low-fat milk    1 cup of soy milk    ? cup of fat-free, yogurt sweetened with artificial sweetener    Non-starchy vegetables:  Each serving contains about 5 g of carbohydrate . Three servings of non-starch vegetables count as 1 carbohydrate serving.     ½ cup of cooked vegetables or 1 cup of raw vegetables. This includes beets, broccoli, cabbage, cauliflower, cucumber, mushrooms, tomatoes, and zucchini    ½ cup of vegetable juice    How to use carbohydrate counting to plan meals:   Count carbohydrate amounts using serving sizes:      Pasta dinner example:  You plan to have pasta, tossed salad, and an 8-ounce glass of milk. Your  healthcare provider tells you that you may have 4 carbohydrate servings for dinner. One carbohydrate serving of pasta is ? cup. One cup of pasta will equal 3 carbohydrate servings. An 8-ounce glass of milk will count as 1 carbohydrate serving. These amounts of food would equal 4 carbohydrate servings. One cup of tossed salad does not count toward your carbohydrate servings.       Count carbohydrate amounts using food labels:  Find the total amount of carbohydrate in a packaged food by reading the food label. Food labels tell you the serving size of the food and the total carbohydrate amount in each serving. Find the serving size on the food label and then decide how many servings you will eat. Multiply the number of servings you plan to eat by the carbohydrate amount per serving.     Granola bar snack example:  Your meal plan allows you to have 2 carbohydrate servings (30 grams) of carbohydrate for a snack. You plan to eat 1 package of granola bars, which contains 2 bars. According to the food label, the serving size of food in this package is 1 bar. Each serving (1 bar) contains 25 grams of carbohydrate. The total amount of carbohydrate in this package of granola bars would be 50 g. Based on your meal plan, you should eat only 1 bar.      Follow up with your doctor as directed:  Write down your questions so you remember to ask them during your visits.  © Copyright [employer] 2023 Information is for End User's use only and may not be sold, redistributed or otherwise used for commercial purposes.  The above information is an  only. It is not intended as medical advice for individual conditions or treatments. Talk to your doctor, nurse or pharmacist before following any medical regimen to see if it is safe and effective for you.

## 2024-01-09 NOTE — ASSESSMENT & PLAN NOTE
Blood pressure in the office today is 124/82.  The patient previously had been on Norvasc 5 mg but stopped this medication on his own.  He states he was checking his blood pressures at home and had been normal.  Low-salt diet recommended.  Continue with diet and exercise.

## 2024-01-09 NOTE — ASSESSMENT & PLAN NOTE
Lab Results   Component Value Date    EGFR 76 12/23/2022    EGFR 56 12/08/2022    EGFR 91 03/04/2021    CREATININE 0.99 12/23/2022    CREATININE 1.28 12/08/2022    CREATININE 0.98 03/04/2021   Surveillance blood work ordered.

## 2024-01-09 NOTE — PROGRESS NOTES
INTERNAL MEDICINE PRE-OPERATIVE EVALUATION  [facility]    NAME: Tony Ramirez  AGE: 73 y.o. SEX: male  : 1950     DATE: 2024     Internal Medicine Pre-Operative Evaluation:     Chief Complaint: Pre-operative Evaluation     Surgery: left eye cataract surgery  Anticipated Date of Surgery: 2024  Referring Provider: Damaso Decker MD        History of Present Illness:     Tony Ramirez is a 73 y.o. male who presents to the office today for a preoperative consultation at the request of surgeon, Delma Decker, who plans on performing left eye cataract surgery on 2024. Planned anesthesia is  MAC . Patient has a bleeding risk of: no recent abnormal bleeding. . Current anti-platelet/anti-coagulation medications that the patient is prescribed includes:  none .      Assessment of Chronic Conditions:   1. Type 2 diabetes, diet controlled (HCC)  -     Comprehensive metabolic panel; Future  -     POCT hemoglobin A1c  -     Albumin / creatinine urine ratio; Future    2. Essential hypertension  Assessment & Plan:  Blood pressure in the office today is 124/82.  The patient previously had been on Norvasc 5 mg but stopped this medication on his own.  He states he was checking his blood pressures at home and had been normal.  Low-salt diet recommended.  Continue with diet and exercise.    Orders:  -     CBC and differential; Future  -     Comprehensive metabolic panel; Future    3. Stage 2 chronic kidney disease  Assessment & Plan:  Lab Results   Component Value Date    EGFR 76 2022    EGFR 56 2022    EGFR 91 2021    CREATININE 0.99 2022    CREATININE 1.28 2022    CREATININE 0.98 2021   Surveillance blood work ordered.    Orders:  -     Comprehensive metabolic panel; Future    4. Pure hypercholesterolemia  Assessment & Plan:  Surveillance blood work ordered.  Patient has declined statins in the past.    Orders:  -      Lipid Panel with Direct LDL reflex; Future    5. Type 2 diabetes mellitus with diabetic peripheral angiopathy without gangrene, without long-term current use of insulin (HCC)  Assessment & Plan:    Lab Results   Component Value Date    HGBA1C 7.2 (A) 01/09/2024     Hemoglobin A1c in the office today is 7.2.  The patient has been a diet-controlled type II diabetic.  Treatment for diabetes was discussed with the patient today and he declined.  He would like to again try to manage this with diet.  A booklet was provided to the patient regarding carbohydrate counting.  Patient does exercise several times per week.  I will see him back in 3 months.  Hemoglobin A1c will be checked at that time.      6. Peripheral vascular disease (HCC)    7. Cataract of both eyes, unspecified cataract type  Assessment & Plan:  Patient with a previous right cataract surgery.  He is scheduled for left cataract surgery on January 24.      8. Elevated PSA  Assessment & Plan:  Lab Results   Component Value Date    PSA 6.8 (H) 02/03/2023    PSA 6.2 (H) 12/23/2022    PSA 2.2 12/13/2018     Patient's last PSA was normal.  He was referred to urology.  The patient is not interested in seeing a urologist.  PSA for surveillance ordered.      9. Tinea pedis of both feet  Assessment & Plan:  Fungal infection between toes. Terbinafine 250 mg daily sent to pharmacy. Normally worse during the winter.     Orders:  -     terbinafine (LamISIL) 250 mg tablet; Take 1 tablet (250 mg total) by mouth daily        Assessment of Cardiac Risk:  Denies unstable or severe angina or MI in the last 6 weeks or history of stent placement in the last year   Denies decompensated heart failure (e.g. New onset heart failure, NYHA functional class IV heart failure, or worsening existing heart failure)  Denies significant arrhythmias such as high grade AV block, symptomatic ventricular arrhythmia, newly recognized ventricular tachycardia, supraventricular tachycardia with  resting heart rate >100, or symptomatic bradycardia  Denies severe heart valve disease including aortic stenosis or symptomatic mitral stenosis     Exercise Capacity:  Able to walk 4 blocks without symptoms?: Yes  Able to walk 2 flights without symptoms?: Yes    Prior Anesthesia Reactions: No     Personal history of venous thromboembolic disease? No    History of steroid use for >2 weeks within last year? No          Review of Systems:     Review of Systems   Constitutional: Negative.  Negative for fatigue.   HENT: Negative.  Negative for congestion, postnasal drip, rhinorrhea and trouble swallowing.    Eyes: Negative.  Negative for visual disturbance.   Respiratory: Negative.  Negative for choking and shortness of breath.    Cardiovascular: Negative.  Negative for chest pain.   Gastrointestinal: Negative.    Endocrine: Negative.    Genitourinary: Negative.    Musculoskeletal: Negative.  Negative for arthralgias, back pain, myalgias and neck pain.   Skin: Negative.    Neurological:  Negative for dizziness and headaches.   Psychiatric/Behavioral: Negative.          Problem List:     Patient Active Problem List   Diagnosis    Hepatic steatosis    Essential hypertension    First degree AV block    Sigmoid diverticulosis    Overweight    Tremor of both hands    History of COVID-19    Pure hypercholesterolemia    Cataracts, bilateral    Peripheral vascular disease (HCC)    Stage 2 chronic kidney disease    Type 2 diabetes mellitus with diabetic peripheral angiopathy without gangrene, without long-term current use of insulin (HCC)    Elevated PSA    Tinea pedis of both feet        Allergies:     No Known Allergies     Current Medications:       Current Outpatient Medications:     latanoprost (XALATAN) 0.005 % ophthalmic solution, Administer 1 drop into the left eye daily, Disp: , Rfl:     polymyxin b-trimethoprim (POLYTRIM) ophthalmic solution, Administer 1 drop into the left eye, Disp: , Rfl:     prednisoLONE acetate  (PRED FORTE) 1 % ophthalmic suspension, Administer 1 drop into the left eye, Disp: , Rfl:     terbinafine (LamISIL) 250 mg tablet, Take 1 tablet (250 mg total) by mouth daily, Disp: 30 tablet, Rfl: 2     Past History:     Past Medical History:   Diagnosis Date    Diabetes mellitus (HCC)     Hypertension     Prediabetes         History reviewed. No pertinent surgical history.     Family History   Problem Relation Age of Onset    No Known Problems Mother     No Known Problems Father     Diabetes Sister     Diabetes Brother     No Known Problems Son     No Known Problems Daughter     No Known Problems Sister     No Known Problems Sister     No Known Problems Sister     No Known Problems Sister     No Known Problems Brother     No Known Problems Brother     No Known Problems Brother     No Known Problems Son     No Known Problems Son     No Known Problems Daughter         Social History     Socioeconomic History    Marital status: /Civil Union     Spouse name: Not on file    Number of children: Not on file    Years of education: Not on file    Highest education level: Not on file   Occupational History    Not on file   Tobacco Use    Smoking status: Never     Passive exposure: Never    Smokeless tobacco: Never   Vaping Use    Vaping status: Never Used   Substance and Sexual Activity    Alcohol use: Yes     Alcohol/week: 1.0 standard drink of alcohol     Types: 1 Standard drinks or equivalent per week    Drug use: No    Sexual activity: Never   Other Topics Concern    Not on file   Social History Narrative    Not on file     Social Determinants of Health     Financial Resource Strain: Low Risk  (1/11/2023)    Overall Financial Resource Strain (CARDIA)     Difficulty of Paying Living Expenses: Not hard at all   Food Insecurity: No Food Insecurity (3/25/2021)    Hunger Vital Sign     Worried About Running Out of Food in the Last Year: Never true     Ran Out of Food in the Last Year: Never true   Transportation  "Needs: No Transportation Needs (1/11/2023)    PRAPARE - Transportation     Lack of Transportation (Medical): No     Lack of Transportation (Non-Medical): No   Physical Activity: Inactive (2/17/2021)    Exercise Vital Sign     Days of Exercise per Week: 0 days     Minutes of Exercise per Session: 0 min   Stress: No Stress Concern Present (2/17/2021)    [place] [employer] - Occupational Stress Questionnaire     Feeling of Stress : Not at all   Social Connections: Socially Isolated (2/17/2021)    Social Connection and Isolation Panel [NHANES]     Frequency of Communication with Friends and Family: Once a week     Frequency of Social Gatherings with Friends and Family: Once a week     Attends Bahai Services: Never     Active Member of Clubs or Organizations: Not on file     Attends Club or Organization Meetings: Never     Marital Status:    Intimate Partner Violence: Not At Risk (2/17/2021)    Humiliation, Afraid, Rape, and Kick questionnaire     Fear of Current or Ex-Partner: No     Emotionally Abused: No     Physically Abused: No     Sexually Abused: No   Housing Stability: Not on file        Physical Exam:      /82   Temp (!) 96.9 °F (36.1 °C) (Tympanic)   Resp 16   Ht 5' 4\" (1.626 m)   Wt 82.1 kg (181 lb)   SpO2 99%   BMI 31.07 kg/m²     Physical Exam  Constitutional:       General: He is not in acute distress.     Appearance: Normal appearance. He is well-developed. He is obese.   HENT:      Head: Normocephalic and atraumatic.   Eyes:      Pupils: Pupils are equal, round, and reactive to light.   Cardiovascular:      Rate and Rhythm: Normal rate and regular rhythm.      Pulses: Normal pulses.      Heart sounds: Normal heart sounds. No murmur heard.  Pulmonary:      Effort: Pulmonary effort is normal. No respiratory distress.      Breath sounds: Normal breath sounds. No wheezing.   Abdominal:      General: Bowel sounds are normal.      Palpations: Abdomen is soft. "   Musculoskeletal:         General: Normal range of motion.      Cervical back: Normal range of motion.   Skin:     General: Skin is warm and dry.   Neurological:      General: No focal deficit present.      Mental Status: He is alert and oriented to person, place, and time. Mental status is at baseline.   Psychiatric:         Mood and Affect: Mood normal.         Behavior: Behavior normal.         Thought Content: Thought content normal.         Judgment: Judgment normal.          Plan:     73 y.o. male with planned surgery: left cataract surgery.      Cardiac Risk Estimation: per the Revised Cardiac Risk Index (Circ. 100:1043, 1999), the patient's risk factors for cardiac complications include  untreated type 2 diabetes , putting him in: RCI RISK CLASS I (0 risk factors, risk of major cardiac compl. appr. 0.5%).    1. Further preoperative workup as follows:   - None; no further preoperative work-up is required    2. Medication Management/Recommendations:   - None, continue medication regimen including morning of surgery, with sip of water    3. Prophylaxis for cardiac events with perioperative beta-blockers: not indicated.    4. Patient requires further consultation with: None    Clearance  Patient is CLEARED for surgery without any additional cardiac testing.     DORIAN French  [address] [address]  Phone#  [phone]  Fax#  [phone]

## 2024-01-09 NOTE — ASSESSMENT & PLAN NOTE
Patient with a previous right cataract surgery.  He is scheduled for left cataract surgery on January 24.

## 2024-01-15 ENCOUNTER — APPOINTMENT (OUTPATIENT)
Dept: LAB | Facility: CLINIC | Age: 74
End: 2024-01-15
Payer: COMMERCIAL

## 2024-01-15 DIAGNOSIS — I10 ESSENTIAL HYPERTENSION: Chronic | ICD-10-CM

## 2024-01-15 DIAGNOSIS — E11.9 TYPE 2 DIABETES, DIET CONTROLLED (HCC): ICD-10-CM

## 2024-01-15 DIAGNOSIS — E78.00 PURE HYPERCHOLESTEROLEMIA: ICD-10-CM

## 2024-01-15 DIAGNOSIS — N18.2 STAGE 2 CHRONIC KIDNEY DISEASE: ICD-10-CM

## 2024-01-15 DIAGNOSIS — R97.20 ELEVATED PSA: ICD-10-CM

## 2024-01-15 LAB
ALBUMIN SERPL BCP-MCNC: 4.4 G/DL (ref 3.5–5)
ALP SERPL-CCNC: 41 U/L (ref 34–104)
ALT SERPL W P-5'-P-CCNC: 19 U/L (ref 7–52)
ANION GAP SERPL CALCULATED.3IONS-SCNC: 8 MMOL/L
AST SERPL W P-5'-P-CCNC: 21 U/L (ref 13–39)
BASOPHILS # BLD AUTO: 0.03 THOUSANDS/ÂΜL (ref 0–0.1)
BASOPHILS NFR BLD AUTO: 1 % (ref 0–1)
BILIRUB SERPL-MCNC: 0.8 MG/DL (ref 0.2–1)
BUN SERPL-MCNC: 14 MG/DL (ref 5–25)
CALCIUM SERPL-MCNC: 9.6 MG/DL (ref 8.4–10.2)
CHLORIDE SERPL-SCNC: 103 MMOL/L (ref 96–108)
CHOLEST SERPL-MCNC: 159 MG/DL
CO2 SERPL-SCNC: 30 MMOL/L (ref 21–32)
CREAT SERPL-MCNC: 1.04 MG/DL (ref 0.6–1.3)
CREAT UR-MCNC: 162.4 MG/DL
EOSINOPHIL # BLD AUTO: 0.08 THOUSAND/ÂΜL (ref 0–0.61)
EOSINOPHIL NFR BLD AUTO: 2 % (ref 0–6)
ERYTHROCYTE [DISTWIDTH] IN BLOOD BY AUTOMATED COUNT: 13.3 % (ref 11.6–15.1)
GFR SERPL CREATININE-BSD FRML MDRD: 71 ML/MIN/1.73SQ M
GLUCOSE P FAST SERPL-MCNC: 131 MG/DL (ref 65–99)
HCT VFR BLD AUTO: 44.9 % (ref 36.5–49.3)
HDLC SERPL-MCNC: 34 MG/DL
HGB BLD-MCNC: 14.6 G/DL (ref 12–17)
IMM GRANULOCYTES # BLD AUTO: 0.01 THOUSAND/UL (ref 0–0.2)
IMM GRANULOCYTES NFR BLD AUTO: 0 % (ref 0–2)
LDLC SERPL CALC-MCNC: 93 MG/DL (ref 0–100)
LYMPHOCYTES # BLD AUTO: 1.65 THOUSANDS/ÂΜL (ref 0.6–4.47)
LYMPHOCYTES NFR BLD AUTO: 40 % (ref 14–44)
MCH RBC QN AUTO: 25.6 PG (ref 26.8–34.3)
MCHC RBC AUTO-ENTMCNC: 32.5 G/DL (ref 31.4–37.4)
MCV RBC AUTO: 79 FL (ref 82–98)
MICROALBUMIN UR-MCNC: 13 MG/L
MICROALBUMIN/CREAT 24H UR: 8 MG/G CREATININE (ref 0–30)
MONOCYTES # BLD AUTO: 0.51 THOUSAND/ÂΜL (ref 0.17–1.22)
MONOCYTES NFR BLD AUTO: 12 % (ref 4–12)
NEUTROPHILS # BLD AUTO: 1.89 THOUSANDS/ÂΜL (ref 1.85–7.62)
NEUTS SEG NFR BLD AUTO: 45 % (ref 43–75)
NRBC BLD AUTO-RTO: 0 /100 WBCS
PLATELET # BLD AUTO: 248 THOUSANDS/UL (ref 149–390)
PMV BLD AUTO: 10.2 FL (ref 8.9–12.7)
POTASSIUM SERPL-SCNC: 4.2 MMOL/L (ref 3.5–5.3)
PROT SERPL-MCNC: 8 G/DL (ref 6.4–8.4)
PSA SERPL-MCNC: 7.84 NG/ML (ref 0–4)
RBC # BLD AUTO: 5.7 MILLION/UL (ref 3.88–5.62)
SODIUM SERPL-SCNC: 141 MMOL/L (ref 135–147)
TRIGL SERPL-MCNC: 162 MG/DL
WBC # BLD AUTO: 4.17 THOUSAND/UL (ref 4.31–10.16)

## 2024-01-15 PROCEDURE — 84153 ASSAY OF PSA TOTAL: CPT

## 2024-01-15 PROCEDURE — 85025 COMPLETE CBC W/AUTO DIFF WBC: CPT

## 2024-01-15 PROCEDURE — 82043 UR ALBUMIN QUANTITATIVE: CPT

## 2024-01-15 PROCEDURE — 80053 COMPREHEN METABOLIC PANEL: CPT

## 2024-01-15 PROCEDURE — 36415 COLL VENOUS BLD VENIPUNCTURE: CPT

## 2024-01-15 PROCEDURE — 82570 ASSAY OF URINE CREATININE: CPT

## 2024-01-15 PROCEDURE — 80061 LIPID PANEL: CPT

## 2024-01-16 DIAGNOSIS — R97.20 ELEVATED PSA: Primary | ICD-10-CM

## 2024-01-23 ENCOUNTER — OFFICE VISIT (OUTPATIENT)
Dept: UROLOGY | Facility: AMBULATORY SURGERY CENTER | Age: 74
End: 2024-01-23
Payer: COMMERCIAL

## 2024-01-23 VITALS
WEIGHT: 184 LBS | HEART RATE: 63 BPM | DIASTOLIC BLOOD PRESSURE: 88 MMHG | SYSTOLIC BLOOD PRESSURE: 160 MMHG | OXYGEN SATURATION: 99 % | BODY MASS INDEX: 31.58 KG/M2

## 2024-01-23 DIAGNOSIS — R97.20 ELEVATED PSA: ICD-10-CM

## 2024-01-23 PROCEDURE — 99213 OFFICE O/P EST LOW 20 MIN: CPT

## 2024-01-23 NOTE — PROGRESS NOTES
Office Visit- Urology  Tony Ramirez 1950 MRN: [id number]      Assessment/Discussion/Plan    73 y.o. male managed by     Elevated PSA  -PSA elevated at 6.8 measured  at 7.84 on 1/15/2024.  Previously 6.8 in February 2023 and 6.2 in December 2022.  -JACK without palpable concern for prostate cancer.    -Will obtain a multiparametric MRI of the prostate for further evaluation.  Have patient return to the office to discuss results.  If PI-RADS 3 or above we will recommend an MRI fusion transperineal biopsy.  If PI-RADS 1-2 can continue with surveillance of PSA with note that if PSA continues to increase we will obtain a ultrasound-guided biopsy    2.  Gross hematuria  -S/p cystoscopy in March 2021 with no evidence of malignancy  -Patient denies further episodes of gross hematuria        Chief Complaint:   Tony is a 73 y.o. male presenting to the office for an initial evaluation regarding elevated PSA        Subjective    Patient is a 73-year-old male who presents for follow-up regards to elevated PSA.  He was last in the office in December 2022 for follow-up for gross hematuria/routine prostate cancer screening.  He had a cystoscopy in March 2021 with no evidence of malignancy and since that point in time patient has not had further episodes of gross hematuria.  He had a PSA I will repeat this after that returned at 6.2 in December 2022.  On recheck in February 2023 it nick to 6.8.  His most recent PSA measured 7.84 on 1/15/2024.  Patient denies any known family history of prostate cancer or breast, ovarian, pancreatic cancer.  He does not take any medications for the prostate including finasteride.  He largely denies any bothersome urinary tract symptoms      ROS:   Review of Systems   Constitutional: Negative.  Negative for chills, fatigue and fever.   HENT: Negative.     Respiratory:  Negative for shortness of breath.    Cardiovascular:  Negative for chest pain.   Gastrointestinal: Negative.  Negative for  abdominal pain.   Endocrine: Negative.    Musculoskeletal: Negative.    Skin: Negative.    Neurological: Negative.  Negative for dizziness and light-headedness.   Hematological: Negative.    Psychiatric/Behavioral: Negative.           Past Medical History  Past Medical History:   Diagnosis Date    Diabetes mellitus (HCC)     Hypertension     Prediabetes        Past Surgical History  History reviewed. No pertinent surgical history.    Past Family History  Family History   Problem Relation Age of Onset    No Known Problems Mother     No Known Problems Father     Diabetes Sister     Diabetes Brother     No Known Problems Son     No Known Problems Daughter     No Known Problems Sister     No Known Problems Sister     No Known Problems Sister     No Known Problems Sister     No Known Problems Brother     No Known Problems Brother     No Known Problems Brother     No Known Problems Son     No Known Problems Son     No Known Problems Daughter        Past Social history  Social History     Socioeconomic History    Marital status: /Civil Union     Spouse name: Not on file    Number of children: Not on file    Years of education: Not on file    Highest education level: Not on file   Occupational History    Not on file   Tobacco Use    Smoking status: Never     Passive exposure: Never    Smokeless tobacco: Never   Vaping Use    Vaping status: Never Used   Substance and Sexual Activity    Alcohol use: Yes     Alcohol/week: 1.0 standard drink of alcohol     Types: 1 Standard drinks or equivalent per week    Drug use: No    Sexual activity: Not Currently   Other Topics Concern    Not on file   Social History Narrative    Not on file     Social Determinants of Health     Financial Resource Strain: Low Risk  (1/11/2023)    Overall Financial Resource Strain (CARDIA)     Difficulty of Paying Living Expenses: Not hard at all   Food Insecurity: No Food Insecurity (3/25/2021)    Hunger Vital Sign     Worried About Running Out of  Food in the Last Year: Never true     Ran Out of Food in the Last Year: Never true   Transportation Needs: No Transportation Needs (1/11/2023)    PRAPARE - Transportation     Lack of Transportation (Medical): No     Lack of Transportation (Non-Medical): No   Physical Activity: Inactive (2/17/2021)    Exercise Vital Sign     Days of Exercise per Week: 0 days     Minutes of Exercise per Session: 0 min   Stress: No Stress Concern Present (2/17/2021)    [place] of Occupational Health - Occupational Stress Questionnaire     Feeling of Stress : Not at all   Social Connections: Socially Isolated (2/17/2021)    Social Connection and Isolation Panel [NHANES]     Frequency of Communication with Friends and Family: Once a week     Frequency of Social Gatherings with Friends and Family: Once a week     Attends Anabaptist Services: Never     Active Member of Clubs or Organizations: Not on file     Attends Club or Organization Meetings: Never     Marital Status:    Intimate Partner Violence: Not At Risk (2/17/2021)    Humiliation, Afraid, Rape, and Kick questionnaire     Fear of Current or Ex-Partner: No     Emotionally Abused: No     Physically Abused: No     Sexually Abused: No   Housing Stability: Not on file       Current Medications  Current Outpatient Medications   Medication Sig Dispense Refill    latanoprost (XALATAN) 0.005 % ophthalmic solution Administer 1 drop into the left eye daily      polymyxin b-trimethoprim (POLYTRIM) ophthalmic solution Administer 1 drop into the left eye      prednisoLONE acetate (PRED FORTE) 1 % ophthalmic suspension Administer 1 drop into the left eye      terbinafine (LamISIL) 250 mg tablet Take 1 tablet (250 mg total) by mouth daily 30 tablet 2     No current facility-administered medications for this visit.       Allergies  No Known Allergies    OBJECTIVE    Vitals   Vitals:    01/23/24 0744   BP: 160/88   BP Location: Left arm   Patient Position: Sitting   Cuff Size: Adult    Pulse: 63   SpO2: 99%   Weight: 83.5 kg (184 lb)       Physical Exam  Constitutional:       General: He is not in acute distress.     Appearance: Normal appearance. He is normal weight. He is not ill-appearing or toxic-appearing.   HENT:      Head: Normocephalic and atraumatic.   Eyes:      Conjunctiva/sclera: Conjunctivae normal.   Cardiovascular:      Rate and Rhythm: Normal rate.   Pulmonary:      Effort: Pulmonary effort is normal. No respiratory distress.   Genitourinary:     Comments: On prostate exam evidence of a significantly enlarged prostate overall smooth.  No appreciated nodules or induration  Musculoskeletal:         General: Normal range of motion.   Skin:     General: Skin is warm and dry.   Neurological:      General: No focal deficit present.      Mental Status: He is alert and oriented to person, place, and time.      Cranial Nerves: No cranial nerve deficit.   Psychiatric:         Mood and Affect: Mood normal.         Behavior: Behavior normal.         Thought Content: Thought content normal.          Labs:     Lab Results   Component Value Date    PSA 7.84 (H) 01/15/2024    PSA 6.8 (H) 02/03/2023    PSA 6.2 (H) 12/23/2022    PSA 2.2 12/13/2018     Lab Results   Component Value Date    CREATININE 1.04 01/15/2024      Lab Results   Component Value Date    HGBA1C 7.2 (A) 01/09/2024     Lab Results   Component Value Date    CALCIUM 9.6 01/15/2024    K 4.2 01/15/2024    CO2 30 01/15/2024     01/15/2024    BUN 14 01/15/2024    CREATININE 1.04 01/15/2024       I have personally reviewed all pertinent lab results and reviewed with patient    Imaging       Clyde Iglesias PA-C  Date: 1/23/2024 Time: 7:53 AM  [facility]    This note was written using fluency dictation software. Please excuse any resulting minor grammatical errors.

## 2024-01-31 ENCOUNTER — HOSPITAL ENCOUNTER (OUTPATIENT)
Dept: RADIOLOGY | Age: 74
Discharge: HOME | End: 2024-01-31
Payer: COMMERCIAL

## 2024-01-31 DIAGNOSIS — R97.20 ELEVATED PSA: ICD-10-CM

## 2024-01-31 PROCEDURE — 72197 MRI PELVIS W/O & W/DYE: CPT

## 2024-01-31 PROCEDURE — A9585 GADOBUTROL INJECTION: HCPCS

## 2024-01-31 PROCEDURE — 76377 3D RENDER W/INTRP POSTPROCES: CPT

## 2024-01-31 PROCEDURE — G1004 CDSM NDSC: HCPCS

## 2024-01-31 RX ORDER — GADOBUTROL 604.72 MG/ML
8 INJECTION INTRAVENOUS
Status: COMPLETED | OUTPATIENT
Start: 2024-01-31 | End: 2024-01-31

## 2024-01-31 RX ADMIN — GADOBUTROL 8 ML: 604.72 INJECTION INTRAVENOUS at 14:50

## 2024-02-09 ENCOUNTER — TELEPHONE (OUTPATIENT)
Dept: UROLOGY | Facility: CLINIC | Age: 74
End: 2024-02-09

## 2024-02-09 NOTE — TELEPHONE ENCOUNTER
I called patient to discuss the results of his multiparametric MRI of the prostate.  It returned PI-RADS 5 and there is concern for prostate cancer.  I discussed with him that would recommend MRI fusion transperineal biopsy.  Discussed this procedure in depth as well as the risks.  [place].    Patient will need a MRI fusion biopsy scheduled.  Because I discussed the MRI results over the phone I cannot obtain an informed consent at this point in time.  Please schedule for next available MRI fusion biopsy the patient will be able to return home for

## 2024-02-12 NOTE — TELEPHONE ENCOUNTER
I spoke to the patient and scheduled his procedure for 4/3/2024 at [facility] with Dr. Marina    -instructions given verbally and mailed  -patient aware to be NPO, needs a  and use an enema 1 hour prior to leaving the house morning of procedure  -CBC, CMP, Urine C&S and EKG 2 weeks prior  -PO/JR 4/12/2024

## 2024-03-28 NOTE — PRE-PROCEDURE INSTRUCTIONS
No outpatient medications have been marked as taking for the 4/3/24 encounter (Hospital Encounter).      Prep enema, comfortably full bladder    Medication instructions for day surgery reviewed. Please use only a sip of water to take your instructed medications. Avoid all over the counter vitamins, supplements and NSAIDS for one week prior to surgery per anesthesia guidelines. Tylenol is ok to take as needed.     You will receive a call one business day prior to surgery with an arrival time and hospital directions. If your surgery is scheduled on a Monday, the hospital will be calling you on the Friday prior to your surgery. If you have not heard from anyone by 8pm, please call the hospital supervisor through the hospital  at [phone]. ([place] [phone] or [place] [phone]).    Do not eat or drink anything after midnight the night before your surgery, including candy, mints, lifesavers, or chewing gum. Do not drink alcohol 24hrs before your surgery. Try not to smoke at least 24hrs before your surgery.       Follow the pre surgery showering instructions as listed in the “My Surgical Experience Booklet” or otherwise provided by your surgeon's office. Do not use a blade to shave the surgical area 1 week before surgery. It is okay to use a clean electric clippers up to 24 hours before surgery. Do not apply any lotions, creams, including makeup, cologne, deodorant, or perfumes after showering on the day of your surgery. Do not use dry shampoo, hair spray, hair gel, or any type of hair products.     No contact lenses, eye make-up, or artificial eyelashes. Remove nail polish, including gel polish, and any artificial, gel, or acrylic nails if possible. Remove all jewelry including rings and body piercing jewelry.     Wear causal clothing that is easy to take on and off. Consider your type of surgery.    Keep any valuables, jewelry, piercings at home. Please bring any specially ordered equipment (sling,  braces) if indicated.    Arrange for a responsible person to drive you to and from the hospital on the day of your surgery. Please confirm the visitor policy for the day of your procedure when you receive your phone call with an arrival time.     Call the surgeon's office with any new illnesses, exposures, or additional questions prior to surgery.    Please reference your “My Surgical Experience Booklet” for additional information to prepare for your upcoming surgery.

## 2024-04-01 ENCOUNTER — APPOINTMENT (OUTPATIENT)
Dept: LAB | Facility: CLINIC | Age: 74
End: 2024-04-01
Payer: COMMERCIAL

## 2024-04-01 DIAGNOSIS — R97.20 ELEVATED PROSTATE SPECIFIC ANTIGEN (PSA): ICD-10-CM

## 2024-04-01 DIAGNOSIS — R39.89 SUSPECTED UTI: ICD-10-CM

## 2024-04-01 LAB
ALBUMIN SERPL BCP-MCNC: 4 G/DL (ref 3.5–5)
ALP SERPL-CCNC: 34 U/L (ref 34–104)
ALT SERPL W P-5'-P-CCNC: 17 U/L (ref 7–52)
ANION GAP SERPL CALCULATED.3IONS-SCNC: 8 MMOL/L (ref 4–13)
AST SERPL W P-5'-P-CCNC: 22 U/L (ref 13–39)
ATRIAL RATE: 62 BPM
BASOPHILS # BLD AUTO: 0.03 THOUSANDS/ÂΜL (ref 0–0.1)
BASOPHILS NFR BLD AUTO: 1 % (ref 0–1)
BILIRUB SERPL-MCNC: 0.93 MG/DL (ref 0.2–1)
BUN SERPL-MCNC: 16 MG/DL (ref 5–25)
CALCIUM SERPL-MCNC: 9.2 MG/DL (ref 8.4–10.2)
CHLORIDE SERPL-SCNC: 106 MMOL/L (ref 96–108)
CO2 SERPL-SCNC: 30 MMOL/L (ref 21–32)
CREAT SERPL-MCNC: 1.08 MG/DL (ref 0.6–1.3)
EOSINOPHIL # BLD AUTO: 0.06 THOUSAND/ÂΜL (ref 0–0.61)
EOSINOPHIL NFR BLD AUTO: 1 % (ref 0–6)
ERYTHROCYTE [DISTWIDTH] IN BLOOD BY AUTOMATED COUNT: 13.2 % (ref 11.6–15.1)
GFR SERPL CREATININE-BSD FRML MDRD: 68 ML/MIN/1.73SQ M
GLUCOSE P FAST SERPL-MCNC: 118 MG/DL (ref 65–99)
HCT VFR BLD AUTO: 43.6 % (ref 36.5–49.3)
HGB BLD-MCNC: 13.7 G/DL (ref 12–17)
IMM GRANULOCYTES # BLD AUTO: 0 THOUSAND/UL (ref 0–0.2)
IMM GRANULOCYTES NFR BLD AUTO: 0 % (ref 0–2)
LYMPHOCYTES # BLD AUTO: 1.52 THOUSANDS/ÂΜL (ref 0.6–4.47)
LYMPHOCYTES NFR BLD AUTO: 36 % (ref 14–44)
MCH RBC QN AUTO: 25.6 PG (ref 26.8–34.3)
MCHC RBC AUTO-ENTMCNC: 31.4 G/DL (ref 31.4–37.4)
MCV RBC AUTO: 81 FL (ref 82–98)
MONOCYTES # BLD AUTO: 0.56 THOUSAND/ÂΜL (ref 0.17–1.22)
MONOCYTES NFR BLD AUTO: 13 % (ref 4–12)
NEUTROPHILS # BLD AUTO: 2.01 THOUSANDS/ÂΜL (ref 1.85–7.62)
NEUTS SEG NFR BLD AUTO: 49 % (ref 43–75)
NRBC BLD AUTO-RTO: 0 /100 WBCS
P AXIS: 36 DEGREES
PLATELET # BLD AUTO: 235 THOUSANDS/UL (ref 149–390)
PMV BLD AUTO: 9.4 FL (ref 8.9–12.7)
POTASSIUM SERPL-SCNC: 4.1 MMOL/L (ref 3.5–5.3)
PR INTERVAL: 214 MS
PROT SERPL-MCNC: 7.4 G/DL (ref 6.4–8.4)
QRS AXIS: -40 DEGREES
QRSD INTERVAL: 118 MS
QT INTERVAL: 430 MS
QTC INTERVAL: 436 MS
RBC # BLD AUTO: 5.36 MILLION/UL (ref 3.88–5.62)
SODIUM SERPL-SCNC: 144 MMOL/L (ref 135–147)
T WAVE AXIS: 40 DEGREES
VENTRICULAR RATE: 62 BPM
WBC # BLD AUTO: 4.18 THOUSAND/UL (ref 4.31–10.16)

## 2024-04-01 PROCEDURE — 85025 COMPLETE CBC W/AUTO DIFF WBC: CPT

## 2024-04-01 PROCEDURE — 36415 COLL VENOUS BLD VENIPUNCTURE: CPT

## 2024-04-01 PROCEDURE — 87086 URINE CULTURE/COLONY COUNT: CPT

## 2024-04-01 PROCEDURE — 80053 COMPREHEN METABOLIC PANEL: CPT

## 2024-04-01 PROCEDURE — 87147 CULTURE TYPE IMMUNOLOGIC: CPT

## 2024-04-02 ENCOUNTER — TELEPHONE (OUTPATIENT)
Dept: UROLOGY | Facility: CLINIC | Age: 74
End: 2024-04-02

## 2024-04-02 ENCOUNTER — ANESTHESIA EVENT (OUTPATIENT)
Dept: PERIOP | Facility: HOSPITAL | Age: 74
End: 2024-04-02
Payer: COMMERCIAL

## 2024-04-02 DIAGNOSIS — N30.00 ACUTE CYSTITIS WITHOUT HEMATURIA: Primary | ICD-10-CM

## 2024-04-02 LAB — BACTERIA UR CULT: ABNORMAL

## 2024-04-02 PROCEDURE — NC001 PR NO CHARGE: Performed by: UROLOGY

## 2024-04-02 RX ORDER — AMOXICILLIN 500 MG/1
500 CAPSULE ORAL EVERY 8 HOURS SCHEDULED
Qty: 21 CAPSULE | Refills: 0 | Status: SHIPPED | OUTPATIENT
Start: 2024-04-02 | End: 2024-04-09

## 2024-04-02 NOTE — H&P
H&P Exam - Urology  Tony Ramirez1950      Assessment/Plan     Assessment:  Elevated PSA with rise to 7.84 as of January 15, 2024, MP MRI shows category 5 lesion 1.8 cm left apex posterior medial peripheral zone.  Plan:  MR fusion transperineal prostate biopsy.    History of Present Illness   HPI:  The patient presents for the above procedure. The risks of bleeding, infection, urinary retention and need for additional procedures has been explained and informed consent given.    Past Medical History:   Diagnosis Date    Arthritis     BPH with obstruction/lower urinary tract symptoms     Elevated PSA     Feeling of incomplete bladder emptying '    Hypertension     Malignant neoplasm of lateral wall of urinary bladder (HCC) 06/29/2016    Urinary retention        Past Surgical History:   Procedure Laterality Date    CYSTOSCOPY      CYSTOSCOPY      and biopsy    HERNIA REPAIR      KNEE SURGERY      PROSTATE BIOPSY  2007    TRANSURETHRAL RESECTION OF PROSTATE  09/01/2016           Review of systems:    General: No fever.  CVS: No chest pain or new SOB.  Abdomen: No diarrhea or blood in stool.  : No new voiding difficulties.  Neuro: No syncope/weakness/loss of sensation/paresis  Ophthalmologic: No new visual changes.  Ortho: No new back/joint pains.    Social History- as per previous notes.        Family History: non-contributory    Meds/Allergies   all medications and allergies reviewed      Objective   Vitals: T vital signs reviewed.    No intake/output data recorded.          Physical Exam:    Awake, alert, in NAD.    HEENT: Atraumatic, Normocephalic    Lungs: Normal Respiratory effort, no wheezes,stridor or rales.  Clear to auscultation bilaterally    CVS- RRR, normal heart sounds    Abdomen: Nondistended.    Extremiites: Normal ROM, no cyanosis/edema.      Lab Results: I have personally reviewed pertinent reports.    Imaging: I have personally reviewed pertinent reports.

## 2024-04-02 NOTE — TELEPHONE ENCOUNTER
----- Message from Karan Marina MD sent at 4/2/2024 11:08 AM EDT -----  Please let him know that he has a small amount of bacteria in his urine, I think it may be contamination but just to make sure I want to treat him with amoxicillin for 7 days and we can continue with the biopsy.

## 2024-04-02 NOTE — TELEPHONE ENCOUNTER
Called patient and left a detailed vm, per communication consent. Informed the pt that his urine culture came back positive for infection and that Amoxicillin was sent to his pharmacy. Left office number in case of any questions or concerns.

## 2024-04-03 ENCOUNTER — HOSPITAL ENCOUNTER (OUTPATIENT)
Facility: HOSPITAL | Age: 74
Setting detail: OUTPATIENT SURGERY
Discharge: HOME | End: 2024-04-03
Attending: UROLOGY | Admitting: UROLOGY
Payer: COMMERCIAL

## 2024-04-03 ENCOUNTER — ANESTHESIA (OUTPATIENT)
Dept: PERIOP | Facility: HOSPITAL | Age: 74
End: 2024-04-03
Payer: COMMERCIAL

## 2024-04-03 VITALS
OXYGEN SATURATION: 94 % | HEIGHT: 64 IN | TEMPERATURE: 97 F | WEIGHT: 179.68 LBS | SYSTOLIC BLOOD PRESSURE: 158 MMHG | DIASTOLIC BLOOD PRESSURE: 74 MMHG | RESPIRATION RATE: 16 BRPM | HEART RATE: 53 BPM | BODY MASS INDEX: 30.67 KG/M2

## 2024-04-03 DIAGNOSIS — R97.20 ELEVATED PROSTATE SPECIFIC ANTIGEN (PSA): ICD-10-CM

## 2024-04-03 PROBLEM — H40.9 GLAUCOMA: Status: ACTIVE | Noted: 2024-04-03

## 2024-04-03 LAB — GLUCOSE SERPL-MCNC: 114 MG/DL (ref 65–140)

## 2024-04-03 PROCEDURE — 88342 IMHCHEM/IMCYTCHM 1ST ANTB: CPT | Performed by: PATHOLOGY

## 2024-04-03 PROCEDURE — G0416 PROSTATE BIOPSY, ANY MTHD: HCPCS | Performed by: PATHOLOGY

## 2024-04-03 PROCEDURE — 88341 IMHCHEM/IMCYTCHM EA ADD ANTB: CPT | Performed by: PATHOLOGY

## 2024-04-03 PROCEDURE — 82948 REAGENT STRIP/BLOOD GLUCOSE: CPT

## 2024-04-03 PROCEDURE — 55700 PR PROSTATE NEEDLE BIOPSY ANY APPROACH: CPT | Performed by: UROLOGY

## 2024-04-03 PROCEDURE — 88344 IMHCHEM/IMCYTCHM EA MLT ANTB: CPT | Performed by: PATHOLOGY

## 2024-04-03 PROCEDURE — 76942 ECHO GUIDE FOR BIOPSY: CPT | Performed by: UROLOGY

## 2024-04-03 RX ORDER — PROPOFOL 10 MG/ML
INJECTION, EMULSION INTRAVENOUS AS NEEDED
Status: DISCONTINUED | OUTPATIENT
Start: 2024-04-03 | End: 2024-04-03

## 2024-04-03 RX ORDER — ONDANSETRON 2 MG/ML
4 INJECTION INTRAMUSCULAR; INTRAVENOUS ONCE AS NEEDED
Status: DISCONTINUED | OUTPATIENT
Start: 2024-04-03 | End: 2024-04-03 | Stop reason: HOSPADM

## 2024-04-03 RX ORDER — HYDROMORPHONE HCL/PF 1 MG/ML
0.5 SYRINGE (ML) INJECTION
Status: DISCONTINUED | OUTPATIENT
Start: 2024-04-03 | End: 2024-04-03 | Stop reason: HOSPADM

## 2024-04-03 RX ORDER — LIDOCAINE HYDROCHLORIDE 20 MG/ML
INJECTION, SOLUTION EPIDURAL; INFILTRATION; INTRACAUDAL; PERINEURAL AS NEEDED
Status: DISCONTINUED | OUTPATIENT
Start: 2024-04-03 | End: 2024-04-03 | Stop reason: HOSPADM

## 2024-04-03 RX ORDER — DEXAMETHASONE SODIUM PHOSPHATE 10 MG/ML
INJECTION, SOLUTION INTRAMUSCULAR; INTRAVENOUS AS NEEDED
Status: DISCONTINUED | OUTPATIENT
Start: 2024-04-03 | End: 2024-04-03

## 2024-04-03 RX ORDER — HYDROCODONE BITARTRATE AND ACETAMINOPHEN 5; 325 MG/1; MG/1
1 TABLET ORAL EVERY 6 HOURS PRN
Status: DISCONTINUED | OUTPATIENT
Start: 2024-04-03 | End: 2024-04-03 | Stop reason: HOSPADM

## 2024-04-03 RX ORDER — FENTANYL CITRATE 50 UG/ML
INJECTION, SOLUTION INTRAMUSCULAR; INTRAVENOUS AS NEEDED
Status: DISCONTINUED | OUTPATIENT
Start: 2024-04-03 | End: 2024-04-03

## 2024-04-03 RX ORDER — SODIUM CHLORIDE 9 MG/ML
125 INJECTION, SOLUTION INTRAVENOUS CONTINUOUS
Status: DISCONTINUED | OUTPATIENT
Start: 2024-04-03 | End: 2024-04-03 | Stop reason: HOSPADM

## 2024-04-03 RX ORDER — FENTANYL CITRATE/PF 50 MCG/ML
50 SYRINGE (ML) INJECTION
Status: DISCONTINUED | OUTPATIENT
Start: 2024-04-03 | End: 2024-04-03 | Stop reason: HOSPADM

## 2024-04-03 RX ORDER — MAGNESIUM HYDROXIDE 1200 MG/15ML
LIQUID ORAL AS NEEDED
Status: DISCONTINUED | OUTPATIENT
Start: 2024-04-03 | End: 2024-04-03 | Stop reason: HOSPADM

## 2024-04-03 RX ORDER — ONDANSETRON 2 MG/ML
INJECTION INTRAMUSCULAR; INTRAVENOUS AS NEEDED
Status: DISCONTINUED | OUTPATIENT
Start: 2024-04-03 | End: 2024-04-03

## 2024-04-03 RX ORDER — CEFAZOLIN SODIUM 2 G/50ML
2000 SOLUTION INTRAVENOUS ONCE
Status: COMPLETED | OUTPATIENT
Start: 2024-04-03 | End: 2024-04-03

## 2024-04-03 RX ORDER — PROMETHAZINE HYDROCHLORIDE 25 MG/ML
6.25 INJECTION, SOLUTION INTRAMUSCULAR; INTRAVENOUS ONCE AS NEEDED
Status: DISCONTINUED | OUTPATIENT
Start: 2024-04-03 | End: 2024-04-03 | Stop reason: HOSPADM

## 2024-04-03 RX ORDER — MEPERIDINE HYDROCHLORIDE 25 MG/ML
12.5 INJECTION INTRAMUSCULAR; INTRAVENOUS; SUBCUTANEOUS ONCE AS NEEDED
Status: DISCONTINUED | OUTPATIENT
Start: 2024-04-03 | End: 2024-04-03 | Stop reason: HOSPADM

## 2024-04-03 RX ORDER — LIDOCAINE HYDROCHLORIDE 20 MG/ML
INJECTION, SOLUTION EPIDURAL; INFILTRATION; INTRACAUDAL; PERINEURAL AS NEEDED
Status: DISCONTINUED | OUTPATIENT
Start: 2024-04-03 | End: 2024-04-03

## 2024-04-03 RX ADMIN — PROPOFOL 50 MG: 10 INJECTION, EMULSION INTRAVENOUS at 11:56

## 2024-04-03 RX ADMIN — CEFAZOLIN SODIUM 2000 MG: 2 SOLUTION INTRAVENOUS at 11:52

## 2024-04-03 RX ADMIN — SODIUM CHLORIDE 125 ML/HR: 0.9 INJECTION, SOLUTION INTRAVENOUS at 10:51

## 2024-04-03 RX ADMIN — DEXAMETHASONE SODIUM PHOSPHATE 10 MG: 10 INJECTION INTRAMUSCULAR; INTRAVENOUS at 11:58

## 2024-04-03 RX ADMIN — LIDOCAINE HYDROCHLORIDE 60 MG: 20 INJECTION, SOLUTION EPIDURAL; INFILTRATION; INTRACAUDAL; PERINEURAL at 11:55

## 2024-04-03 RX ADMIN — FENTANYL CITRATE 50 MCG: 50 INJECTION INTRAMUSCULAR; INTRAVENOUS at 11:55

## 2024-04-03 RX ADMIN — ONDANSETRON 4 MG: 2 INJECTION INTRAMUSCULAR; INTRAVENOUS at 11:52

## 2024-04-03 RX ADMIN — PROPOFOL 100 MCG/KG/MIN: 10 INJECTION, EMULSION INTRAVENOUS at 11:57

## 2024-04-03 RX ADMIN — PROPOFOL 50 MG: 10 INJECTION, EMULSION INTRAVENOUS at 11:55

## 2024-04-03 NOTE — DISCHARGE INSTR - AVS FIRST PAGE
Call for fever greater than 101.5°, severe pain not relieved by pain medication, or inability to urinate.  Expect to see blood in the urine, blood in the stool and blood in the semen .  You may see swelling and bruising of the testicles and the space between the legs.  Tomorrow you may resume all usual activities.  No driving or operating machinery today.  Take Tylenol or ibuprofen for pain/discomfort..  Drink plenty of fluids.

## 2024-04-03 NOTE — ANESTHESIA PREPROCEDURE EVALUATION
Procedure:  TRANSPERINEAL MRI FUSION BIOPSY PROSTATE (Perineum)    Relevant Problems   CARDIO   (+) Essential hypertension   (+) First degree AV block   (+) Peripheral vascular disease (HCC)   (+) Pure hypercholesterolemia   (+) Type 2 diabetes mellitus with diabetic peripheral angiopathy without gangrene, without long-term current use of insulin (HCC)      ENDO   (+) Type 2 diabetes mellitus with diabetic peripheral angiopathy without gangrene, without long-term current use of insulin (HCC)      GI/HEPATIC   (+) Hepatic steatosis      /RENAL   (+) Stage 2 chronic kidney disease      Urinary   (+) Elevated PSA      Eye   (+) Glaucoma      Other   (+) Overweight        Physical Exam    Airway    Mallampati score: II  TM Distance: >3 FB  Neck ROM: full     Dental   Comment: Missing teeth, No notable dental hx     Cardiovascular  Rhythm: regular, Rate: normal, Cardiovascular exam normal    Pulmonary  Pulmonary exam normal Breath sounds clear to auscultation    Other Findings        Anesthesia Plan  ASA Score- 2     Anesthesia Type- IV sedation with anesthesia with ASA Monitors.         Additional Monitors:     Airway Plan:     Comment: GA prn.       Plan Factors-    Chart reviewed.   Existing labs reviewed. Patient summary reviewed.    Patient is not a current smoker. Patient not instructed to abstain from smoking on day of procedure. Patient did not smoke on day of surgery.    There is medical exclusion for perioperative obstructive sleep apnea risk education.        Induction- intravenous.    Postoperative Plan-     Informed Consent- Anesthetic plan and risks discussed with patient and son (Juan).

## 2024-04-03 NOTE — OP NOTE
OPERATIVE REPORT  PATIENT NAME: Tony Ramirez    :  1950  MRN: [id number]  Pt Location: [facility] OR ROOM 05    SURGERY DATE: 4/3/2024    Surgeons and Role:     * Karan Marina MD - Primary    Preop Diagnosis:  Elevated prostate specific antigen (PSA) [R97.20]    Post-Op Diagnosis Codes:     * Elevated prostate specific antigen (PSA) [R97.20]    Procedure(s):  TRANSPERINEAL MRI FUSION BIOPSY PROSTATE    Specimen(s):  * No specimens in log *    Estimated Blood Loss:   Minimal    Drains:  * No LDAs found *    Anesthesia Type:   IV Sedation with Anesthesia    Operative Indications:  Elevated prostate specific antigen (PSA) [R97.20]      Operative Findings:  Region of interest is left anterior apical zone, and I basically labeled this the left posterior medial section region of interest.  Multiple biopsies taken of this.  About 18 biopsies taken in total of the entire prostate.    Complications:   None    Procedure and Technique:  The patient is  is here for transperineal prostate biopsy guided by biplanar transrectal ultrasound using the uroNav device for MR fusion.Elevated PSA with rise to 7.84 as of January 15, 2024, MP MRI shows category 5 lesion 1.8 cm left apex posterior medial peripheral zone.   The procedure, as well as the risks of bleeding, infection, and urinary retention have been explained he gives informed consent.     The patient was brought to the operating room and identified properly.  A time-out was performed.  IV sedation was induced, and he was placed in the dorsal lithotomy position.  The perineum shaved, and IO band was used to lift the scrotum away from the perineum.  ChloraPrep was used to prep the perineum.  Care was taken when placing the patient in the dorsal lithotomy position to make sure all pressure points were protected.  . The transrectal ultrasound probe had the precision Point kit placed on it, and this was placed into the rectum.  Transverse and sagittal views were obtained.  Using  the needle guide for the device, I anesthetized both sides of the perineum with 2% xylocaine 5 cc each. .  Transverse view was obtained, Volumetric sweep was performed for the purpose of fusing the MRI images with the ultrasound images. I then performed biopsies of the region of interest and took multiple biopsies with MR fusion guidance using the guide needle in the appropriate aperture and using an 18 gauge biopsy needle..  I then performed standard transperineal prostate biopsy without MR guidance, taking the biopsies from the peripheral zone in the standard fashion of anterior medial anterior lateral and posterior medial posterior lateral and base region bilaterally.    These were all peripheral zone biopsies.  Extra biopsies were taken in zones where the initial biopsy was suboptimal tissue.  Care was taken to try to include the entire length of the prostate as much as possible for complete coverage.   Excellent prostate tissue cores were obtained, and specimens were sent to pathology.  I withdrew the guide needle and I held pressure on the perineum for 1 minutes using gauze sponges.  The perineum was then cleansed with sterile water.    I was present for the entire procedure. and A qualified resident physician was not available.    Patient Disposition:  PACU  and hemodynamically stable        SIGNATURE: Karan Marina MD  DATE: April 3, 2024  TIME: 10:22 AM

## 2024-04-03 NOTE — ANESTHESIA POSTPROCEDURE EVALUATION
Post-Op Assessment Note    CV Status:  Stable  Pain Score: 0    Pain management: adequate       Mental Status:  Arousable and sleepy   Hydration Status:  Euvolemic   PONV Controlled:  Controlled   Airway Patency:  Patent     Post Op Vitals Reviewed: Yes    No anethesia notable event occurred.    Staff: CRNA               BP   115/63   Temp   98.8   Pulse  53   Resp   12   SpO2   100% 6l fm

## 2024-04-04 ENCOUNTER — TELEPHONE (OUTPATIENT)
Dept: UROLOGY | Facility: CLINIC | Age: 74
End: 2024-04-04

## 2024-04-04 NOTE — TELEPHONE ENCOUNTER
Post Op Note    Tony Ramirez is a 73 y.o. male s/p TRANSPERINEAL MRI FUSION BIOPSY PROSTATE (Perineum)  performed 4/3/24 by Dr. Marina.  Tony Ramirez  is seen at the [place] office.     How would you rate your pain on a scale from 1 to 10, 10 being the worst pain ever? 0    Have you had a fever? No    Have your bowel movements been regular? Yes    Do you have any difficulty urinating? No    Do you have any other questions or concerns that I can address at this time?     Patient doing well at this time- no complaints of difficulty urinating or blood in the urine. Patient reminded of upcoming appointment with Dr. Springer on 4/12/24 @ 9:00 am with Dr. Springer in [place] to review results.

## 2024-04-08 PROCEDURE — 88342 IMHCHEM/IMCYTCHM 1ST ANTB: CPT | Performed by: PATHOLOGY

## 2024-04-08 PROCEDURE — 88341 IMHCHEM/IMCYTCHM EA ADD ANTB: CPT | Performed by: PATHOLOGY

## 2024-04-08 PROCEDURE — 88344 IMHCHEM/IMCYTCHM EA MLT ANTB: CPT | Performed by: PATHOLOGY

## 2024-04-08 PROCEDURE — G0416 PROSTATE BIOPSY, ANY MTHD: HCPCS | Performed by: PATHOLOGY

## 2024-04-09 ENCOUNTER — RA CDI HCC (OUTPATIENT)
Dept: OTHER | Facility: HOSPITAL | Age: 74
End: 2024-04-09

## 2024-04-09 PROBLEM — I12.9 HYPERTENSIVE CKD (CHRONIC KIDNEY DISEASE): Chronic | Status: ACTIVE | Noted: 2019-04-17

## 2024-04-09 PROBLEM — I12.9 HYPERTENSIVE CKD (CHRONIC KIDNEY DISEASE): Status: ACTIVE | Noted: 2024-04-09

## 2024-04-09 PROBLEM — E11.22 TYPE 2 DIABETES MELLITUS WITH DIABETIC CHRONIC KIDNEY DISEASE (HCC): Status: ACTIVE | Noted: 2024-04-09

## 2024-04-09 PROBLEM — E11.22 TYPE 2 DIABETES MELLITUS WITH CHRONIC KIDNEY DISEASE (HCC): Status: ACTIVE | Noted: 2024-04-09

## 2024-04-09 NOTE — PROGRESS NOTES
HCC coding opportunities          Chart Reviewed number of suggestions sent to Provider: 2  E11.22  I12.9     Patients Insurance     Medicare Insurance: [employer]

## 2024-04-12 ENCOUNTER — OFFICE VISIT (OUTPATIENT)
Dept: UROLOGY | Facility: AMBULATORY SURGERY CENTER | Age: 74
End: 2024-04-12
Payer: COMMERCIAL

## 2024-04-12 VITALS
OXYGEN SATURATION: 95 % | BODY MASS INDEX: 30.73 KG/M2 | HEART RATE: 55 BPM | WEIGHT: 179 LBS | SYSTOLIC BLOOD PRESSURE: 150 MMHG | DIASTOLIC BLOOD PRESSURE: 88 MMHG

## 2024-04-12 DIAGNOSIS — C61 PROSTATE CANCER (HCC): Primary | ICD-10-CM

## 2024-04-12 PROCEDURE — 99215 OFFICE O/P EST HI 40 MIN: CPT | Performed by: UROLOGY

## 2024-04-12 NOTE — PROGRESS NOTES
4/12/2024    Tony Ramirez  1950  [id number]        Assessment  Grade 3 prostate cancer    Plan  We reviewed his PSA trend and also reviewed his MRI findings and images in detail indicating a large prostate with median lobe.  Reviewed his biopsy results indicating grade 3 and grade 2 findings including the region of interest on MRI and contralateral biopsies as well.  We reviewed the grading and staging of prostate cancer in detail.  He understands that he has intermediate risk unfavorable prostate cancer.  We discussed that fortunately.  It appears to be localized low stage based on his MRI results, although admittedly this does not include full body evaluation.  PSA less than 10 also supports this.    We discussed the potential for further restratification with genomic testing such as Prolaris or decipher.  We discussed that this is typically done if patient is unsure about proceeding with treatment or prognosis in general.    Finally we discussed options at this point including observation, surgery, radiation therapy based treatment.  We also discussed cryotherapy.  Technique, risk, benefits of surgery reviewed in detail.  Primary side effects including stress incontinence and impotence reviewed in detail as well.    Discussed radiation therapy, typical treatment course, addition of androgen deprivation therapy as well and the role of androgen deprivation therapy reviewed in detail including side effects risk and benefits.  He is concerned about sexual function and seems leaning towards radiation therapy.    We discussed that cryotherapy is not recommended as a primary treatment but can be used in certain instances for salvage if needed.    He would like radiation oncology consultation to discuss this further.    However he then noted that he will be primarily living in [facility] and has concerned about coordinate all of this.  I advised that he should seek out a urologist and radiation  oncologist in [place] if that is the case as I believe the services should be available in that location.  Additionally, he can still consider surgery if desired.    Informational literature given.  Will send Prolaris testing and await patient decision regarding follow-up either locally or in [place] and treatment desired as well.    All questions answered and he agrees with the plan.        History of Present Illness  Tony is a 73 y.o. male previously seen due to BPH and gross hematuria.  He had cystoscopy in 2021 revealing significant prostate enlargement with median lobe.  Since that time, PSA had been rising from baseline of 2 up to 6 and 7.  MRI performed revealing PI-RADS 5, 71 cc gland.  He returns today after MRI fusion biopsy for biopsy results and discussion.    No current complaints.          Review of Systems  Review of Systems   Constitutional: Negative.    HENT: Negative.     Respiratory: Negative.     Cardiovascular: Negative.    Gastrointestinal: Negative.    Genitourinary:         As per HPI   Musculoskeletal: Negative.    Skin: Negative.    Neurological: Negative.    Hematological: Negative.          Past Medical History  Past Medical History:   Diagnosis Date    Diabetes mellitus (HCC)     diet controlled    Hypertension     Prediabetes        Past Social History  Past Surgical History:   Procedure Laterality Date    CATARACT EXTRACTION Bilateral     COLONOSCOPY      AL PROSTATE NEEDLE BIOPSY ANY APPROACH N/A 4/3/2024    Procedure: TRANSPERINEAL MRI FUSION BIOPSY PROSTATE;  Surgeon: Karan Marina MD;  Location: [facility] OR;  Service: Urology       Past Family History  Family History   Problem Relation Age of Onset    No Known Problems Mother     No Known Problems Father     Diabetes Sister     Diabetes Brother     No Known Problems Son     No Known Problems Daughter     No Known Problems Sister     No Known Problems Sister     No Known Problems Sister     No Known Problems Sister     No  Known Problems Brother     No Known Problems Brother     No Known Problems Brother     No Known Problems Son     No Known Problems Son     No Known Problems Daughter        Past Social history  Social History     Socioeconomic History    Marital status: /Civil Union     Spouse name: Not on file    Number of children: Not on file    Years of education: Not on file    Highest education level: Not on file   Occupational History    Not on file   Tobacco Use    Smoking status: Never     Passive exposure: Never    Smokeless tobacco: Never   Vaping Use    Vaping status: Never Used   Substance and Sexual Activity    Alcohol use: Not Currently     Alcohol/week: 1.0 standard drink of alcohol     Types: 1 Standard drinks or equivalent per week    Drug use: No    Sexual activity: Not Currently   Other Topics Concern    Not on file   Social History Narrative    Not on file     Social Determinants of Health     Financial Resource Strain: Low Risk  (1/11/2023)    Overall Financial Resource Strain (CARDIA)     Difficulty of Paying Living Expenses: Not hard at all   Food Insecurity: No Food Insecurity (3/25/2021)    Hunger Vital Sign     Worried About Running Out of Food in the Last Year: Never true     Ran Out of Food in the Last Year: Never true   Transportation Needs: No Transportation Needs (1/11/2023)    PRAPARE - Transportation     Lack of Transportation (Medical): No     Lack of Transportation (Non-Medical): No   Physical Activity: Inactive (2/17/2021)    Exercise Vital Sign     Days of Exercise per Week: 0 days     Minutes of Exercise per Session: 0 min   Stress: No Stress Concern Present (2/17/2021)    [employer] - Occupational Stress Questionnaire     Feeling of Stress : Not at all   Social Connections: Socially Isolated (2/17/2021)    Social Connection and Isolation Panel [NHANES]     Frequency of Communication with Friends and Family: Once a week     Frequency of Social Gatherings  with Friends and Family: Once a week     Attends Mosque Services: Never     Active Member of Clubs or Organizations: Not on file     Attends Club or Organization Meetings: Never     Marital Status:    Intimate Partner Violence: Not At Risk (2/17/2021)    Humiliation, Afraid, Rape, and Kick questionnaire     Fear of Current or Ex-Partner: No     Emotionally Abused: No     Physically Abused: No     Sexually Abused: No   Housing Stability: Not on file     Social History     Tobacco Use   Smoking Status Never    Passive exposure: Never   Smokeless Tobacco Never       Current Medications  Current Outpatient Medications   Medication Sig Dispense Refill    latanoprost (XALATAN) 0.005 % ophthalmic solution Administer 1 drop into the left eye daily (Patient not taking: Reported on 3/28/2024)      polymyxin b-trimethoprim (POLYTRIM) ophthalmic solution Administer 1 drop into the left eye (Patient not taking: Reported on 3/28/2024)      prednisoLONE acetate (PRED FORTE) 1 % ophthalmic suspension Administer 1 drop into the left eye (Patient not taking: Reported on 3/28/2024)       No current facility-administered medications for this visit.       Allergies  No Known Allergies    Past Medical History, Social History, Family History, medications and allergies were reviewed.    Vitals  Vitals:    04/12/24 0847   BP: 150/88   BP Location: Left arm   Patient Position: Sitting   Cuff Size: Adult   Pulse: 55   SpO2: 95%   Weight: 81.2 kg (179 lb)       Physical Exam  Physical Exam      Results  Lab Results   Component Value Date    PSA 7.84 (H) 01/15/2024    PSA 6.8 (H) 02/03/2023    PSA 6.2 (H) 12/23/2022     Lab Results   Component Value Date    CALCIUM 9.2 04/01/2024    K 4.1 04/01/2024    CO2 30 04/01/2024     04/01/2024    BUN 16 04/01/2024    CREATININE 1.08 04/01/2024     Lab Results   Component Value Date    WBC 4.18 (L) 04/01/2024    HGB 13.7 04/01/2024    HCT 43.6 04/01/2024    MCV 81 (L) 04/01/2024    PLT  235 04/01/2024

## 2024-04-12 NOTE — TELEPHONE ENCOUNTER
PT called and requesting call back from Dr Springer regarding appt from earlier today     Pt call back-[phone]

## 2024-04-12 NOTE — TELEPHONE ENCOUNTER
Returned patient's call.  He had questions about [place] score and grade.  I reviewed with him that he has a Niko 7, but grade 3, more aggressive type of [place] 7.  Plan remains check Prolaris testing, radiation oncology consultation.  He will determine whether he wishes to follow-up here or in [place].

## 2024-04-16 ENCOUNTER — OFFICE VISIT (OUTPATIENT)
Dept: FAMILY MEDICINE CLINIC | Facility: CLINIC | Age: 74
End: 2024-04-16
Payer: COMMERCIAL

## 2024-04-16 VITALS
WEIGHT: 179 LBS | BODY MASS INDEX: 30.56 KG/M2 | HEART RATE: 59 BPM | OXYGEN SATURATION: 98 % | HEIGHT: 64 IN | TEMPERATURE: 96.5 F | RESPIRATION RATE: 18 BRPM | SYSTOLIC BLOOD PRESSURE: 128 MMHG | DIASTOLIC BLOOD PRESSURE: 80 MMHG

## 2024-04-16 DIAGNOSIS — I12.9 HYPERTENSIVE KIDNEY DISEASE WITH STAGE 2 CHRONIC KIDNEY DISEASE: Chronic | ICD-10-CM

## 2024-04-16 DIAGNOSIS — C61 PROSTATE CANCER (HCC): ICD-10-CM

## 2024-04-16 DIAGNOSIS — N18.2 TYPE 2 DIABETES MELLITUS WITH STAGE 2 CHRONIC KIDNEY DISEASE, WITHOUT LONG-TERM CURRENT USE OF INSULIN  (HCC): ICD-10-CM

## 2024-04-16 DIAGNOSIS — E11.22 TYPE 2 DIABETES MELLITUS WITH STAGE 2 CHRONIC KIDNEY DISEASE, WITHOUT LONG-TERM CURRENT USE OF INSULIN  (HCC): ICD-10-CM

## 2024-04-16 DIAGNOSIS — E78.00 PURE HYPERCHOLESTEROLEMIA: ICD-10-CM

## 2024-04-16 DIAGNOSIS — I73.9 PERIPHERAL VASCULAR DISEASE (HCC): ICD-10-CM

## 2024-04-16 DIAGNOSIS — E78.2 MIXED HYPERLIPIDEMIA: ICD-10-CM

## 2024-04-16 DIAGNOSIS — Z00.00 MEDICARE ANNUAL WELLNESS VISIT, SUBSEQUENT: ICD-10-CM

## 2024-04-16 DIAGNOSIS — N18.2 HYPERTENSIVE KIDNEY DISEASE WITH STAGE 2 CHRONIC KIDNEY DISEASE: Chronic | ICD-10-CM

## 2024-04-16 DIAGNOSIS — E11.51 TYPE 2 DIABETES MELLITUS WITH DIABETIC PERIPHERAL ANGIOPATHY WITHOUT GANGRENE, WITHOUT LONG-TERM CURRENT USE OF INSULIN (HCC): Primary | ICD-10-CM

## 2024-04-16 PROBLEM — B35.3 TINEA PEDIS OF BOTH FEET: Status: RESOLVED | Noted: 2024-01-09 | Resolved: 2024-04-16

## 2024-04-16 LAB — SL AMB POCT HEMOGLOBIN AIC: 7 (ref ?–6.5)

## 2024-04-16 PROCEDURE — 99214 OFFICE O/P EST MOD 30 MIN: CPT | Performed by: NURSE PRACTITIONER

## 2024-04-16 PROCEDURE — 83036 HEMOGLOBIN GLYCOSYLATED A1C: CPT | Performed by: NURSE PRACTITIONER

## 2024-04-16 PROCEDURE — G0439 PPPS, SUBSEQ VISIT: HCPCS | Performed by: NURSE PRACTITIONER

## 2024-04-16 RX ORDER — ROSUVASTATIN CALCIUM 10 MG/1
10 TABLET, COATED ORAL DAILY
Qty: 30 TABLET | Refills: 5 | Status: SHIPPED | OUTPATIENT
Start: 2024-04-16 | End: 2024-04-24

## 2024-04-16 NOTE — ASSESSMENT & PLAN NOTE
Lab Results   Component Value Date    HGBA1C 7.0 (A) 04/16/2024   The patient presents to the office today for follow-up.  His current hemoglobin A1c is 7.0.  His previous in January was 7.2.  Upon review of his medical record the patient has had a diabetes diagnosis as far back as 2019.  I have no prior records.  The patient has never been on medications for diabetes in the past.  At his visit in January we did discuss medications however the patient wanted to try to lower this A1c with diet.  He has not been successful.  Medications used to treat diabetes were discussed.  Metformin 500 mg twice daily was sent to his pharmacy.  Side effects discussed.  The patient has not had an eye exam for greater than 6 years.  I will send him to [facility] for a full eye exam.  Diabetic foot exam performed in the office today.  The patient will be started on a statin.

## 2024-04-16 NOTE — PATIENT INSTRUCTIONS
Medicare Preventive Visit Patient Instructions  Thank you for completing your Welcome to Medicare Visit or Medicare Annual Wellness Visit today. Your next wellness visit will be due in one year (4/17/2025).  The screening/preventive services that you may require over the next 5-10 years are detailed below. Some tests may not apply to you based off risk factors and/or age. Screening tests ordered at today's visit but not completed yet may show as past due. Also, please note that scanned in results may not display below.  Preventive Screenings:  Service Recommendations Previous Testing/Comments   Colorectal Cancer Screening  Colonoscopy    Fecal Occult Blood Test (FOBT)/Fecal Immunochemical Test (FIT)  Fecal DNA/Cologuard Test  Flexible Sigmoidoscopy Age: 45-75 years old   Colonoscopy: every 10 years (May be performed more frequently if at higher risk)  OR  FOBT/FIT: every 1 year  OR  Cologuard: every 3 years  OR  Sigmoidoscopy: every 5 years  Screening may be recommended earlier than age 45 if at higher risk for colorectal cancer. Also, an individualized decision between you and your healthcare provider will decide whether screening between the ages of 76-85 would be appropriate. Colonoscopy: 01/09/2020  FOBT/FIT: Not on file  Cologuard: 12/28/2022  Sigmoidoscopy: Not on file    Screening Current     Prostate Cancer Screening Individualized decision between patient and health care provider in men between ages of 55-69   Medicare will cover every 12 months beginning on the day after your 50th birthday PSA: 7.84 ng/mL     History Prostate Cancer     Hepatitis C Screening Once for adults born between 1945 and 1965  More frequently in patients at high risk for Hepatitis C Hep C Antibody: 12/27/2019    Screening Current   Diabetes Screening 1-2 times per year if you're at risk for diabetes or have pre-diabetes Fasting glucose: 118 mg/dL (4/1/2024)  A1C: 7.2 (1/9/2024)  Screening Not Indicated  History Diabetes    Cholesterol Screening Once every 5 years if you don't have a lipid disorder. May order more often based on risk factors. Lipid panel: 01/15/2024  Screening Not Indicated  History Lipid Disorder      Other Preventive Screenings Covered by Medicare:  Abdominal Aortic Aneurysm (AAA) Screening: covered once if your at risk. You're considered to be at risk if you have a family history of AAA or a male between the age of 65-75 who smoking at least 100 cigarettes in your lifetime.  Lung Cancer Screening: covers low dose CT scan once per year if you meet all of the following conditions: (1) Age 55-77; (2) No signs or symptoms of lung cancer; (3) Current smoker or have quit smoking within the last 15 years; (4) You have a tobacco smoking history of at least 20 pack years (packs per day x number of years you smoked); (5) You get a written order from a healthcare provider.  Glaucoma Screening: covered annually if you're considered high risk: (1) You have diabetes OR (2) Family history of glaucoma OR (3)  aged 50 and older OR (4)  American aged 65 and older  Osteoporosis Screening: covered every 2 years if you meet one of the following conditions: (1) Have a vertebral abnormality; (2) On glucocorticoid therapy for more than 3 months; (3) Have primary hyperparathyroidism; (4) On osteoporosis medications and need to assess response to drug therapy.  HIV Screening: covered annually if you're between the age of 15-65. Also covered annually if you are younger than 15 and older than 65 with risk factors for HIV infection. For pregnant patients, it is covered up to 3 times per pregnancy.    Immunizations:  Immunization Recommendations   Influenza Vaccine Annual influenza vaccination during flu season is recommended for all persons aged >= 6 months who do not have contraindications   Pneumococcal Vaccine   * Pneumococcal conjugate vaccine = PCV13 (Prevnar 13), PCV15 (Vaxneuvance), PCV20 (Prevnar 20)  *  Pneumococcal polysaccharide vaccine = PPSV23 (Pneumovax) Adults 19-63 yo with certain risk factors or if 65+ yo  If never received any pneumonia vaccine: recommend Prevnar 20 (PCV20)  Give PCV20 if previously received 1 dose of PCV13 or PPSV23   Hepatitis B Vaccine 3 dose series if at intermediate or high risk (ex: diabetes, end stage renal disease, liver disease)   Respiratory syncytial virus (RSV) Vaccine - COVERED BY MEDICARE PART D  * RSVPreF3 (Arexvy) CDC recommends that adults 60 years of age and older may receive a single dose of RSV vaccine using shared clinical decision-making (SCDM)   Tetanus (Td) Vaccine - COST NOT COVERED BY MEDICARE PART B Following completion of primary series, a booster dose should be given every 10 years to maintain immunity against tetanus. Td may also be given as tetanus wound prophylaxis.   Tdap Vaccine - COST NOT COVERED BY MEDICARE PART B Recommended at least once for all adults. For pregnant patients, recommended with each pregnancy.   Shingles Vaccine (Shingrix) - COST NOT COVERED BY MEDICARE PART B  2 shot series recommended in those 19 years and older who have or will have weakened immune systems or those 50 years and older     Health Maintenance Due:      Topic Date Due   • Colorectal Cancer Screening  01/09/2030   • Hepatitis C Screening  Completed     Immunizations Due:      Topic Date Due   • Pneumococcal Vaccine: 65+ Years (1 of 2 - PCV) Never done   • Influenza Vaccine (1) Never done   • COVID-19 Vaccine (1 - 2023-24 season) Never done     Advance Directives   What are advance directives?  Advance directives are legal documents that state your wishes and plans for medical care. These plans are made ahead of time in case you lose your ability to make decisions for yourself. Advance directives can apply to any medical decision, such as the treatments you want, and if you want to donate organs.   What are the types of advance directives?  There are many types of advance  directives, and each state has rules about how to use them. You may choose a combination of any of the following:  Living will:  This is a written record of the treatment you want. You can also choose which treatments you do not want, which to limit, and which to stop at a certain time. This includes surgery, medicine, IV fluid, and tube feedings.   Durable power of  for healthcare (DPAHC):  This is a written record that states who you want to make healthcare choices for you when you are unable to make them for yourself. This person, called a proxy, is usually a family member or a friend. You may choose more than 1 proxy.  Do not resuscitate (DNR) order:  A DNR order is used in case your heart stops beating or you stop breathing. It is a request not to have certain forms of treatment, such as CPR. A DNR order may be included in other types of advance directives.  Medical directive:  This covers the care that you want if you are in a coma, near death, or unable to make decisions for yourself. You can list the treatments you want for each condition. Treatment may include pain medicine, surgery, blood transfusions, dialysis, IV or tube feedings, and a ventilator (breathing machine).  Values history:  This document has questions about your views, beliefs, and how you feel and think about life. This information can help others choose the care that you would choose.  Why are advance directives important?  An advance directive helps you control your care. Although spoken wishes may be used, it is better to have your wishes written down. Spoken wishes can be misunderstood, or not followed. Treatments may be given even if you do not want them. An advance directive may make it easier for your family to make difficult choices about your care.   Weight Management   Why it is important to manage your weight:  Being overweight increases your risk of health conditions such as heart disease, high blood pressure, type 2  diabetes, and certain types of cancer. It can also increase your risk for osteoarthritis, sleep apnea, and other respiratory problems. Aim for a slow, steady weight loss. Even a small amount of weight loss can lower your risk of health problems.  How to lose weight safely:  A safe and healthy way to lose weight is to eat fewer calories and get regular exercise. You can lose up about 1 pound a week by decreasing the number of calories you eat by 500 calories each day.   Healthy meal plan for weight management:  A healthy meal plan includes a variety of foods, contains fewer calories, and helps you stay healthy. A healthy meal plan includes the following:  Eat whole-grain foods more often.  A healthy meal plan should contain fiber. Fiber is the part of grains, fruits, and vegetables that is not broken down by your body. Whole-grain foods are healthy and provide extra fiber in your diet. Some examples of whole-grain foods are whole-wheat breads and pastas, oatmeal, brown rice, and bulgur.  Eat a variety of vegetables every day.  Include dark, leafy greens such as spinach, kale, maye greens, and mustard greens. Eat yellow and orange vegetables such as carrots, sweet potatoes, and winter squash.   Eat a variety of fruits every day.  Choose fresh or canned fruit (canned in its own juice or light syrup) instead of juice. Fruit juice has very little or no fiber.  Eat low-fat dairy foods.  Drink fat-free (skim) milk or 1% milk. Eat fat-free yogurt and low-fat cottage cheese. Try low-fat cheeses such as mozzarella and other reduced-fat cheeses.  Choose meat and other protein foods that are low in fat.  Choose beans or other legumes such as split peas or lentils. Choose fish, skinless poultry (chicken or turkey), or lean cuts of red meat (beef or pork). Before you cook meat or poultry, cut off any visible fat.   Use less fat and oil.  Try baking foods instead of frying them. Add less fat, such as margarine, sour cream,  regular salad dressing and mayonnaise to foods. Eat fewer high-fat foods. Some examples of high-fat foods include french fries, doughnuts, ice cream, and cakes.  Eat fewer sweets.  Limit foods and drinks that are high in sugar. This includes candy, cookies, regular soda, and sweetened drinks.  Exercise:  Exercise at least 30 minutes per day on most days of the week. Some examples of exercise include walking, biking, dancing, and swimming. You can also fit in more physical activity by taking the stairs instead of the elevator or parking farther away from stores. Ask your healthcare provider about the best exercise plan for you.      © Copyright [employer] 2018 Information is for End User's use only and may not be sold, redistributed or otherwise used for commercial purposes. All illustrations and images included in CareNotes® are the copyrighted property of [employer] or [employer]

## 2024-04-16 NOTE — ASSESSMENT & PLAN NOTE
Patient did see urology and a biopsy was performed at the end of March.  The patient does have a diagnosis of prostate cancer.  Treatment options were discussed.  The patient spends 6 months out of the year in [place] and will be leaving to go back down there at the end of April.  He may coordinate care down there.

## 2024-04-16 NOTE — PROGRESS NOTES
Assessment and Plan:     Problem List Items Addressed This Visit        Cardiovascular and Mediastinum    Peripheral vascular disease (HCC)     Stable at present.  Minimal lower extremity swelling.  Dopplers were negative in the past.  Normal pulses.            Endocrine    Type 2 diabetes mellitus with diabetic chronic kidney disease (HCC)       Lab Results   Component Value Date    HGBA1C 7.0 (A) 04/16/2024   The patient presents to the office today for follow-up.  His current hemoglobin A1c is 7.0.  His previous in January was 7.2.  Upon review of his medical record the patient has had a diabetes diagnosis as far back as 2019.  I have no prior records.  The patient has never been on medications for diabetes in the past.  At his visit in January we did discuss medications however the patient wanted to try to lower this A1c with diet.  He has not been successful.  Medications used to treat diabetes were discussed.  Metformin 500 mg twice daily was sent to his pharmacy.  Side effects discussed.  The patient has not had an eye exam for greater than 6 years.  I will send him to [facility] for a full eye exam.  Diabetic foot exam performed in the office today.  The patient will be started on a statin.         Relevant Medications    metFORMIN (GLUCOPHAGE) 500 mg tablet       Genitourinary    Hypertensive CKD (chronic kidney disease) (Chronic)     Lab Results   Component Value Date    EGFR 68 04/01/2024    EGFR 71 01/15/2024    EGFR 76 12/23/2022    CREATININE 1.08 04/01/2024    CREATININE 1.04 01/15/2024    CREATININE 0.99 12/23/2022   Blood pressure in the office today is 128/80.  He continues to monitor his diet at home for salt.  He had previously been on amlodipine but has stopped that medication on his own.         Prostate cancer (HCC)     Patient did see urology and a biopsy was performed at the end of March.  The patient does have a diagnosis of prostate cancer.  Treatment options were discussed.  The  patient spends 6 months out of the year in [place] and will be leaving to go back down there at the end of April.  He may coordinate care down there.            Other    Pure hypercholesterolemia     The patient's lipid panel from January 15, 2024 was discussed with him.  At that time I did recommend a statin and the patient declined.  I again discussed this with him today in the office.  He is agreeable to starting on a statin.  Crestor 10 mg was sent to his pharmacy.  I will repeat his lipid panel prior to his next appointment    Component      Latest Ref Rng 1/15/2024   Cholesterol      See Comment mg/dL 159    Triglycerides      See Comment mg/dL 162 (H)    HDL      >=40 mg/dL 34 (L)    LDL Calculated      0 - 100 mg/dL 93       Legend:  (H) High  (L) Low    The 10-year ASCVD risk score (Enoc WOODSON, et al., 2019) is: 22.9%    Values used to calculate the score:      Age: 73 years      Sex: Male      Is Non- : Yes      Diabetic: Yes      Tobacco smoker: No      Systolic Blood Pressure: 128 mmHg      Is BP treated: No      HDL Cholesterol: 34 mg/dL      Total Cholesterol: 159 mg/dL             Relevant Medications    rosuvastatin (CRESTOR) 10 MG tablet   Other Visit Diagnoses     Type 2 diabetes mellitus with diabetic peripheral angiopathy without gangrene, without long-term current use of insulin (HCC)    -  Primary    Relevant Medications    metFORMIN (GLUCOPHAGE) 500 mg tablet    Other Relevant Orders    POCT hemoglobin A1c (Completed)    Ambulatory Referral to Ophthalmology    Albumin / creatinine urine ratio    Comprehensive metabolic panel    Hemoglobin A1C    Lipid Panel with Direct LDL reflex    Medicare annual wellness visit, subsequent        Relevant Medications    metFORMIN (GLUCOPHAGE) 500 mg tablet    Mixed hyperlipidemia        Relevant Medications    rosuvastatin (CRESTOR) 10 MG tablet          Depression Screening and Follow-up Plan: Patient was screened for depression  during today's encounter. They screened negative with a PHQ-2 score of 0.      Preventive health issues were discussed with patient, and age appropriate screening tests were ordered as noted in patient's After Visit Summary.  Personalized health advice and appropriate referrals for health education or preventive services given if needed, as noted in patient's After Visit Summary.     History of Present Illness:     Patient presents for a Medicare Wellness Visit    The patient presents to the office today for an annual Medicare wellness visit and follow-up.  Patient was recently diagnosed with prostate cancer.  Treatment options were discussed with him by urology.  He spends 6 months in [place] and will be heading there at the end of April.  He will find a urologist and a radiation oncologist in that area to further discuss treatment.  Patient's hemoglobin A1c continues to be elevated.  He is agreeable today to starting on treatment for diabetes.  Metformin 500 mg twice daily was discussed with him and sent to his pharmacy.  The patient should also be on a statin and this was discussed with him as well.  Crestor 10 mg daily sent to his pharmacy.  I did recommend that he start the Crestor in 2 weeks as he will also be starting metformin tomorrow.  Diabetic foot exam in the office today is normal.  I did refer him to [facility] for a full diabetic eye exam.  His last eye exam was more than 6 years ago.  The patient will return to this area in November.  He will have blood work done prior to his visit.       Patient Care Team:  DORIAN Valdes as PCP - General (Internal Medicine)     Review of Systems:     Review of Systems   Constitutional: Negative.  Negative for fatigue.   HENT: Negative.  Negative for congestion, postnasal drip, rhinorrhea and trouble swallowing.    Eyes: Negative.  Negative for visual disturbance.   Respiratory: Negative.  Negative for choking and shortness of breath.    Cardiovascular:  Negative.  Negative for chest pain.   Gastrointestinal: Negative.    Endocrine: Negative.    Genitourinary: Negative.    Musculoskeletal: Negative.  Negative for arthralgias, back pain, myalgias and neck pain.   Skin: Negative.    Neurological:  Negative for dizziness and headaches.   Psychiatric/Behavioral: Negative.          Problem List:     Patient Active Problem List   Diagnosis   • Hepatic steatosis   • First degree AV block   • Sigmoid diverticulosis   • Overweight   • Tremor of both hands   • History of COVID-19   • Pure hypercholesterolemia   • Peripheral vascular disease (HCC)   • Prostate cancer (HCC)   • Glaucoma   • Type 2 diabetes mellitus with diabetic chronic kidney disease (HCC)   • Hypertensive CKD (chronic kidney disease)      Past Medical and Surgical History:     Past Medical History:   Diagnosis Date   • Diabetes mellitus (HCC)     diet controlled   • Hypertension    • Prediabetes    • Tinea pedis of both feet 01/09/2024     Past Surgical History:   Procedure Laterality Date   • CATARACT EXTRACTION Bilateral    • COLONOSCOPY     • OR PROSTATE NEEDLE BIOPSY ANY APPROACH N/A 4/3/2024    Procedure: TRANSPERINEAL MRI FUSION BIOPSY PROSTATE;  Surgeon: Karan Marina MD;  Location: [facility];  Service: Urology      Family History:     Family History   Problem Relation Age of Onset   • No Known Problems Mother    • No Known Problems Father    • Diabetes Sister    • Diabetes Brother    • No Known Problems Son    • No Known Problems Daughter    • No Known Problems Sister    • No Known Problems Sister    • No Known Problems Sister    • No Known Problems Sister    • No Known Problems Brother    • No Known Problems Brother    • No Known Problems Brother    • No Known Problems Son    • No Known Problems Son    • No Known Problems Daughter       Social History:     Social History     Socioeconomic History   • Marital status: /Civil Union     Spouse name: None   • Number of children: None   • Years of  education: None   • Highest education level: None   Occupational History   • None   Tobacco Use   • Smoking status: Never     Passive exposure: Never   • Smokeless tobacco: Never   Vaping Use   • Vaping status: Never Used   Substance and Sexual Activity   • Alcohol use: Not Currently     Alcohol/week: 1.0 standard drink of alcohol     Types: 1 Standard drinks or equivalent per week   • Drug use: No   • Sexual activity: Not Currently   Other Topics Concern   • None   Social History Narrative   • None     Social Determinants of Health     Financial Resource Strain: Low Risk  (1/11/2023)    Overall Financial Resource Strain (CARDIA)    • Difficulty of Paying Living Expenses: Not hard at all   Food Insecurity: No Food Insecurity (4/16/2024)    Hunger Vital Sign    • Worried About Running Out of Food in the Last Year: Never true    • Ran Out of Food in the Last Year: Never true   Transportation Needs: No Transportation Needs (4/16/2024)    PRAPARE - Transportation    • Lack of Transportation (Medical): No    • Lack of Transportation (Non-Medical): No   Physical Activity: Inactive (2/17/2021)    Exercise Vital Sign    • Days of Exercise per Week: 0 days    • Minutes of Exercise per Session: 0 min   Stress: No Stress Concern Present (2/17/2021)    [employer] - Occupational Stress Questionnaire    • Feeling of Stress : Not at all   Social Connections: Socially Isolated (2/17/2021)    Social Connection and Isolation Panel [NHANES]    • Frequency of Communication with Friends and Family: Once a week    • Frequency of Social Gatherings with Friends and Family: Once a week    • Attends Christian Services: Never    • Active Member of Clubs or Organizations: Not on file    • Attends Club or Organization Meetings: Never    • Marital Status:    Intimate Partner Violence: Not At Risk (2/17/2021)    Humiliation, Afraid, Rape, and Kick questionnaire    • Fear of Current or Ex-Partner: No    •  Emotionally Abused: No    • Physically Abused: No    • Sexually Abused: No   Housing Stability: Low Risk  (4/16/2024)    Housing Stability Vital Sign    • Unable to Pay for Housing in the Last Year: No    • Number of Places Lived in the Last Year: 1    • Unstable Housing in the Last Year: No      Medications and Allergies:     Current Outpatient Medications   Medication Sig Dispense Refill   • metFORMIN (GLUCOPHAGE) 500 mg tablet Take 1 tablet (500 mg total) by mouth 2 (two) times a day with meals 180 tablet 2   • rosuvastatin (CRESTOR) 10 MG tablet Take 1 tablet (10 mg total) by mouth daily 30 tablet 5     No current facility-administered medications for this visit.     No Known Allergies   Immunizations:     Immunization History   Administered Date(s) Administered   • Hep B, adult 01/21/2020, 01/21/2020, 02/25/2020, 02/25/2020      Health Maintenance:         Topic Date Due   • Colorectal Cancer Screening  01/09/2030   • Hepatitis C Screening  Completed         Topic Date Due   • Pneumococcal Vaccine: 65+ Years (1 of 2 - PCV) Never done   • Influenza Vaccine (1) Never done   • COVID-19 Vaccine (1 - 2023-24 season) Never done      Medicare Screening Tests and Risk Assessments:     Tony is here for his Subsequent Wellness visit. Last Medicare Wellness visit information reviewed, patient interviewed, no change since last AWV.     Health Risk Assessment:   Patient rates overall health as very good. Patient feels that their physical health rating is same. Patient is very satisfied with their life. Eyesight was rated as same. Hearing was rated as same. Patient feels that their emotional and mental health rating is same. Patients states they are never, rarely angry. Patient states they are never, rarely unusually tired/fatigued. Pain experienced in the last 7 days has been none. Patient states that he has experienced no weight loss or gain in last 6 months.     Depression Screening:   PHQ-2 Score: 0      Fall Risk  Screening:   In the past year, patient has experienced: no history of falling in past year      Home Safety:  Patient does not have trouble with stairs inside or outside of their home. Patient has working smoke alarms and has working carbon monoxide detector. Home safety hazards include: none.     Nutrition:   Current diet is Regular.     Medications:   Patient is not currently taking any over-the-counter supplements. Patient is able to manage medications.     Activities of Daily Living (ADLs)/Instrumental Activities of Daily Living (IADLs):   Walk and transfer into and out of bed and chair?: Yes  Dress and groom yourself?: Yes    Bathe or shower yourself?: Yes    Feed yourself? Yes  Do your laundry/housekeeping?: Yes  Manage your money, pay your bills and track your expenses?: Yes  Make your own meals?: Yes    Do your own shopping?: Yes    Previous Hospitalizations:   Any hospitalizations or ED visits within the last 12 months?: No      Advance Care Planning:   Living will: Yes    Durable POA for healthcare: Yes    Advanced directive: Yes    Five wishes given: No      Cognitive Screening:   Provider or family/friend/caregiver concerned regarding cognition?: No    PREVENTIVE SCREENINGS      Cardiovascular Screening:    General: History Lipid Disorder and Screening Current      Diabetes Screening:     General: History Diabetes and Screening Current      Colorectal Cancer Screening:     General: Screening Current      Prostate Cancer Screening:    General: History Prostate Cancer      Osteoporosis Screening:    General: Screening Not Indicated      Abdominal Aortic Aneurysm (AAA) Screening:    Risk factors include: age between 65-76 yo        General: Screening Not Indicated      Lung Cancer Screening:     General: Screening Not Indicated      Hepatitis C Screening:    General: Screening Current    Screening, Brief Intervention, and Referral to Treatment (SBIRT)    Screening  Typical number of drinks in a day:  "0  Typical number of drinks in a week: 0  Interpretation: Low risk drinking behavior.    Single Item Drug Screening:  How often have you used an illegal drug (including marijuana) or a prescription medication for non-medical reasons in the past year? never    Single Item Drug Screen Score: 0  Interpretation: Negative screen for possible drug use disorder    No results found.     Physical Exam:     /80   Pulse 59   Temp (!) 96.5 °F (35.8 °C) (Tympanic)   Resp 18   Ht 5' 4\" (1.626 m)   Wt 81.2 kg (179 lb)   SpO2 98%   BMI 30.73 kg/m²     Physical Exam  Vitals and nursing note reviewed.   Constitutional:       Appearance: Normal appearance. He is well-developed. He is obese.   HENT:      Head: Normocephalic and atraumatic.      Right Ear: Tympanic membrane, ear canal and external ear normal. There is no impacted cerumen.      Left Ear: Tympanic membrane, ear canal and external ear normal. There is no impacted cerumen.      Nose: Nose normal.      Mouth/Throat:      Mouth: Mucous membranes are moist.      Pharynx: Oropharynx is clear.   Eyes:      Conjunctiva/sclera: Conjunctivae normal.   Cardiovascular:      Rate and Rhythm: Normal rate and regular rhythm.      Pulses: Normal pulses. no weak pulses.           Dorsalis pedis pulses are 2+ on the right side and 2+ on the left side.        Posterior tibial pulses are 2+ on the right side and 2+ on the left side.      Heart sounds: Normal heart sounds. No murmur heard.  Pulmonary:      Effort: Pulmonary effort is normal. No respiratory distress.      Breath sounds: Normal breath sounds.   Abdominal:      General: Bowel sounds are normal. There is no distension.      Palpations: Abdomen is soft. There is no mass.      Tenderness: There is no abdominal tenderness. There is no guarding or rebound.      Hernia: No hernia is present.   Musculoskeletal:         General: Normal range of motion.      Cervical back: Normal range of motion and neck supple.   Feet:      " Right foot:      Skin integrity: No ulcer, skin breakdown, erythema, warmth, callus or dry skin.      Left foot:      Skin integrity: No ulcer, skin breakdown, erythema, warmth, callus or dry skin.   Skin:     General: Skin is warm and dry.   Neurological:      General: No focal deficit present.      Mental Status: He is alert and oriented to person, place, and time. Mental status is at baseline.   Psychiatric:         Mood and Affect: Mood normal.         Behavior: Behavior normal.         Thought Content: Thought content normal.         Judgment: Judgment normal.          DORIAN French    Diabetic Foot Exam    Patient's shoes and socks removed.    Right Foot/Ankle   Right Foot Inspection  Skin Exam: skin normal and skin intact. No dry skin, no warmth, no callus, no erythema, no maceration, no abnormal color, no pre-ulcer, no ulcer and no callus.     Toe Exam: ROM and strength within normal limits.     Sensory   Vibration: intact  Proprioception: intact  Monofilament testing: intact    Vascular  Capillary refills: < 3 seconds  The right DP pulse is 2+. The right PT pulse is 2+.     Left Foot/Ankle  Left Foot Inspection  Skin Exam: skin normal and skin intact. No dry skin, no warmth, no erythema, no maceration, normal color, no pre-ulcer, no ulcer and no callus.     Toe Exam: ROM and strength within normal limits.     Sensory   Vibration: intact  Proprioception: intact  Monofilament testing: intact    Vascular  Capillary refills: < 3 seconds  The left DP pulse is 2+. The left PT pulse is 2+.     Assign Risk Category  No deformity present  No loss of protective sensation  No weak pulses  Risk: 0

## 2024-04-16 NOTE — ASSESSMENT & PLAN NOTE
The patient's lipid panel from January 15, 2024 was discussed with him.  At that time I did recommend a statin and the patient declined.  I again discussed this with him today in the office.  He is agreeable to starting on a statin.  Crestor 10 mg was sent to his pharmacy.  I will repeat his lipid panel prior to his next appointment    Component      Latest Ref Rng 1/15/2024   Cholesterol      See Comment mg/dL 159    Triglycerides      See Comment mg/dL 162 (H)    HDL      >=40 mg/dL 34 (L)    LDL Calculated      0 - 100 mg/dL 93       Legend:  (H) High  (L) Low    The 10-year ASCVD risk score (Enoc WOODSON, et al., 2019) is: 22.9%    Values used to calculate the score:      Age: 73 years      Sex: Male      Is Non- : Yes      Diabetic: Yes      Tobacco smoker: No      Systolic Blood Pressure: 128 mmHg      Is BP treated: No      HDL Cholesterol: 34 mg/dL      Total Cholesterol: 159 mg/dL

## 2024-04-16 NOTE — ASSESSMENT & PLAN NOTE
Stable at present.  Minimal lower extremity swelling.  Dopplers were negative in the past.  Normal pulses.

## 2024-04-16 NOTE — ASSESSMENT & PLAN NOTE
Lab Results   Component Value Date    EGFR 68 04/01/2024    EGFR 71 01/15/2024    EGFR 76 12/23/2022    CREATININE 1.08 04/01/2024    CREATININE 1.04 01/15/2024    CREATININE 0.99 12/23/2022   Blood pressure in the office today is 128/80.  He continues to monitor his diet at home for salt.  He had previously been on amlodipine but has stopped that medication on his own.

## 2024-04-19 ENCOUNTER — LAB REQUISITION (OUTPATIENT)
Dept: LAB | Facility: HOSPITAL | Age: 74
End: 2024-04-19

## 2024-04-19 DIAGNOSIS — R97.20 ELEVATED PROSTATE SPECIFIC ANTIGEN (PSA): ICD-10-CM

## 2024-04-23 DIAGNOSIS — E78.2 MIXED HYPERLIPIDEMIA: ICD-10-CM

## 2024-04-24 ENCOUNTER — TELEPHONE (OUTPATIENT)
Age: 74
End: 2024-04-24

## 2024-04-24 RX ORDER — ROSUVASTATIN CALCIUM 10 MG/1
10 TABLET, COATED ORAL DAILY
Qty: 90 TABLET | Refills: 1 | Status: SHIPPED | OUTPATIENT
Start: 2024-04-24

## 2024-04-24 NOTE — TELEPHONE ENCOUNTER
Patient called the RX Refill Line. Message is being forwarded to the office.     Patient is requesting the same antibiotic that his urologist prescribed for him about 2 weeks ago    Please contact patient at  [phone]

## 2024-04-30 LAB — SCAN RESULT: NORMAL

## 2024-05-02 ENCOUNTER — TELEPHONE (OUTPATIENT)
Dept: UROLOGY | Facility: AMBULATORY SURGERY CENTER | Age: 74
End: 2024-05-02

## 2024-05-02 NOTE — TELEPHONE ENCOUNTER
Called Tony back and he asked for me to send records to him  @ [address] [place] [phone]

## 2024-05-02 NOTE — TELEPHONE ENCOUNTER
Please inform of high risk Prolaris.   He should be seeing Rad onc here or in [place] if he moved.  Please document.

## 2024-05-02 NOTE — TELEPHONE ENCOUNTER
Called Tony and notified him of postitive prolaris  he wants to have records sent [facility] as he has moved back to [place]. Then asked to call back and he was driving

## 2024-06-03 ENCOUNTER — TELEPHONE (OUTPATIENT)
Dept: ADMINISTRATIVE | Facility: OTHER | Age: 74
End: 2024-06-03

## 2024-06-03 NOTE — TELEPHONE ENCOUNTER
----- Message from Melida WOODS sent at 6/3/2024  8:24 AM EDT -----  Regarding: care gap request  06/03/24 8:24 AM    Hello, our patient attached above has had Diabetic Eye Exam completed/performed. Please assist in updating the patient chart by pulling the document from the Media Tab. The date of service is 12/01/2023.     Thank you,  Melida Camacho  [facility]     12/1/2023-diabetic eye exam scanned into media  send care gap

## 2024-06-03 NOTE — TELEPHONE ENCOUNTER
Upon review of the In Basket request we have found/obtained the documentation. After careful review of the document we are unable to complete this request for Diabetic Eye Exam because the documentation does not have the proper verbiage (wording) needed to close the requested care gap(s) and the documentation does not have the result(s) needed to close the requested care gap(s).    Any additional questions or concerns should be emailed to the Practice Liaisons via the appropriate education email address, please do not reply via In Basket.    Thank you  Seamus Simnos   [facility]

## 2024-07-31 ENCOUNTER — VBI (OUTPATIENT)
Dept: ADMINISTRATIVE | Facility: OTHER | Age: 74
End: 2024-07-31

## 2025-06-27 DIAGNOSIS — E11.65 TYPE 2 DIABETES MELLITUS WITH HYPERGLYCEMIA, WITHOUT LONG-TERM CURRENT USE OF INSULIN (HCC): ICD-10-CM

## 2025-06-27 DIAGNOSIS — E11.9 TYPE 2 DIABETES, DIET CONTROLLED (HCC): ICD-10-CM

## 2025-06-27 DIAGNOSIS — N18.2 STAGE 2 CHRONIC KIDNEY DISEASE: ICD-10-CM

## 2025-06-27 DIAGNOSIS — R97.20 ELEVATED PSA: ICD-10-CM

## 2025-06-27 DIAGNOSIS — I10 ESSENTIAL HYPERTENSION: Chronic | ICD-10-CM

## 2025-06-27 DIAGNOSIS — E78.00 PURE HYPERCHOLESTEROLEMIA: ICD-10-CM

## 2025-07-01 ENCOUNTER — TELEPHONE (OUTPATIENT)
Dept: FAMILY MEDICINE CLINIC | Facility: CLINIC | Age: 75
End: 2025-07-01

## 2025-07-01 NOTE — TELEPHONE ENCOUNTER
----- Message from DORIAN Valdes sent at 6/27/2025 10:26 AM EDT -----  Overdue for medicare wellness visit  Blood work ordered  Please call patient to schedule

## 2025-07-08 NOTE — TELEPHONE ENCOUNTER
CHAYO- Spoke to patient. He is currently in [place]. [facility] he still lives in [place] and will be back in November. [facility] he will call to schedule Annual Well Visit when he returns. [facility] he has seen a doctor in [place] also.

## (undated) DEVICE — MAX-CORE® DISPOSABLE CORE BIOPSY INSTRUMENT, 18G X 20CM: Brand: MAX-CORE

## (undated) DEVICE — 3M™ TRANSPORE™ WHITE SURGICAL TAPE 1534-1, 1 INCH X 10 YARD (2,5CM X 9,1M), 12 ROLLS/CARTON 10 CARTONS/CASE: Brand: 3M™ TRANSPORE™

## (undated) DEVICE — 3M™ TEGADERM™ TRANSPARENT FILM DRESSING FRAME STYLE, 1628, 6 IN X 8 IN (15 CM X 20 CM), 10/CT 8CT/CASE: Brand: 3M™ TEGADERM™

## (undated) DEVICE — SCD SEQUENTIAL COMPRESSION COMFORT SLEEVE MEDIUM KNEE LENGTH: Brand: KENDALL SCD

## (undated) DEVICE — TELFA NON-ADHERENT ABSORBENT DRESSING: Brand: TELFA

## (undated) DEVICE — SPECIMEN CONTAINER STERILE PEEL PACK

## (undated) DEVICE — SYSTEM TRANSPERINEAL ACCESS PRECISIONPOINT

## (undated) DEVICE — LUBRICANT JELLY SURGILUBE TUBE 2OZ FLIP TOP

## (undated) DEVICE — ULTRASOUND GEL STERILE FOIL PK

## (undated) DEVICE — FORMALIN PRE-FILLED 10 PCT PROSTATE BIOPSY

## (undated) DEVICE — SYRINGE 10ML LL CONTROL TOP

## (undated) DEVICE — MAYO STAND COVER: Brand: CONVERTORS

## (undated) DEVICE — NEEDLE 25G X 1 1/2

## (undated) DEVICE — PROBE ULTRASOUND URONAV TRIPLANE

## (undated) DEVICE — PREP PAD BNS: Brand: CONVERTORS

## (undated) DEVICE — GAUZE SPONGES,16 PLY: Brand: CURITY

## (undated) DEVICE — UTILITY MARKER,BLACK WITH LABELS: Brand: DEVON